# Patient Record
Sex: FEMALE | Race: BLACK OR AFRICAN AMERICAN | NOT HISPANIC OR LATINO | URBAN - METROPOLITAN AREA
[De-identification: names, ages, dates, MRNs, and addresses within clinical notes are randomized per-mention and may not be internally consistent; named-entity substitution may affect disease eponyms.]

---

## 2024-02-01 VITALS
TEMPERATURE: 99 F | HEART RATE: 83 BPM | WEIGHT: 279.99 LBS | DIASTOLIC BLOOD PRESSURE: 87 MMHG | SYSTOLIC BLOOD PRESSURE: 130 MMHG | OXYGEN SATURATION: 100 % | HEIGHT: 67 IN | RESPIRATION RATE: 15 BRPM

## 2024-02-01 LAB
ANION GAP SERPL CALC-SCNC: 13 MMOL/L — SIGNIFICANT CHANGE UP (ref 5–17)
BUN SERPL-MCNC: 12 MG/DL — SIGNIFICANT CHANGE UP (ref 7–23)
CALCIUM SERPL-MCNC: 9.5 MG/DL — SIGNIFICANT CHANGE UP (ref 8.4–10.5)
CHLORIDE SERPL-SCNC: 98 MMOL/L — SIGNIFICANT CHANGE UP (ref 96–108)
CO2 SERPL-SCNC: 22 MMOL/L — SIGNIFICANT CHANGE UP (ref 22–31)
CREAT SERPL-MCNC: 0.73 MG/DL — SIGNIFICANT CHANGE UP (ref 0.5–1.3)
EGFR: 105 ML/MIN/1.73M2 — SIGNIFICANT CHANGE UP
GLUCOSE SERPL-MCNC: 203 MG/DL — HIGH (ref 70–99)
HCG SERPL-ACNC: <0 MIU/ML — SIGNIFICANT CHANGE UP
HCT VFR BLD CALC: 32.8 % — LOW (ref 34.5–45)
HGB BLD-MCNC: 10.2 G/DL — LOW (ref 11.5–15.5)
LIDOCAIN IGE QN: 11 U/L — SIGNIFICANT CHANGE UP (ref 7–60)
MCHC RBC-ENTMCNC: 22 PG — LOW (ref 27–34)
MCHC RBC-ENTMCNC: 31.1 GM/DL — LOW (ref 32–36)
MCV RBC AUTO: 70.8 FL — LOW (ref 80–100)
PLATELET # BLD AUTO: 294 K/UL — SIGNIFICANT CHANGE UP (ref 150–400)
POTASSIUM SERPL-MCNC: 3.1 MMOL/L — LOW (ref 3.5–5.3)
POTASSIUM SERPL-SCNC: 3.1 MMOL/L — LOW (ref 3.5–5.3)
RBC # BLD: 4.63 M/UL — SIGNIFICANT CHANGE UP (ref 3.8–5.2)
RBC # FLD: 14.1 % — SIGNIFICANT CHANGE UP (ref 10.3–14.5)
SODIUM SERPL-SCNC: 133 MMOL/L — LOW (ref 135–145)
TROPONIN T, HIGH SENSITIVITY RESULT: 7 NG/L — SIGNIFICANT CHANGE UP (ref 0–51)
WBC # BLD: 5.31 K/UL — SIGNIFICANT CHANGE UP (ref 3.8–10.5)
WBC # FLD AUTO: 5.31 K/UL — SIGNIFICANT CHANGE UP (ref 3.8–10.5)

## 2024-02-01 PROCEDURE — 99285 EMERGENCY DEPT VISIT HI MDM: CPT

## 2024-02-01 PROCEDURE — 71045 X-RAY EXAM CHEST 1 VIEW: CPT | Mod: 26

## 2024-02-01 RX ORDER — ONDANSETRON 8 MG/1
4 TABLET, FILM COATED ORAL ONCE
Refills: 0 | Status: COMPLETED | OUTPATIENT
Start: 2024-02-01 | End: 2024-02-01

## 2024-02-01 RX ORDER — ACETAMINOPHEN 500 MG
1000 TABLET ORAL ONCE
Refills: 0 | Status: COMPLETED | OUTPATIENT
Start: 2024-02-01 | End: 2024-02-01

## 2024-02-01 RX ORDER — FAMOTIDINE 10 MG/ML
20 INJECTION INTRAVENOUS ONCE
Refills: 0 | Status: COMPLETED | OUTPATIENT
Start: 2024-02-01 | End: 2024-02-01

## 2024-02-01 RX ADMIN — Medication 400 MILLIGRAM(S): at 23:53

## 2024-02-01 RX ADMIN — FAMOTIDINE 20 MILLIGRAM(S): 10 INJECTION INTRAVENOUS at 23:53

## 2024-02-01 RX ADMIN — ONDANSETRON 4 MILLIGRAM(S): 8 TABLET, FILM COATED ORAL at 23:02

## 2024-02-01 NOTE — ED ADULT TRIAGE NOTE - LOCATION:
Report given to nurse on 2N. Patient's wife (Pat) updated on plan and room number. All belongings including cane, Optune, cell phone with , glasses, and clothing items. Patient was discharged to  at 1430. PIV left in per nurses request.    Left arm;

## 2024-02-01 NOTE — ED ADULT NURSE NOTE - OBJECTIVE STATEMENT
pt reports 'I had chest pain a few days ago and it went away. Tonight, I went to lay down and the pain returned but it's much worse than last time." pt reports feeling nauseous, one episode of non bloody vomit noted in ED, STAT EKG performed, medicated patient with zofran as per order, labs obtained and sent, pt placed on cardiac monitoring. Pt reports the pain lessened after vomiting. Denies radiating pain, no sweating noted

## 2024-02-01 NOTE — ED ADULT TRIAGE NOTE - CHIEF COMPLAINT QUOTE
chest pain that started today after walking. hx of htn and lupus. given one tablet of nitroglycerin and 324 aspirin by ems

## 2024-02-02 ENCOUNTER — INPATIENT (INPATIENT)
Facility: HOSPITAL | Age: 44
LOS: 2 days | Discharge: HOME | DRG: 321 | End: 2024-02-05
Attending: INTERNAL MEDICINE | Admitting: STUDENT IN AN ORGANIZED HEALTH CARE EDUCATION/TRAINING PROGRAM
Payer: COMMERCIAL

## 2024-02-02 DIAGNOSIS — Z29.9 ENCOUNTER FOR PROPHYLACTIC MEASURES, UNSPECIFIED: ICD-10-CM

## 2024-02-02 DIAGNOSIS — I21.3 ST ELEVATION (STEMI) MYOCARDIAL INFARCTION OF UNSPECIFIED SITE: ICD-10-CM

## 2024-02-02 DIAGNOSIS — D50.9 IRON DEFICIENCY ANEMIA, UNSPECIFIED: ICD-10-CM

## 2024-02-02 DIAGNOSIS — R00.2 PALPITATIONS: ICD-10-CM

## 2024-02-02 DIAGNOSIS — E78.5 HYPERLIPIDEMIA, UNSPECIFIED: ICD-10-CM

## 2024-02-02 DIAGNOSIS — M32.9 SYSTEMIC LUPUS ERYTHEMATOSUS, UNSPECIFIED: ICD-10-CM

## 2024-02-02 DIAGNOSIS — N92.0 EXCESSIVE AND FREQUENT MENSTRUATION WITH REGULAR CYCLE: ICD-10-CM

## 2024-02-02 DIAGNOSIS — I10 ESSENTIAL (PRIMARY) HYPERTENSION: ICD-10-CM

## 2024-02-02 LAB
A1C WITH ESTIMATED AVERAGE GLUCOSE RESULT: 6.3 % — HIGH (ref 4–5.6)
ALBUMIN SERPL ELPH-MCNC: 4 G/DL — SIGNIFICANT CHANGE UP (ref 3.3–5)
ALBUMIN SERPL ELPH-MCNC: 4.3 G/DL — SIGNIFICANT CHANGE UP (ref 3.3–5)
ALP SERPL-CCNC: 74 U/L — SIGNIFICANT CHANGE UP (ref 40–120)
ALP SERPL-CCNC: 79 U/L — SIGNIFICANT CHANGE UP (ref 40–120)
ALT FLD-CCNC: 28 U/L — SIGNIFICANT CHANGE UP (ref 10–45)
ALT FLD-CCNC: 49 U/L — HIGH (ref 10–45)
ANION GAP SERPL CALC-SCNC: 10 MMOL/L — SIGNIFICANT CHANGE UP (ref 5–17)
ANION GAP SERPL CALC-SCNC: 10 MMOL/L — SIGNIFICANT CHANGE UP (ref 5–17)
ANISOCYTOSIS BLD QL: SLIGHT — SIGNIFICANT CHANGE UP
APTT BLD: 97.6 SEC — HIGH (ref 24.5–35.6)
AST SERPL-CCNC: 107 U/L — HIGH (ref 10–40)
AST SERPL-CCNC: 297 U/L — HIGH (ref 10–40)
BASOPHILS # BLD AUTO: 0 K/UL — SIGNIFICANT CHANGE UP (ref 0–0.2)
BASOPHILS # BLD AUTO: 0.01 K/UL — SIGNIFICANT CHANGE UP (ref 0–0.2)
BASOPHILS NFR BLD AUTO: 0 % — SIGNIFICANT CHANGE UP (ref 0–2)
BASOPHILS NFR BLD AUTO: 0.2 % — SIGNIFICANT CHANGE UP (ref 0–2)
BILIRUB SERPL-MCNC: 0.3 MG/DL — SIGNIFICANT CHANGE UP (ref 0.2–1.2)
BILIRUB SERPL-MCNC: 0.3 MG/DL — SIGNIFICANT CHANGE UP (ref 0.2–1.2)
BLD GP AB SCN SERPL QL: NEGATIVE — SIGNIFICANT CHANGE UP
BUN SERPL-MCNC: 11 MG/DL — SIGNIFICANT CHANGE UP (ref 7–23)
BUN SERPL-MCNC: 8 MG/DL — SIGNIFICANT CHANGE UP (ref 7–23)
CALCIUM SERPL-MCNC: 9.5 MG/DL — SIGNIFICANT CHANGE UP (ref 8.4–10.5)
CALCIUM SERPL-MCNC: 9.6 MG/DL — SIGNIFICANT CHANGE UP (ref 8.4–10.5)
CHLORIDE SERPL-SCNC: 100 MMOL/L — SIGNIFICANT CHANGE UP (ref 96–108)
CHLORIDE SERPL-SCNC: 99 MMOL/L — SIGNIFICANT CHANGE UP (ref 96–108)
CHOLEST SERPL-MCNC: 198 MG/DL — SIGNIFICANT CHANGE UP
CK MB CFR SERPL CALC: 176.4 NG/ML — HIGH (ref 0–6.7)
CK SERPL-CCNC: 1483 U/L — HIGH (ref 25–170)
CO2 SERPL-SCNC: 22 MMOL/L — SIGNIFICANT CHANGE UP (ref 22–31)
CO2 SERPL-SCNC: 26 MMOL/L — SIGNIFICANT CHANGE UP (ref 22–31)
CREAT SERPL-MCNC: 0.69 MG/DL — SIGNIFICANT CHANGE UP (ref 0.5–1.3)
CREAT SERPL-MCNC: 0.91 MG/DL — SIGNIFICANT CHANGE UP (ref 0.5–1.3)
DACRYOCYTES BLD QL SMEAR: SLIGHT — SIGNIFICANT CHANGE UP
EGFR: 111 ML/MIN/1.73M2 — SIGNIFICANT CHANGE UP
EGFR: 81 ML/MIN/1.73M2 — SIGNIFICANT CHANGE UP
EOSINOPHIL # BLD AUTO: 0 K/UL — SIGNIFICANT CHANGE UP (ref 0–0.5)
EOSINOPHIL # BLD AUTO: 0.05 K/UL — SIGNIFICANT CHANGE UP (ref 0–0.5)
EOSINOPHIL NFR BLD AUTO: 0 % — SIGNIFICANT CHANGE UP (ref 0–6)
EOSINOPHIL NFR BLD AUTO: 0.9 % — SIGNIFICANT CHANGE UP (ref 0–6)
ESTIMATED AVERAGE GLUCOSE: 134 MG/DL — HIGH (ref 68–114)
FLUAV AG NPH QL: SIGNIFICANT CHANGE UP
FLUBV AG NPH QL: SIGNIFICANT CHANGE UP
GLUCOSE SERPL-MCNC: 124 MG/DL — HIGH (ref 70–99)
GLUCOSE SERPL-MCNC: 143 MG/DL — HIGH (ref 70–99)
HCT VFR BLD CALC: 32.3 % — LOW (ref 34.5–45)
HDLC SERPL-MCNC: 56 MG/DL — SIGNIFICANT CHANGE UP
HGB BLD-MCNC: 10 G/DL — LOW (ref 11.5–15.5)
HYPOCHROMIA BLD QL: SLIGHT — SIGNIFICANT CHANGE UP
IMM GRANULOCYTES NFR BLD AUTO: 0.2 % — SIGNIFICANT CHANGE UP (ref 0–0.9)
ISTAT ACTK (ACTIVATED CLOTTING TIME KAOLIN): 174 SEC — HIGH (ref 74–137)
ISTAT ACTK (ACTIVATED CLOTTING TIME KAOLIN): 369 SEC — HIGH (ref 74–137)
LACTATE SERPL-SCNC: 1.1 MMOL/L — SIGNIFICANT CHANGE UP (ref 0.5–2)
LIPID PNL WITH DIRECT LDL SERPL: 128 MG/DL — HIGH
LYMPHOCYTES # BLD AUTO: 1.25 K/UL — SIGNIFICANT CHANGE UP (ref 1–3.3)
LYMPHOCYTES # BLD AUTO: 2.26 K/UL — SIGNIFICANT CHANGE UP (ref 1–3.3)
LYMPHOCYTES # BLD AUTO: 29.2 % — SIGNIFICANT CHANGE UP (ref 13–44)
LYMPHOCYTES # BLD AUTO: 42.5 % — SIGNIFICANT CHANGE UP (ref 13–44)
MACROCYTES BLD QL: SLIGHT — SIGNIFICANT CHANGE UP
MAGNESIUM SERPL-MCNC: 1.6 MG/DL — SIGNIFICANT CHANGE UP (ref 1.6–2.6)
MANUAL SMEAR VERIFICATION: SIGNIFICANT CHANGE UP
MCHC RBC-ENTMCNC: 21.6 PG — LOW (ref 27–34)
MCHC RBC-ENTMCNC: 31 GM/DL — LOW (ref 32–36)
MCV RBC AUTO: 69.6 FL — LOW (ref 80–100)
MICROCYTES BLD QL: SLIGHT — SIGNIFICANT CHANGE UP
MONOCYTES # BLD AUTO: 0.56 K/UL — SIGNIFICANT CHANGE UP (ref 0–0.9)
MONOCYTES # BLD AUTO: 0.6 K/UL — SIGNIFICANT CHANGE UP (ref 0–0.9)
MONOCYTES NFR BLD AUTO: 10.6 % — SIGNIFICANT CHANGE UP (ref 2–14)
MONOCYTES NFR BLD AUTO: 14 % — SIGNIFICANT CHANGE UP (ref 2–14)
NEUTROPHILS # BLD AUTO: 2.41 K/UL — SIGNIFICANT CHANGE UP (ref 1.8–7.4)
NEUTROPHILS # BLD AUTO: 2.44 K/UL — SIGNIFICANT CHANGE UP (ref 1.8–7.4)
NEUTROPHILS NFR BLD AUTO: 45.1 % — SIGNIFICANT CHANGE UP (ref 43–77)
NEUTROPHILS NFR BLD AUTO: 56.4 % — SIGNIFICANT CHANGE UP (ref 43–77)
NEUTS BAND # BLD: 0.9 % — SIGNIFICANT CHANGE UP (ref 0–8)
NON HDL CHOLESTEROL: 142 MG/DL — HIGH
NRBC # BLD: 0 /100 WBCS — SIGNIFICANT CHANGE UP (ref 0–0)
OVALOCYTES BLD QL SMEAR: SIGNIFICANT CHANGE UP
PHOSPHATE SERPL-MCNC: 3.4 MG/DL — SIGNIFICANT CHANGE UP (ref 2.5–4.5)
PLAT MORPH BLD: ABNORMAL
PLATELET # BLD AUTO: 285 K/UL — SIGNIFICANT CHANGE UP (ref 150–400)
POIKILOCYTOSIS BLD QL AUTO: SIGNIFICANT CHANGE UP
POLYCHROMASIA BLD QL SMEAR: SLIGHT — SIGNIFICANT CHANGE UP
POTASSIUM SERPL-MCNC: 3.9 MMOL/L — SIGNIFICANT CHANGE UP (ref 3.5–5.3)
POTASSIUM SERPL-MCNC: 3.9 MMOL/L — SIGNIFICANT CHANGE UP (ref 3.5–5.3)
POTASSIUM SERPL-SCNC: 3.9 MMOL/L — SIGNIFICANT CHANGE UP (ref 3.5–5.3)
POTASSIUM SERPL-SCNC: 3.9 MMOL/L — SIGNIFICANT CHANGE UP (ref 3.5–5.3)
PROT SERPL-MCNC: 8.2 G/DL — SIGNIFICANT CHANGE UP (ref 6–8.3)
PROT SERPL-MCNC: 8.3 G/DL — SIGNIFICANT CHANGE UP (ref 6–8.3)
RBC # BLD: 4.64 M/UL — SIGNIFICANT CHANGE UP (ref 3.8–5.2)
RBC # FLD: 14.4 % — SIGNIFICANT CHANGE UP (ref 10.3–14.5)
RBC BLD AUTO: ABNORMAL
RH IG SCN BLD-IMP: POSITIVE — SIGNIFICANT CHANGE UP
RSV RNA NPH QL NAA+NON-PROBE: SIGNIFICANT CHANGE UP
SARS-COV-2 RNA SPEC QL NAA+PROBE: SIGNIFICANT CHANGE UP
SCHISTOCYTES BLD QL AUTO: SLIGHT — SIGNIFICANT CHANGE UP
SMUDGE CELLS # BLD: PRESENT — SIGNIFICANT CHANGE UP
SODIUM SERPL-SCNC: 132 MMOL/L — LOW (ref 135–145)
SODIUM SERPL-SCNC: 135 MMOL/L — SIGNIFICANT CHANGE UP (ref 135–145)
TRIGL SERPL-MCNC: 68 MG/DL — SIGNIFICANT CHANGE UP
TROPONIN T, HIGH SENSITIVITY RESULT: 2247 NG/L — CRITICAL HIGH (ref 0–51)
TROPONIN T, HIGH SENSITIVITY RESULT: 79 NG/L — CRITICAL HIGH (ref 0–51)
WBC # BLD: 4.28 K/UL — SIGNIFICANT CHANGE UP (ref 3.8–10.5)
WBC # FLD AUTO: 4.28 K/UL — SIGNIFICANT CHANGE UP (ref 3.8–10.5)

## 2024-02-02 PROCEDURE — 93308 TTE F-UP OR LMTD: CPT | Mod: 26,59

## 2024-02-02 PROCEDURE — 93458 L HRT ARTERY/VENTRICLE ANGIO: CPT | Mod: 26,59

## 2024-02-02 PROCEDURE — 74177 CT ABD & PELVIS W/CONTRAST: CPT | Mod: 26,MG

## 2024-02-02 PROCEDURE — 71275 CT ANGIOGRAPHY CHEST: CPT | Mod: 26,MG

## 2024-02-02 PROCEDURE — G1004: CPT

## 2024-02-02 PROCEDURE — 93010 ELECTROCARDIOGRAM REPORT: CPT

## 2024-02-02 PROCEDURE — 92978 ENDOLUMINL IVUS OCT C 1ST: CPT | Mod: 26,LD

## 2024-02-02 PROCEDURE — 92928 PRQ TCAT PLMT NTRAC ST 1 LES: CPT | Mod: LD

## 2024-02-02 PROCEDURE — 93306 TTE W/DOPPLER COMPLETE: CPT | Mod: 26

## 2024-02-02 PROCEDURE — 99291 CRITICAL CARE FIRST HOUR: CPT | Mod: GC

## 2024-02-02 PROCEDURE — 71045 X-RAY EXAM CHEST 1 VIEW: CPT | Mod: 26

## 2024-02-02 PROCEDURE — 99152 MOD SED SAME PHYS/QHP 5/>YRS: CPT

## 2024-02-02 RX ORDER — METOPROLOL TARTRATE 50 MG
25 TABLET ORAL EVERY 12 HOURS
Refills: 0 | Status: DISCONTINUED | OUTPATIENT
Start: 2024-02-02 | End: 2024-02-02

## 2024-02-02 RX ORDER — CHLORHEXIDINE GLUCONATE 213 G/1000ML
1 SOLUTION TOPICAL
Refills: 0 | Status: DISCONTINUED | OUTPATIENT
Start: 2024-02-02 | End: 2024-02-05

## 2024-02-02 RX ORDER — ATORVASTATIN CALCIUM 80 MG/1
80 TABLET, FILM COATED ORAL AT BEDTIME
Refills: 0 | Status: DISCONTINUED | OUTPATIENT
Start: 2024-02-02 | End: 2024-02-05

## 2024-02-02 RX ORDER — HYDROXYCHLOROQUINE SULFATE 200 MG
1 TABLET ORAL
Refills: 0 | DISCHARGE

## 2024-02-02 RX ORDER — ASPIRIN/CALCIUM CARB/MAGNESIUM 324 MG
81 TABLET ORAL EVERY 24 HOURS
Refills: 0 | Status: DISCONTINUED | OUTPATIENT
Start: 2024-02-02 | End: 2024-02-05

## 2024-02-02 RX ORDER — SACUBITRIL AND VALSARTAN 24; 26 MG/1; MG/1
1 TABLET, FILM COATED ORAL
Refills: 0 | Status: DISCONTINUED | OUTPATIENT
Start: 2024-02-02 | End: 2024-02-05

## 2024-02-02 RX ORDER — IOHEXOL 300 MG/ML
30 INJECTION, SOLUTION INTRAVENOUS ONCE
Refills: 0 | Status: COMPLETED | OUTPATIENT
Start: 2024-02-02 | End: 2024-02-02

## 2024-02-02 RX ORDER — POTASSIUM CHLORIDE 20 MEQ
40 PACKET (EA) ORAL ONCE
Refills: 0 | Status: COMPLETED | OUTPATIENT
Start: 2024-02-02 | End: 2024-02-02

## 2024-02-02 RX ORDER — HYDROXYCHLOROQUINE SULFATE 200 MG
200 TABLET ORAL EVERY 12 HOURS
Refills: 0 | Status: DISCONTINUED | OUTPATIENT
Start: 2024-02-02 | End: 2024-02-05

## 2024-02-02 RX ORDER — HEPARIN SODIUM 5000 [USP'U]/ML
5000 INJECTION INTRAVENOUS; SUBCUTANEOUS ONCE
Refills: 0 | Status: COMPLETED | OUTPATIENT
Start: 2024-02-02 | End: 2024-02-02

## 2024-02-02 RX ORDER — MORPHINE SULFATE 50 MG/1
4 CAPSULE, EXTENDED RELEASE ORAL ONCE
Refills: 0 | Status: DISCONTINUED | OUTPATIENT
Start: 2024-02-02 | End: 2024-02-02

## 2024-02-02 RX ORDER — TICAGRELOR 90 MG/1
180 TABLET ORAL ONCE
Refills: 0 | Status: COMPLETED | OUTPATIENT
Start: 2024-02-02 | End: 2024-02-02

## 2024-02-02 RX ORDER — MORPHINE SULFATE 50 MG/1
1 CAPSULE, EXTENDED RELEASE ORAL ONCE
Refills: 0 | Status: DISCONTINUED | OUTPATIENT
Start: 2024-02-02 | End: 2024-02-02

## 2024-02-02 RX ORDER — TICAGRELOR 90 MG/1
90 TABLET ORAL EVERY 12 HOURS
Refills: 0 | Status: DISCONTINUED | OUTPATIENT
Start: 2024-02-02 | End: 2024-02-05

## 2024-02-02 RX ORDER — POTASSIUM CHLORIDE 20 MEQ
20 PACKET (EA) ORAL ONCE
Refills: 0 | Status: COMPLETED | OUTPATIENT
Start: 2024-02-02 | End: 2024-02-02

## 2024-02-02 RX ORDER — HEPARIN SODIUM 5000 [USP'U]/ML
6000 INJECTION INTRAVENOUS; SUBCUTANEOUS EVERY 6 HOURS
Refills: 0 | Status: DISCONTINUED | OUTPATIENT
Start: 2024-02-02 | End: 2024-02-02

## 2024-02-02 RX ORDER — ASPIRIN/CALCIUM CARB/MAGNESIUM 324 MG
325 TABLET ORAL ONCE
Refills: 0 | Status: COMPLETED | OUTPATIENT
Start: 2024-02-02 | End: 2024-02-02

## 2024-02-02 RX ORDER — METOPROLOL TARTRATE 50 MG
12.5 TABLET ORAL EVERY 12 HOURS
Refills: 0 | Status: DISCONTINUED | OUTPATIENT
Start: 2024-02-02 | End: 2024-02-04

## 2024-02-02 RX ORDER — MAGNESIUM SULFATE 500 MG/ML
2 VIAL (ML) INJECTION ONCE
Refills: 0 | Status: COMPLETED | OUTPATIENT
Start: 2024-02-02 | End: 2024-02-02

## 2024-02-02 RX ORDER — HEPARIN SODIUM 5000 [USP'U]/ML
INJECTION INTRAVENOUS; SUBCUTANEOUS
Qty: 25000 | Refills: 0 | Status: DISCONTINUED | OUTPATIENT
Start: 2024-02-02 | End: 2024-02-02

## 2024-02-02 RX ORDER — TICAGRELOR 90 MG/1
1 TABLET ORAL
Qty: 60 | Refills: 0
Start: 2024-02-02 | End: 2024-03-02

## 2024-02-02 RX ADMIN — Medication 200 MILLIGRAM(S): at 21:46

## 2024-02-02 RX ADMIN — TICAGRELOR 180 MILLIGRAM(S): 90 TABLET ORAL at 03:37

## 2024-02-02 RX ADMIN — MORPHINE SULFATE 1 MILLIGRAM(S): 50 CAPSULE, EXTENDED RELEASE ORAL at 07:30

## 2024-02-02 RX ADMIN — HEPARIN SODIUM 5000 UNIT(S): 5000 INJECTION INTRAVENOUS; SUBCUTANEOUS at 03:36

## 2024-02-02 RX ADMIN — ATORVASTATIN CALCIUM 80 MILLIGRAM(S): 80 TABLET, FILM COATED ORAL at 21:46

## 2024-02-02 RX ADMIN — Medication 12.5 MILLIGRAM(S): at 18:34

## 2024-02-02 RX ADMIN — HEPARIN SODIUM 1000 UNIT(S)/HR: 5000 INJECTION INTRAVENOUS; SUBCUTANEOUS at 03:34

## 2024-02-02 RX ADMIN — Medication 81 MILLIGRAM(S): at 21:45

## 2024-02-02 RX ADMIN — Medication 40 MILLIEQUIVALENT(S): at 00:14

## 2024-02-02 RX ADMIN — SACUBITRIL AND VALSARTAN 1 TABLET(S): 24; 26 TABLET, FILM COATED ORAL at 19:20

## 2024-02-02 RX ADMIN — IOHEXOL 30 MILLILITER(S): 300 INJECTION, SOLUTION INTRAVENOUS at 00:31

## 2024-02-02 RX ADMIN — MORPHINE SULFATE 4 MILLIGRAM(S): 50 CAPSULE, EXTENDED RELEASE ORAL at 02:13

## 2024-02-02 RX ADMIN — Medication 25 GRAM(S): at 09:33

## 2024-02-02 RX ADMIN — MORPHINE SULFATE 4 MILLIGRAM(S): 50 CAPSULE, EXTENDED RELEASE ORAL at 02:40

## 2024-02-02 RX ADMIN — HEPARIN SODIUM UNIT(S)/HR: 5000 INJECTION INTRAVENOUS; SUBCUTANEOUS at 04:20

## 2024-02-02 RX ADMIN — Medication 20 MILLIEQUIVALENT(S): at 09:34

## 2024-02-02 RX ADMIN — Medication 200 MILLIGRAM(S): at 10:17

## 2024-02-02 RX ADMIN — TICAGRELOR 90 MILLIGRAM(S): 90 TABLET ORAL at 15:29

## 2024-02-02 RX ADMIN — MORPHINE SULFATE 4 MILLIGRAM(S): 50 CAPSULE, EXTENDED RELEASE ORAL at 00:25

## 2024-02-02 RX ADMIN — MORPHINE SULFATE 4 MILLIGRAM(S): 50 CAPSULE, EXTENDED RELEASE ORAL at 00:50

## 2024-02-02 RX ADMIN — MORPHINE SULFATE 1 MILLIGRAM(S): 50 CAPSULE, EXTENDED RELEASE ORAL at 06:37

## 2024-02-02 NOTE — ED ADULT NURSE REASSESSMENT NOTE - NS ED NURSE REASSESS COMMENT FT1
pt noted with elevated trop level of 79 from 7, also with  EKG changes, cardiac fellow at bedside perform bedside ultrasound, pt is alert and oriented x4, breathing at ease on room air, complaining of chest pain 5/10, heparin and brilinta administered as per order, ongoing close monitoring.

## 2024-02-02 NOTE — PATIENT PROFILE ADULT - FALL HARM RISK - HARM RISK INTERVENTIONS
Assistance with ambulation/Assistance OOB with selected safe patient handling equipment/Communicate Risk of Fall with Harm to all staff/Discuss with provider need for PT consult/Monitor gait and stability/Provide patient with walking aids - walker, cane, crutches/Reinforce activity limits and safety measures with patient and family/Review medications for side effects contributing to fall risk/Sit up slowly, dangle for a short time, stand at bedside before walking/Tailored Fall Risk Interventions/Toileting schedule using arm’s reach rule for commode and bathroom/Use of alarms - bed, chair and/or voice tab/Visual Cue: Yellow wristband and red socks/Bed in lowest position, wheels locked, appropriate side rails in place/Call bell, personal items and telephone in reach/Instruct patient to call for assistance before getting out of bed or chair/Non-slip footwear when patient is out of bed/[place] to call system/Physically safe environment - no spills, clutter or unnecessary equipment/Purposeful Proactive Rounding/Room/bathroom lighting operational, light cord in reach

## 2024-02-02 NOTE — DIETITIAN INITIAL EVALUATION ADULT - OTHER INFO
44 y/o F w/PMH SLE (on plaquinel), HTN, anemia, heavy menstrual cycles (saturated 12-14 tampons per 24 hours), uterine fibroid, L ovary cyst, spinal stenosis (daily NSAID use) who presented with c/o mid epigastric pain and chest pressure, associated with nausea  and R arm tingling. In ED patient initial trop 79 and EKG with ST Changes or TWI. Patient had CT chest ruled out PE, and CT Abdomen Pelvis w/ Multi fibroid uterus including a 2.6 cm hypodense partially calcified intramural fibroid. Patient with recurrent CP in the ER STEMI code called as trop elevated from 7 to 79 and with dynamic EKG changes pt brought urgently to cath lab and now s/p cath w/ Dr. Priest 2/2/2024 with LI to culprit lesion pLAD (100%). Patient had ECHO with reduced LVEF 30-35%, Grade I left ventricular diastolic dysfunction.  Patient stepped down to cardiac tele     Chart reviewed. Pt seen with daughter at bedside on 5 LA, on room air. Currently ordered for PO DASH/TLC diet and tolerating. Pt endorses recent changes to diet including increasing lean protein consumption, increasing fruit and vegetable & whole grain consumption. Pt endorses intentional weight loss of ~25lbs x ~1 year [not significant]. No overt muscle wasting/subcutaneous losses noted. Pt endorses good appetite & PO intake at baseline. No chew/swallow difficulty noted. Labs reviewed- Na 132 <L> [monitor fluid/hydration status], Hgb A1c 6.3% [2/2],  <H>, other lipids WNL. Meds reviewed. Diet education provided to pt on heart healthy diet & weight management, handout provided. All questions answered. Pt may benefit from outpatient RDN counseling for further management, information provided. RDN will continue to monitor, reassess, and intervene as appropriate.      Pain: no pain/discomfort noted  Skin: no pressure injuries noted, Stevan score 20  GI: denies n/v/c/d/abd pain

## 2024-02-02 NOTE — ED CLERICAL - CLERICAL COMMENTS
stemi code called @ 03:06 by JOSEPH Aguilar stemi code called @ 03:06 by JOSEPH Aguilar | cath lab code called @ 04:01 by JOSEPH Aguilar

## 2024-02-02 NOTE — DIETITIAN INITIAL EVALUATION ADULT - PERTINENT MEDS FT
Biopsy Method: Dermablade MEDICATIONS  (STANDING):  aspirin enteric coated 81 milliGRAM(s) Oral every 24 hours  atorvastatin 80 milliGRAM(s) Oral at bedtime  chlorhexidine 2% Cloths 1 Application(s) Topical <User Schedule>  hydroxychloroquine 200 milliGRAM(s) Oral every 12 hours  metoprolol tartrate 12.5 milliGRAM(s) Oral every 12 hours  ticagrelor 90 milliGRAM(s) Oral every 12 hours    MEDICATIONS  (PRN):

## 2024-02-02 NOTE — ED PROVIDER NOTE - PROGRESS NOTE DETAILS
Case discussed with cardiology on call  , pt with ecg changes and second trop 79. asa, brillint and heparin recommended , + echo. awaiting for CTA results.

## 2024-02-02 NOTE — H&P ADULT - HISTORY OF PRESENT ILLNESS
Ms Emma Whipple is a 44 y/o F w/ PMH  SLE, HTN, anemia who presented with c/o mid epigastric pain/mid chest pain, generalized fatigue, some SOB. Pt states her symptoms x4-5 days. Patient tried miralax because she felt bloated which she thought helped initially, but indigestion-like pain persisted. She presented tonight because  Pt reports tonight pain started again, at this time radiating to her mid back, with nausea. Pt vomit on her way to ER. Denies fever, chills, cough, diarrhea, bloody stool.         ED Course  Vitals    T - 98.9    HR - 77-90    BP - 127/69 - 146/81    SPO2 - 99 RA  EKG:   Labs:Hg 10.2; MCV 70.8; trop T 79, K 3.1  Interventions: Cath lab  Meds: famotidine 20mg; morphine 4mg IVx2; zofran 4mg IV, KCl 40meq PO; brilinta 180mg, tylenol 1g IV  Imaging:    CT PE:     CT A/P: Ms Emma Whipple is a 44 y/o F w/ PMH  SLE (on plaquinil), HTN, anemia who presented with c/o mid epigastric pain/mid chest pain, generalized fatigue, some SOB. Pt states her symptoms x4-5 days. Patient tried miralax because she felt bloated which she thought helped initially, but indigestion-like pain persisted. She presented tonight because her pain began radiating to her mid-back and she had new symptom of nausea. Pt had one episode of emesis on her way to the ED. Denies fever, chills, cough, diarrhea, bloody stool.         ED Course  Vitals    T - 98.9    HR - 77-90    BP - 127/69 - 146/81    SPO2 - 99 RA  EKG:   Labs:Hg 10.2; MCV 70.8; trop T 79, K 3.1  Meds: famotidine 20mg; morphine 4mg IVx2; zofran 4mg IV, KCl 40meq PO; brilinta 180mg, tylenol 1g IV  Imaging:    CT PE & CT A/P: 1. No pulmonary embolism 2. Diffuse bladder wall thickening which can be a normal finding when the   bladder is decompressed but is also seen in cystitis. Clinical   correlation is recommended.  Interventions: Cath lab   Ms Emma Whipple is a 44 y/o F w/ PMH  SLE (on plaquinil), HTN, anemia who presented with c/o mid epigastric pain/mid chest pain, generalized fatigue, some SOB. Pt states her symptoms x3-4days. Patient initially attributed her symptoms to gas, took miralax, and herbal teas, which she thought helped initially; during this period she was able to ride her exercise  for  but indigestion-like pain persisted. She presented tonight because her pain began radiating to her mid-back and she had new symptom of nausea. Pt had one episode of emesis on her way to the ED. Denies fever, chills, cough, diarrhea, bloody stool.         ED Course  Vitals    T - 98.9    HR - 77-90    BP - 127/69 - 146/81    SPO2 - 99 RA  EKG:   Labs:Hg 10.2; MCV 70.8; trop T 79, K 3.1  Meds: famotidine 20mg; morphine 4mg IVx2; zofran 4mg IV, KCl 40meq PO; brilinta 180mg, tylenol 1g IV  Imaging:    CT PE & CT A/P: 1. No pulmonary embolism 2. Diffuse bladder wall thickening which can be a normal finding when the   bladder is decompressed but is also seen in cystitis. Clinical   correlation is recommended.  Interventions: Cath lab   Ms Emma Whipple is a 42 y/o F w/ PMH  SLE (on plaquinel), HTN, anemia who presented with c/o mid epigastric pain/mid chest pain, generalized fatigue, some SOB. Pt states her symptoms x3-4days. Patient initially attributed her symptoms to gas, took miralax, and herbal teas, which she thought helped initially; during this period she was able to ride her exercise for but indigestion-like pain persisted. She presented tonight because her chest pressure came back and started radiating to her mid-back, and became nauseous  as she was walking up the stairs. Pt had one episode of emesis on her way to the ED. Denies fever, chills, cough, diarrhea, bloody stool.       ED Course  Vitals    T - 98.9    HR - 77-90    BP - 127/69 - 146/81    SPO2 - 99 RA  EKG:   Labs:Hg 10.2; MCV 70.8; trop T 79, K 3.1  Meds: famotidine 20mg; morphine 4mg IVx2; zofran 4mg IV, KCl 40meq PO; brilinta 180mg, tylenol 1g IV  Imaging:    CT PE & CT A/P: 1. No pulmonary embolism 2. Diffuse bladder wall thickening which can be a normal finding when the   bladder is decompressed but is also seen in cystitis. Clinical   correlation is recommended.  Interventions: Cath lab   Ms Emma Whipple is a 44 y/o F w/ PMH  SLE (on plaquinel), HTN, anemia who presented with c/o mid epigastric pain and chest pressure, associated with nausea  and R arm tingling. Pt states her symptoms x3-4days. Patient initially attributed her symptoms to gas, took miralax, and herbal teas, which she thought helped initially; during this period she was able to ride her exercise bike and do yoga, but yesterday her chest tightness came back while she was walking up the stairs to the park with her daughter; this pain persisted while she was trying to go to bed and was worse than previous days, which prompted her to present. tonight. Pt had one episode of emesis on her way to the ED. Denies fever, chills, cough, diarrhea, bloody stool. Patient started her menstrual cycle 02/01.     Of note, pt endorses several-year hx of palpitations, which come on randomly, and are not associated with SOB, lightheadedness, chest pain. She was referred to a cardiologist several months ago, who put on a holter monitor for 72 hours; pt notes that the cardiologist said there were some "hiccups" on the report, but denies atrial fibrillation. No interventions were made.     ED Course  Vitals    T - 98.9    HR - 77-90    BP - 127/69 - 146/81    SPO2 - 99 RA  EKG:   Labs:Hg 10.2; MCV 70.8; trop T 79, K 3.1  Meds: famotidine 20mg; morphine 4mg IVx2; zofran 4mg IV, KCl 40meq PO; brilinta 180mg, tylenol 1g IV  Imaging:    CT PE & CT A/P: 1. No pulmonary embolism 2. Diffuse bladder wall thickening which can be a normal finding when the   bladder is decompressed but is also seen in cystitis. Clinical   correlation is recommended.  Interventions: Cath lab   Ms Emma Whipple is a 42 y/o F w/PMH  SLE (on plaquinel), HTN, anemia, heavy menstrual cycles (saturated 12-14 tampons per 24 hours), uterine fibroid, L ovary cyst, spinal stenosis (daily NSAID use) who presented with c/o mid epigastric pain and chest pressure, associated with nausea  and R arm tingling. Pt states her symptoms x3-4days. Patient initially attributed her symptoms to gas, took miralax, and herbal teas, which she thought helped initially; during this period she was able to ride her exercise bike and do yoga, but yesterday her chest tightness came back while she was walking up the stairs to the park with her daughter; this pain persisted while she was trying to go to bed and was worse than previous days, which prompted her to present. tonight. Pt had one episode of emesis on her way to the ED. Denies fever, chills, cough, diarrhea, bloody stool. Patient started her menstrual cycle 02/01.     Of note, pt endorses several-year hx of palpitations, which come on randomly, and are not associated with SOB, lightheadedness, chest pain. She was referred to a cardiologist several months ago, who put on a holter monitor for 72 hours; pt notes that the cardiologist said there were some "hiccups" on the report, but denies atrial fibrillation. No interventions were made.     ED Course  Vitals    T - 98.9    HR - 77-90    BP - 127/69 - 146/81    SPO2 - 99 RA  EKG:   Labs:Hg 10.2; MCV 70.8; trop T 79, K 3.1  Meds: famotidine 20mg; morphine 4mg IVx2; zofran 4mg IV, KCl 40meq PO; brilinta 180mg, tylenol 1g IV  Imaging:    CT PE & CT A/P: 1. No pulmonary embolism 2. Diffuse bladder wall thickening which can be a normal finding when the   bladder is decompressed but is also seen in cystitis. Clinical   correlation is recommended.  Interventions: Cath lab   Ms Emma Whipple is a 44 y/o F w/PMH  SLE (on plaquinel), HTN, anemia, heavy menstrual cycles (saturated 12-14 tampons per 24 hours), uterine fibroid, L ovary cyst, spinal stenosis (daily NSAID use) who presented with c/o mid epigastric pain and chest pressure, associated with nausea  and R arm tingling. Pt states her symptoms x3-4days. Patient initially attributed her symptoms to gas, took miralax, and herbal teas, which she thought helped initially; during this period she was able to ride her exercise bike and do yoga, but yesterday her chest tightness came back while she was walking up the stairs to the park with her daughter; this pain persisted while she was trying to go to bed and was worse than previous days, which prompted her to present. tonight. Pt had one episode of emesis on her way to the ED. Denies fever, chills, cough, diarrhea, bloody stool. Patient started her menstrual cycle 02/01.     Of note, pt endorses several-year hx of palpitations, which come on randomly, and are not associated with SOB, lightheadedness, chest pain. She was referred to a cardiologist several months ago, who put on a holter monitor for 72 hours; pt notes that the cardiologist said there were some "hiccups" on the report, but denies atrial fibrillation. No interventions were made.     ED Course  Vitals    T - 98.9    HR - 77-90    BP - 127/69 - 146/81    SPO2 - 99 RA  EKG: ST-elevation in AVR  Labs:Hg 10.2; MCV 70.8; trop T 79, K 3.1  Meds: famotidine 20mg; morphine 4mg IVx2; zofran 4mg IV, KCl 40meq PO; brilinta 180mg, tylenol 1g IV  Imaging:    CT PE & CT A/P: 1. No pulmonary embolism 2. Diffuse bladder wall thickening which can be a normal finding when the   bladder is decompressed but is also seen in cystitis. Clinical   correlation is recommended.  Interventions: Cath lab

## 2024-02-02 NOTE — DIETITIAN INITIAL EVALUATION ADULT - OTHER CALCULATIONS
Ideal body weight (61.2kg) used for calculations as pt >120% of IBW. Needs estimated for age and adjusted for current clinical status.

## 2024-02-02 NOTE — PROGRESS NOTE ADULT - PROBLEM SELECTOR PLAN 4
Patient w/ SLE, on plaquenil 200mg BID   -continue plaquenil 200mg BID Total cholesterol 198; HDL 56, . Goal LDL given CAD is LDL of 70.  - c/w atorvastatin 80 mg QHS

## 2024-02-02 NOTE — DIETITIAN INITIAL EVALUATION ADULT - PERTINENT LABORATORY DATA
02-02    132<L>  |  100  |  8   ----------------------------<  143<H>  3.9   |  22  |  0.69    Ca    9.6      02 Feb 2024 06:31  Phos  3.4     02-02  Mg     1.6     02-02    TPro  8.2  /  Alb  4.0  /  TBili  0.3  /  DBili  x   /  AST  107<H>  /  ALT  28  /  AlkPhos  74  02-02  A1C with Estimated Average Glucose Result: 6.3 % (02-02-24 @ 06:31)

## 2024-02-02 NOTE — H&P ADULT - ASSESSMENT
Ms Emma Whipple is a 42 y/o F w/ PMH  SLE (on plaquinel), HTN, anemia who presented with c/o mid epigastric pain/mid chest pain, generalized fatigue, and SOB for 3 days, was found to have STEMI and 100% occlusion of the proximal LAD, s/p cath lab and stent to prox LAD. Admitted to CCU for monitoring.    NEURO  no active issues    CARDS  #STEMI  s/p PCI w/ stent to proximal LAD via L radial access. s/p aspirin and brillinta load.  -brilinta maintenance dose  -aspirin maintenance dose  -atorvastatin 80mg q24  -monitor and deflate trans-radial band as appropriate  -f/u limited TTE  -f/u complete TTE  -f/u daily EKGs  -arrange for close cardiology f/u  -f/u lipid panel  -f/u A1C    #HTN  On home amlodipine ____. Normotensive on admission and post-cath.  -hold amlodipine for now and monitor pressures  -restart amlodipine vs. start ACE/ARB as appropriate       RESP  no active issues    GI  no active issues    ENDO  f/u A1C    RENAL  no active issues    RHEUM  Patient w/ SLE, on plaquenil ____.   -continue plaquenil     Prophylactic Measures:  D: DASH  E: K > 4; Mg > 2  F: none  DVT Prophylaxis: SCDs for now  Ms Emma Whipple is a 44 y/o F w/ PMH  SLE (on plaquinel), HTN, anemia, heavy menstrual cycles (saturated 12-14 tampons per 24 hours), uterine fibroid, L ovary cyst who presented with c/o mid epigastric pain/mid chest pain, generalized fatigue, and SOB for 3 days, was found to have STEMI and 100% occlusion of the proximal LAD, s/p cath lab and stent to prox LAD. Admitted to CCU for monitoring.    NEURO  no active issues    CARDS  #STEMI  s/p PCI w/ stent to proximal LAD via L radial access. s/p aspirin and brillinta load.  -brilinta maintenance dose  -aspirin maintenance dose  -atorvastatin 80mg q24  -monitor and deflate trans-radial band as appropriate  -f/u limited TTE  -f/u complete TTE  -f/u daily EKGs  -arrange for close cardiology f/u  -f/u lipid panel  -f/u A1C    #HTN  On home amlodipine 10mg q24. Normotensive on admission and post-cath.  -hold amlodipine for now and monitor pressures  -restart amlodipine vs. start ACE/ARB as appropriate     #Palpitations  Pt endorses several-year hx of palpitations which occur randomly. Reports that she saw a cardiologist for evaluation, holter monitor read some "hiccups" but was never told she had an arrythmia. Was told it could be attributed to her anemia or SLE.   -daily EKGs  -ensure proper cardiology f/u     RESP  no active issues    GI  no active issues      #Menorrhagia   Pt reports heavy menstrual cycles, saturating 12-14 tampons in 24 hours. Says she has been evaluated by hematology in the past. She has a uterine fibroid and L ovary cyst.  -maintain active type and screen  -transfuse for Hg < 8     ENDO  f/u A1C    RENAL  no active issues    RHEUM  Patient w/ SLE, on plaquenil 200mg BID   -continue plaquenil 200mg BID    Prophylactic Measures:  D: DASH  E: K > 4; Mg > 2  F: none  DVT Prophylaxis: SCDs for now  Ms Emma Whipple is a 42 y/o F w/ PMH  SLE (on plaquinel), HTN, anemia, heavy menstrual cycles (saturated 12-14 tampons per 24 hours), uterine fibroid, L ovary cyst, spinal stenosis (daily NSAID use) who presented with c/o mid epigastric pain/mid chest pain, generalized fatigue, and SOB for 3 days, was found to have STEMI and 100% occlusion of the proximal LAD, s/p cath lab and stent to prox LAD. Admitted to CCU for monitoring.    NEURO  no active issues    CARDS  #STEMI  s/p PCI w/ stent to proximal LAD via L radial access. s/p aspirin and brillinta load.  -brilinta maintenance dose  -aspirin maintenance dose  -atorvastatin 80mg q24  -monitor and deflate trans-radial band as appropriate  -f/u limited TTE  -f/u complete TTE  -f/u daily EKGs  -arrange for close cardiology f/u  -f/u lipid panel  -f/u A1C    #HTN  On home amlodipine 10mg q24. Normotensive on admission and post-cath.  -hold amlodipine for now and monitor pressures  -restart amlodipine vs. start ACE/ARB as appropriate     #Palpitations  Pt endorses several-year hx of palpitations which occur randomly. Reports that she saw a cardiologist for evaluation, holter monitor read some "hiccups" but was never told she had an arrythmia. Was told it could be attributed to her anemia or SLE.   -daily EKGs  -ensure proper cardiology f/u     RESP  no active issues    GI  no active issues      #Menorrhagia   Pt reports heavy menstrual cycles, saturating 12-14 tampons in 24 hours. Says she has been evaluated by hematology in the past. She has a uterine fibroid and L ovary cyst.  -maintain active type and screen  -transfuse for Hg < 8     ENDO  f/u A1C    RENAL  no active issues    RHEUM  Patient w/ SLE, on plaquenil 200mg BID   -continue plaquenil 200mg BID    HEME  #Iron-deficiency anemia  Likely 2/2 blood loss iso heavy menstrual cycles and uterine fibroid  -maintain active type and screen  -transfused for Hg < 8    ID  no active issues    Prophylactic Measures:  D: DASH  E: K > 4; Mg > 2  F: none  DVT Prophylaxis: SCDs for now

## 2024-02-02 NOTE — PROGRESS NOTE ADULT - SUBJECTIVE AND OBJECTIVE BOX
OVERNIGHT EVENTS:  No acute events overnight.    SUBJECTIVE / INTERVAL HPI: Patient seen and examined at bedside.    Denies CP, SOB, dizziness/lightheadedness, palpitations    12 point ROS otherwise negative    VITAL SIGNS:  Vital Signs Last 24 Hrs  T(C): 36.6 (02 Feb 2024 08:34), Max: 37.2 (01 Feb 2024 22:41)  T(F): 97.8 (02 Feb 2024 08:34), Max: 98.9 (01 Feb 2024 22:41)  HR: 75 (02 Feb 2024 10:05) (74 - 90)  BP: 124/59 (02 Feb 2024 10:05) (116/63 - 146/81)  BP(mean): 85 (02 Feb 2024 10:05) (82 - 92)  RR: 31 (02 Feb 2024 10:05) (15 - 31)  SpO2: 100% (02 Feb 2024 10:05) (99% - 100%)    Parameters below as of 02 Feb 2024 10:05  Patient On (Oxygen Delivery Method): room air          I&O's Summary    01 Feb 2024 07:01  -  02 Feb 2024 07:00  --------------------------------------------------------  IN: 0 mL / OUT: 600 mL / NET: -600 mL      Height (cm): 170.2 (02-01 @ 22:41)  Weight (kg): 127 (02-01 @ 22:41)  BMI (kg/m2): 43.8 (02-01 @ 22:41)  BSA (m2): 2.33 (02-01 @ 22:41)    PHYSICAL EXAM:    General: sitting up in bed, NAD  HEENT: conjunctiva clear; MMM  Neck: supple, no JVD  Cardiovascular: RRR, no murmurs  Respiratory: CTA B/L  Gastrointestinal: soft, NT/ND, +BS  Extremities: WWP, no edema or cyanosis  Vascular: Peripheral pulses palpable 2+ bilaterally/ carotid 2+ b/l, no bruits; radial 2+ b/l; femoral 2+ b/l no bruits; DP/PT 2+ b/l  Neurological: AAOx3, no focal deficits    MEDICATIONS:  MEDICATIONS  (STANDING):  aspirin enteric coated 81 milliGRAM(s) Oral every 24 hours  atorvastatin 80 milliGRAM(s) Oral at bedtime  chlorhexidine 2% Cloths 1 Application(s) Topical <User Schedule>  hydroxychloroquine 200 milliGRAM(s) Oral every 12 hours  ticagrelor 90 milliGRAM(s) Oral every 12 hours    MEDICATIONS  (PRN):      LABS:                        10.0   4.28  )-----------( 285      ( 02 Feb 2024 06:31 )             32.3       02-02    132<L>  |  100  |  8   ----------------------------<  143<H>  3.9   |  22  |  0.69    Ca    9.6      02 Feb 2024 06:31  Phos  3.4     02-02  Mg     1.6     02-02    TPro  8.2  /  Alb  4.0  /  TBili  0.3  /  DBili  x   /  AST  107<H>  /  ALT  28  /  AlkPhos  74  02-02      PT/INR - ( 01 Feb 2024 23:03 )   PT: 11.6 sec;   INR: 1.02          PTT - ( 02 Feb 2024 06:31 )  PTT:97.6 sec    CARDIAC MARKERS ( 02 Feb 2024 06:31 )  x     / x     / 1483 U/L / x     / 176.4 ng/mL        TELEMETRY:    RADIOLOGY & ADDITIONAL TESTS: Reviewed.

## 2024-02-02 NOTE — PROGRESS NOTE ADULT - PROBLEM SELECTOR PLAN 1
s/p PCI w/ stent to proximal LAD via L radial access. s/p aspirin and brillinta load.  - metoprolol tartrate 25mg BID  -brilinta maintenance dose  -aspirin maintenance dose  -atorvastatin 80mg q24  -f/u complete TTE  -f/u daily EKGs  -arrange for close cardiology f/u  -f/u lipid panel  -f/u A1C s/p PCI w/ stent to proximal LAD via L radial access. s/p aspirin and brillinta load.   - metoprolol tartrate 12.5 mg BID  -brilinta 90 mg BID  -aspirin 81 mg QD  -atorvastatin 80mg q24  -f/u complete TTE  -f/u daily EKGs  -arrange for close cardiology f/u

## 2024-02-02 NOTE — PROGRESS NOTE ADULT - PROBLEM SELECTOR PLAN 1
-Patient originally c/o mid epigastric pain and chest pressure, associated with nausea  and R arm tingling 3x4 days   - trops original 7 in ER, still with CP throughout the night repeat EKGS with dynamic changes notably in depression avR trop repeat sent from 7 to 79, STEMI code called   - patient now s/p cath with Dr. Priest 2/2/2024 with DESx1 to pLAD (100%), Right Radial Access, M  LM The segment is visually normal in size and structure. Diag: Mild diffuse disease.  LCX Mild. OM1 Mild RCA Mild RPDA: Mild  2/2/24 LVEF reduced 30-35%, global hypokinesis, Grade 1 DD   GMDT: Lopressor 12.5mg BID

## 2024-02-02 NOTE — ED PROVIDER NOTE - CLINICAL SUMMARY MEDICAL DECISION MAKING FREE TEXT BOX
44 yo female with h/o SLE, HTN, anemia in the ER c/o mid epigastric pain/mid chest pain, generalized fatigue, some SOB. Pt states her symptoms started 4-5 days ago. Pt felt bloated and took miralax that  was helping initially, but indigestion-like pain persisted. Pt reports tonight pain started again, at this time radiating to her mid back, with nausea. Pt vomit on her way to ER. Denies fever, chills, cough, diarrhea, bloody stool. Mentioned started her menses today. Last BM- yesterday. pt denies prior cardiac h/o or h/o dvt/pe.   Pt afebrile, non-toxic appearing, VSS, not hypoxic and not tachypneic. On exam- tender to mid epigastric area and c/o pain to mid chest area. ecg done- no  acute ischemic changes. will check labs, chest CTA r/o pe and CT a/p.  etiology of pt's symptoms unclear.

## 2024-02-02 NOTE — PROGRESS NOTE ADULT - PROBLEM SELECTOR PLAN 6
Likely 2/2 blood loss iso heavy menstrual cycles and uterine fibroid  -maintain active type and screen  -transfused for Hg < 8 Pt reports heavy menstrual cycles, saturating 12-14 tampons in 24 hours. Says she has been evaluated by hematology in the past. She has a uterine fibroid and L ovary cyst.  -maintain active type and screen  -transfuse for Hg < 8

## 2024-02-02 NOTE — PROGRESS NOTE ADULT - SUBJECTIVE AND OBJECTIVE BOX
EMMA JACKSON  43y  Female      Patient is a 43y old  Female who presents with a chief complaint of     ***************TRANSFER NOTE FROM CCU TO CARDIAC TELE************  HOSPITAL COURSE:  Ms Emma Jackson is a 44 y/o F w/ PMH  SLE (on plaquinel), HTN, anemia, heavy menstrual cycles (saturated 12-14 tampons per 24 hours), uterine fibroid, L ovary cyst, spinal stenosis (daily NSAID use) who presented with c/o mid epigastric pain/mid chest pain, generalized fatigue, and SOB for 3 days. Found to have STEMI s/p PCI with stent placed in pLAD due to 100% occlusion. Access site of R radial artery, s/p removal of TR band. Started on ASA, Brilinta, statin. No immediate complications noted post procedure. VS stable overnight. Started metoprolol tartrate 25mg BID. Stable for stepdown to cardiac tele with plan for starting appropriate GDMT pending formal TTE.    INTERVAL HPI/OVERNIGHT EVENTS:    REVIEW OF SYSTEMS:  CONSTITUTIONAL: No fever, weight loss, or fatigue  EYES: No eye pain, visual disturbances, or discharge  ENMT:  No difficulty hearing, tinnitus, vertigo; No sinus or throat pain  NECK: No pain or stiffness  RESPIRATORY: No cough, wheezing, chills or hemoptysis; No shortness of breath  CARDIOVASCULAR: No chest pain, palpitations, dizziness, or leg swelling  GASTROINTESTINAL: No abdominal or epigastric pain. No diarrhea or constipation. No nausea, vomiting, or hematemesis. No melena or hematochezia.  GENITOURINARY: No dysuria, frequency, hematuria, or incontinence  NEUROLOGICAL: No headaches, memory loss, loss of strength, numbness, or tremors  MUSCULOSKELETAL: No joint pain or swelling; No muscle, back, or extremity pain  SKIN: No itching, burning, rashes, or lesions   LYMPH NODES: No enlarged glands  ENDOCRINE: No heat or cold intolerance; No hair loss  PSYCHIATRIC: No depression, anxiety, mood swings, or difficulty sleeping  HEME/LYMPH: No easy bruising, or bleeding gums  ALLERY AND IMMUNOLOGIC: No hives or eczema    Vital Signs Last 24 Hrs  T(C): 36.6 (02 Feb 2024 08:34), Max: 37.2 (01 Feb 2024 22:41)  T(F): 97.8 (02 Feb 2024 08:34), Max: 98.9 (01 Feb 2024 22:41)  HR: 86 (02 Feb 2024 12:55) (70 - 90)  BP: 131/76 (02 Feb 2024 12:55) (113/72 - 146/81)  BP(mean): 96 (02 Feb 2024 12:55) (82 - 96)  RR: 16 (02 Feb 2024 12:55) (15 - 31)  SpO2: 98% (02 Feb 2024 12:55) (98% - 100%)    Parameters below as of 02 Feb 2024 12:00  Patient On (Oxygen Delivery Method): room air        PHYSICAL EXAM:  Constitutional: NAD, pleasant and conversant   HEENT: NC/AT, EOMI, MMM  Neck: Supple, no JVD appreciated   Respiratory: CTA B/L;   Cardiovascular: RRR, normal S1 and S2, no m/r/g.   Gastrointestinal: soft, nontender/nondistended, no guarding or rebound tenderness, no palpable masses   Extremities: wwp; no cyanosis, clubbing or edema.   Vascular: palpable peripheral pulses   Neurological: AAOx3, no CN deficits, strength and sensation intact throughout.   Skin: No gross skin abnormalities or rashes      Consultant(s) Notes Reviewed:  [x ] YES  [ ] NO  Care Discussed with Consultants/Other Providers [ x] YES  [ ] NO    LABS:                        10.0   4.28  )-----------( 285      ( 02 Feb 2024 06:31 )             32.3     02-02    132<L>  |  100  |  8   ----------------------------<  143<H>  3.9   |  22  |  0.69    Ca    9.6      02 Feb 2024 06:31  Phos  3.4     02-02  Mg     1.6     02-02    TPro  8.2  /  Alb  4.0  /  TBili  0.3  /  DBili  x   /  AST  107<H>  /  ALT  28  /  AlkPhos  74  02-02    PT/INR - ( 01 Feb 2024 23:03 )   PT: 11.6 sec;   INR: 1.02          PTT - ( 02 Feb 2024 06:31 )  PTT:97.6 sec  Urinalysis Basic - ( 02 Feb 2024 06:31 )    Color: x / Appearance: x / SG: x / pH: x  Gluc: 143 mg/dL / Ketone: x  / Bili: x / Urobili: x   Blood: x / Protein: x / Nitrite: x   Leuk Esterase: x / RBC: x / WBC x   Sq Epi: x / Non Sq Epi: x / Bacteria: x        CAPILLARY BLOOD GLUCOSE          RADIOLOGY & ADDITIONAL TESTS:    Imaging Personally Reviewed:  [ ] YES  [ ] NO EMMA JACKSON  43y  Female      Patient is a 43y old  Female who presents with a chief complaint of     ***************TRANSFER NOTE FROM CCU TO CARDIAC TELE************  HOSPITAL COURSE:  Ms Emma Jackson is a 44 y/o F w/ PMH  SLE (on plaquinel), HTN, anemia, heavy menstrual cycles (saturated 12-14 tampons per 24 hours), uterine fibroid, L ovary cyst, spinal stenosis (daily NSAID use) who presented with c/o mid epigastric pain/mid chest pain, generalized fatigue, and SOB for 3 days. Found to have STEMI s/p PCI with stent placed in pLAD due to 100% occlusion. Access site of R radial artery, s/p removal of TR band. Started on ASA, Brilinta, statin. No immediate complications noted post procedure. VS stable overnight. Started metoprolol tartrate 25mg BID. Stable for stepdown to cardiac tele with plan for starting appropriate GDMT pending formal TTE.    INTERVAL HPI/OVERNIGHT EVENTS:  Patient seen and examined at bedside. Reports some SOB with activity, reports she can walk up and down the [place] but not half or a full city block recently. Denies N/V/D/C or abdominal pain. Reports feeling sweaty as her room temperature is warm. She is menstruating and reporting a heavy flow.       Vital Signs Last 24 Hrs  T(C): 36.6 (02 Feb 2024 08:34), Max: 37.2 (01 Feb 2024 22:41)  T(F): 97.8 (02 Feb 2024 08:34), Max: 98.9 (01 Feb 2024 22:41)  HR: 86 (02 Feb 2024 12:55) (70 - 90)  BP: 131/76 (02 Feb 2024 12:55) (113/72 - 146/81)  BP(mean): 96 (02 Feb 2024 12:55) (82 - 96)  RR: 16 (02 Feb 2024 12:55) (15 - 31)  SpO2: 98% (02 Feb 2024 12:55) (98% - 100%)    Parameters below as of 02 Feb 2024 12:00  Patient On (Oxygen Delivery Method): room air    PHYSICAL EXAM:  Constitutional: NAD, pleasant and conversant   HEENT: NC/AT, EOMI, MMM  Neck: Supple, no JVD appreciated   Respiratory: CTA B/L;   Cardiovascular: RRR, normal S1 and S2, no m/r/g.   Gastrointestinal: soft, nontender/nondistended, no guarding or rebound tenderness  Extremities: wwp; no cyanosis, clubbing or edema.   Vascular: palpable peripheral pulses   Neurological: AAOx3      Consultant(s) Notes Reviewed:  [x ] YES  [ ] NO  Care Discussed with Consultants/Other Providers [ x] YES  [ ] NO    LABS:                        10.0   4.28  )-----------( 285      ( 02 Feb 2024 06:31 )             32.3     02-02    132<L>  |  100  |  8   ----------------------------<  143<H>  3.9   |  22  |  0.69    Ca    9.6      02 Feb 2024 06:31  Phos  3.4     02-02  Mg     1.6     02-02    TPro  8.2  /  Alb  4.0  /  TBili  0.3  /  DBili  x   /  AST  107<H>  /  ALT  28  /  AlkPhos  74  02-02    PT/INR - ( 01 Feb 2024 23:03 )   PT: 11.6 sec;   INR: 1.02          PTT - ( 02 Feb 2024 06:31 )  PTT:97.6 sec  Urinalysis Basic - ( 02 Feb 2024 06:31 )    Color: x / Appearance: x / SG: x / pH: x  Gluc: 143 mg/dL / Ketone: x  / Bili: x / Urobili: x   Blood: x / Protein: x / Nitrite: x   Leuk Esterase: x / RBC: x / WBC x   Sq Epi: x / Non Sq Epi: x / Bacteria: x        CAPILLARY BLOOD GLUCOSE          RADIOLOGY & ADDITIONAL TESTS:    Imaging Personally Reviewed:  [ ] YES  [ ] NO

## 2024-02-02 NOTE — ED PROVIDER NOTE - ATTENDING APP SHARED VISIT CONTRIBUTION OF CARE
42 yo female with h/o SLE, HTN, anemia in the ER c/o mid epigastric pain/mid chest pain, generalized fatigue, some SOB. Pt states her symptoms started 4-5 days ago. Pt felt bloated and took miralax that  was helping initially, but indigestion-like pain persisted. Pt reports tonight pain started again, at this time radiating to her mid back, with nausea. Pt vomit on her way to ER. Denies fever, chills, cough, diarrhea, bloody stool. Mentioned started her menses today. Last BM- yesterday. pt denies prior cardiac h/o or h/o dvt/pe.   Pt afebrile, non-toxic appearing, VSS, not hypoxic and not tachypneic. On exam- tender to mid epigastric area and c/o pain to mid chest area. ecg done- no  acute ischemic changes. will check labs, chest CTA r/o pe and CT a/p.  etiology of pt's symptoms unclear.

## 2024-02-02 NOTE — PROGRESS NOTE ADULT - PROBLEM SELECTOR PLAN 7
F: none  E: K > 4; Mg > 2  D: DASH  DVT Prophylaxis: SCDs for now   GI ppx: none   Dispo: 5 lach Likely 2/2 blood loss iso heavy menstrual cycles and uterine fibroid  -maintain active type and screen  -transfused for Hg < 8

## 2024-02-02 NOTE — PROGRESS NOTE ADULT - ASSESSMENT
44 y/o F w/PMH SLE (on plaquinel), HTN, anemia, heavy menstrual cycles (saturated 12-14 tampons per 24 hours), uterine fibroid, L ovary cyst, spinal stenosis (daily NSAID use) who presented with c/o mid epigastric pain and chest pressure, associated with nausea  and R arm tingling. In ED patient initial trop 79 and EKG with ST Changes or TWI. Patient had CT chest ruled out PE, and CT Abdomen Pelvis w/ Multi fibroid uterus including a 2.6 cm hypodense partially calcified intramural fibroid. Patient with recurrent CP in the ER STEMI code called as trop elevated from 7 to 79 and with dynamic EKG changes pt brought urgently to cath lab and now s/p cath w/ Dr. Priest 2/2/2024 with LI to culprit lesion pLAD (100%). Patient had ECHO with reduced LVEF 30-35%, Grade I left ventricular diastolic dysfunction.  Patient stepped down to cardiac tele

## 2024-02-02 NOTE — ED PROVIDER NOTE - TEMPLATE, MLM
April 19, 2021       Omer Castellanos MD  [id number] [facility] [id number]  Via In Basket      Patient: Luis Alfredo Perry   YOB: 1943   Date of Visit: 4/19/2021       Dear Dr. Castellanos:    Thank you for referring Luis Alfredo Perry to me for evaluation. Below are my notes for this visit with him.    If you have questions, please do not hesitate to call me. I look forward to following your patient along with you.      Sincerely,        Crystal Landeros MD        CC: No Recipients  Crystal Landeros MD  4/19/2021  4:23 PM  Signed  COLORECTAL SURGERY  CHIEF COMPLAINT:  Consultation (fissure in the sphincter)      VITALS:   BP (!) 150/85 (BP Location: LUE - Left upper extremity, Patient Position: Standing, Cuff Size: Large Adult)   Pulse 90   Ht 6' 5\" (1.956 m)   Wt 123.2 kg   BMI 32.21 kg/m²   BSA 2.55 m²      HISTORY OF PRESENT ILLNESS:   Luis Alfredo Perry is a 77 year old  male whom I was asked to see for evaluation of anal fissure.  Consult requested by Omer Castellanos MD.  Luis Alfredo reports anal pain and bleeding for the last 8 months.  He was seen by Dr. Montes in December of 2020 and started on nitroglycerin ointment and had significant improvement in his pain.  He was also using MiraLax daily.   He discontinued the ointment in January because he felt so much better.  Then he travelled in February and returned in March and his pain returned.  His pain is now so severe that it often lasts most of the day and sitting is very uncomfortable.  He also noted drainage from the anal area and burning of the perianal skin.       History of anal surgery:   none     Last Colonoscopy: 06/26/2018   Provider:  Dr. Carmona  Findings:  2 colon polyps and mild diverticulosis      PAST MEDICAL HISTORY:  Past Medical History:   Diagnosis Date   • Diabetes mellitus type II     doesnt check sugar   • Hyperlipidemia    • Hypertension    • Hypothyroidism    • Personal history of traumatic fracture     fingers   • Pneumothorax           PAST SURGICAL HISTORY:  Past Surgical History:   Procedure Laterality Date   • Appendectomy     • Cataract extraction w/ intraocular lens implant Right 12/18/2017   • Chest tube insertion      spontaneous pneumothorax   • Colonoscopy diagnostic  11/01/2006    Colonoscopy, Dx   • Excision of lingual tonsil     • Eye surgery     • Service to gastroenterology      colonoscoy     FAMILY HISTORY:     Family History   Problem Relation Age of Onset   • Heart disease Father    • Cataracts Maternal Aunt        SOCIAL HISTORY:  Social History     Socioeconomic History   • Marital status: /Civil Union     Spouse name: Not on file   • Number of children: 4   • Years of education: Not on file   • Highest education level: Not on file   Occupational History   • Not on file   Tobacco Use   • Smoking status: Former Smoker     Packs/day: 0.00   • Smokeless tobacco: Never Used   Substance and Sexual Activity   • Alcohol use: Yes     Alcohol/week: 0.0 standard drinks     Comment: socially   • Drug use: No   • Sexual activity: Not on file   Other Topics Concern   • Not on file   Social History Narrative   • Not on file     Social Determinants of Health     Financial Resource Strain: Not on file   Food Insecurity: Not on file   Transportation Needs:    • Lack of Transportation (Medical):    • Lack of Transportation (Non-Medical):    Physical Activity:    • Days of Exercise per Week:    • Minutes of Exercise per Session:    Stress: Not on file   Social Connections:    • Social Determinants: Social Connections:    Intimate Partner Violence: Not At Risk   • Social Determinants: Intimate Partner Violence Past Fear: 1   • Social Determinants: Intimate Partner Violence Current Fear: 1     CURRENT MEDICATIONS:  Outpatient Medications Marked as Taking for the 4/19/21 encounter (Office Visit) with Crystal Landeros MD   Medication Sig Dispense Refill   • Januvia 100 MG tablet TAKE 1 TABLET BY MOUTH EVERY DAY 90 tablet 0   •  gemfibrozil (LOPID) 600 MG tablet TAKE 1 TABLET BY MOUTH TWICE A  tablet 0   • glipiZIDE (GLUCOTROL) 2.5 MG 24 hr tablet TAKE 1 TABLET BY MOUTH EVERY DAY 90 tablet 1   • levothyroxine 150 MCG tablet TAKE 1 TABLET BY MOUTH DAILY 90 tablet 2   • finasteride (PROSCAR) 5 MG tablet TAKE 1 TABLET BY MOUTH EVERY DAY 90 tablet 1   • amLODIPine (NORVASC) 5 MG tablet Take 1 tablet by mouth daily. 30 tablet 0   • lisinopril (ZESTRIL) 20 MG tablet Take 1 tablet by mouth daily. 90 tablet 3   • metFORMIN (GLUCOPHAGE) 500 MG tablet TAKE 1 TABLET BY MOUTH TWICE A DAY WITH MEALS 180 tablet 1   • tamsulosin (FLOMAX) 0.4 MG Cap Take 1 capsule by mouth daily. One half hour after evening meal 90 capsule 0   • simvastatin (ZOCOR) 40 MG tablet Take 1 tablet by mouth nightly. 90 tablet 2   • aspirin 81 MG tablet Take 1 tablet by mouth daily.     • amoxicillin-clavulanate (AUGMENTIN) 875-125 MG per tablet Take 1 tablet by mouth 2 times daily. 20 tablet 0   • tadalafil (CIALIS) 20 MG tablet Take 1 tablet by mouth as needed for Erectile Dysfunction. 30 tablet 1   • Coenzyme Q10 (CO Q 10 PO)        ALLERGIES:  ALLERGIES:  No Known Allergies    REVIEW OF SYSTEMS:  Constitutional: Negative for chills, fever and unexpected weight change.    Respiratory: Negative for cough, shortness of breath and wheezing. Cardiovascular: Negative for chest pain, palpitations and leg swelling.   Gastrointestinal: Negative for abdominal distention, abdominal pain, diarrhea, nausea and vomiting.       PHYSICAL EXAM:    Alert, NAD, afebrile, stable vitals as above.  HEENT: no scleral icterus, EOMs intact  Neuro: cranial nerves grossly intact  Extremities: no edema  Skin: without rashes, cyanosis, or jaundice.  Anorectal exam:  Normal tone, no perianal rash, no enlarged external hemorrhoids. Anal fissure without skin tag at the PML and AML.  In the PML there is also a nodule with granulation tissue consistent with subcutaneous fistula tract from the PML  fissure.  There is small amount of purulent drainage from this.  Digital exam deferred.     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      We discussed risks and benefits of the surgery and he would like to proceed.         Crystal Leiva M.D.                                  Abdominal Pain, N/V/D

## 2024-02-02 NOTE — ED PROVIDER NOTE - PHYSICAL EXAMINATION
Constitutional: awake and alert, in no acute distress  HEENT: head normocephalic and atraumatic. moist mucous membranes  Eyes: extraocular movements intact, normal conjunctiva  Neck: supple, normal ROM  Cardiovascular: regular rate   Pulmonary: no respiratory distress, no wheezes, no crackles,   Gastrointestinal: abdomen obese,  nondistended, mid epigastric tender,   Skin: warm, dry, normal for ethnicity  Musculoskeletal: no edema, no deformity, NROM, good distal pulses  Neurological: oriented x4, no focal neurologic deficit.   Psychiatric: calm and cooperative, no SI/HI

## 2024-02-02 NOTE — PROGRESS NOTE ADULT - ASSESSMENT
Ms Emma Whipple is a 42 y/o F w/ PMH  SLE (on plaquinel), HTN, anemia, heavy menstrual cycles (saturated 12-14 tampons per 24 hours), uterine fibroid, L ovary cyst, spinal stenosis (daily NSAID use) who presented with c/o mid epigastric pain/mid chest pain, generalized fatigue, and SOB for 3 days, was found to have STEMI and 100% occlusion of the proximal LAD, s/p cath lab and stent to prox LAD. Admitted to CCU for monitoring.    NEURO  no active issues    CARDS  #STEMI  s/p PCI w/ stent to proximal LAD via L radial access. s/p aspirin and brillinta load.  -brilinta maintenance dose  -aspirin maintenance dose  -atorvastatin 80mg q24  -monitor and deflate trans-radial band as appropriate  -f/u limited TTE  -f/u complete TTE  -f/u daily EKGs  -arrange for close cardiology f/u  -f/u lipid panel  -f/u A1C    #HTN  On home amlodipine 10mg q24. Normotensive on admission and post-cath.  -hold amlodipine for now and monitor pressures  -restart amlodipine vs. start ACE/ARB as appropriate     #Palpitations  Pt endorses several-year hx of palpitations which occur randomly. Reports that she saw a cardiologist for evaluation, holter monitor read some "hiccups" but was never told she had an arrythmia. Was told it could be attributed to her anemia or SLE.   -daily EKGs  -ensure proper cardiology f/u     RESP  no active issues    GI  no active issues      #Menorrhagia   Pt reports heavy menstrual cycles, saturating 12-14 tampons in 24 hours. Says she has been evaluated by hematology in the past. She has a uterine fibroid and L ovary cyst.  -maintain active type and screen  -transfuse for Hg < 8     ENDO  f/u A1C    RENAL  no active issues    RHEUM  Patient w/ SLE, on plaquenil 200mg BID   -continue plaquenil 200mg BID    HEME  #Iron-deficiency anemia  Likely 2/2 blood loss iso heavy menstrual cycles and uterine fibroid  -maintain active type and screen  -transfused for Hg < 8    ID  no active issues    Prophylactic Measures:  D: DASH  E: K > 4; Mg > 2  F: none  DVT Prophylaxis: SCDs for now  Ms Emma Whipple is a 42 y/o F w/ PMH  SLE (on plaquinel), HTN, anemia, heavy menstrual cycles (saturated 12-14 tampons per 24 hours), uterine fibroid, L ovary cyst, spinal stenosis (daily NSAID use) who presented with c/o mid epigastric pain/mid chest pain, generalized fatigue, and SOB for 3 days, was found to have STEMI and 100% occlusion of the proximal LAD, s/p cath lab and stent to prox LAD. Admitted to CCU for monitoring.    NEURO  no active issues    CARDS  #STEMI  s/p PCI w/ stent to proximal LAD via L radial access. s/p aspirin and brillinta load.  - metoprolol tartrate 25mg BID  -brilinta maintenance dose  -aspirin maintenance dose  -atorvastatin 80mg q24  -f/u complete TTE  -f/u daily EKGs  -arrange for close cardiology f/u  -f/u lipid panel  -f/u A1C    #HTN  On home amlodipine 10mg q24. Normotensive on admission and post-cath.  -hold amlodipine for now and monitor pressures  -restart amlodipine vs. start ACE/ARB as appropriate     #Palpitations  Pt endorses several-year hx of palpitations which occur randomly. Reports that she saw a cardiologist for evaluation, holter monitor read some "hiccups" but was never told she had an arrythmia. Was told it could be attributed to her anemia or SLE.   -daily EKGs  -ensure proper cardiology f/u     RESP  no active issues    GI  no active issues      #Menorrhagia   Pt reports heavy menstrual cycles, saturating 12-14 tampons in 24 hours. Says she has been evaluated by hematology in the past. She has a uterine fibroid and L ovary cyst.  -maintain active type and screen  -transfuse for Hg < 8     ENDO  f/u A1C    RENAL  no active issues    RHEUM  Patient w/ SLE, on plaquenil 200mg BID   -continue plaquenil 200mg BID    HEME  #Iron-deficiency anemia  Likely 2/2 blood loss iso heavy menstrual cycles and uterine fibroid  -maintain active type and screen  -transfused for Hg < 8    ID  no active issues    Prophylactic Measures:  D: DASH  E: K > 4; Mg > 2  F: none  DVT Prophylaxis: SCDs for now

## 2024-02-02 NOTE — PROGRESS NOTE ADULT - SUBJECTIVE AND OBJECTIVE BOX
**INCOMPLETE NOTE    OVERNIGHT EVENTS:    SUBJECTIVE:  Patient seen and examined at bedside.    Vital Signs Last 12 Hrs  T(F): 98.9 (02-02-24 @ 06:19), Max: 98.9 (02-01-24 @ 22:41)  HR: 76 (02-02-24 @ 06:00) (76 - 90)  BP: 121/66 (02-02-24 @ 06:00) (121/66 - 146/81)  BP(mean): 89 (02-02-24 @ 06:00) (89 - 89)  RR: 26 (02-02-24 @ 06:00) (15 - 26)  SpO2: 100% (02-02-24 @ 06:00) (99% - 100%)  I&O's Summary    01 Feb 2024 07:01  -  02 Feb 2024 07:00  --------------------------------------------------------  IN: 0 mL / OUT: 300 mL / NET: -300 mL        PHYSICAL EXAM:  Constitutional: NAD, comfortable in bed.  HEENT: NC/AT, PERRLA, EOMI, no conjunctival pallor or scleral icterus, MMM  Neck: Supple, no JVD  Respiratory: CTA B/L. No w/r/r.   Cardiovascular: RRR, normal S1 and S2, no m/r/g.   Gastrointestinal: +BS, soft NTND, no guarding or rebound tenderness, no palpable masses   Extremities: wwp; no cyanosis, clubbing or edema.   Vascular: Pulses equal and strong throughout.   Neurological: AAOx3, no CN deficits, strength and sensation intact throughout.   Skin: No gross skin abnormalities or rashes        LABS:                        10.0   4.28  )-----------( 285      ( 02 Feb 2024 06:31 )             32.3     02-01    133<L>  |  98  |  12  ----------------------------<  203<H>  3.1<L>   |  22  |  0.73    Ca    9.5      01 Feb 2024 23:03      PT/INR - ( 01 Feb 2024 23:03 )   PT: 11.6 sec;   INR: 1.02          PTT - ( 02 Feb 2024 06:31 )  PTT:97.6 sec  Urinalysis Basic - ( 01 Feb 2024 23:03 )    Color: x / Appearance: x / SG: x / pH: x  Gluc: 203 mg/dL / Ketone: x  / Bili: x / Urobili: x   Blood: x / Protein: x / Nitrite: x   Leuk Esterase: x / RBC: x / WBC x   Sq Epi: x / Non Sq Epi: x / Bacteria: x          RADIOLOGY & ADDITIONAL TESTS:    MEDICATIONS  (STANDING):  aspirin enteric coated 81 milliGRAM(s) Oral every 24 hours  atorvastatin 80 milliGRAM(s) Oral at bedtime  hydroxychloroquine 200 milliGRAM(s) Oral every 12 hours  ticagrelor 90 milliGRAM(s) Oral every 12 hours    MEDICATIONS  (PRN):   ***************TRANSFER NOTE FROM CCU TO CARDIAC TELE************  HOSPITAL COURSE:  Ms Emma Whipple is a 42 y/o F w/ PMH  SLE (on plaquinel), HTN, anemia, heavy menstrual cycles (saturated 12-14 tampons per 24 hours), uterine fibroid, L ovary cyst, spinal stenosis (daily NSAID use) who presented with c/o mid epigastric pain/mid chest pain, generalized fatigue, and SOB for 3 days. Found to have STEMI s/p PCI with stent placed in pLAD due to 100% occlusion. Access site of R radial artery, s/p removal of TR band. Started on ASA, Brilinta, statin. No immediate complications noted post procedure. VS stable overnight. Started metoprolol tartrate 25mg BID. Stable for stepdown to cardiac tele with plan for starting appropriate GDMT pending formal TTE.      OVERNIGHT EVENTS:    SUBJECTIVE:  Patient seen and examined at bedside.    Vital Signs Last 12 Hrs  T(F): 98.9 (02-02-24 @ 06:19), Max: 98.9 (02-01-24 @ 22:41)  HR: 76 (02-02-24 @ 06:00) (76 - 90)  BP: 121/66 (02-02-24 @ 06:00) (121/66 - 146/81)  BP(mean): 89 (02-02-24 @ 06:00) (89 - 89)  RR: 26 (02-02-24 @ 06:00) (15 - 26)  SpO2: 100% (02-02-24 @ 06:00) (99% - 100%)  I&O's Summary    01 Feb 2024 07:01  -  02 Feb 2024 07:00  --------------------------------------------------------  IN: 0 mL / OUT: 300 mL / NET: -300 mL        PHYSICAL EXAM:  Constitutional: NAD, comfortable in bed.  HEENT: NC/AT, PERRLA, EOMI, no conjunctival pallor or scleral icterus, MMM  Neck: Supple, no JVD  Respiratory: CTA B/L. No w/r/r.   Cardiovascular: RRR, normal S1 and S2, no m/r/g.   Gastrointestinal: +BS, soft NTND, no guarding or rebound tenderness, no palpable masses   Extremities: wwp; no cyanosis, clubbing or edema.   Vascular: Pulses equal and strong throughout.   Neurological: AAOx3, no CN deficits, strength and sensation intact throughout.   Skin: No gross skin abnormalities or rashes        LABS:                        10.0   4.28  )-----------( 285      ( 02 Feb 2024 06:31 )             32.3     02-01    133<L>  |  98  |  12  ----------------------------<  203<H>  3.1<L>   |  22  |  0.73    Ca    9.5      01 Feb 2024 23:03      PT/INR - ( 01 Feb 2024 23:03 )   PT: 11.6 sec;   INR: 1.02          PTT - ( 02 Feb 2024 06:31 )  PTT:97.6 sec  Urinalysis Basic - ( 01 Feb 2024 23:03 )    Color: x / Appearance: x / SG: x / pH: x  Gluc: 203 mg/dL / Ketone: x  / Bili: x / Urobili: x   Blood: x / Protein: x / Nitrite: x   Leuk Esterase: x / RBC: x / WBC x   Sq Epi: x / Non Sq Epi: x / Bacteria: x          RADIOLOGY & ADDITIONAL TESTS:    MEDICATIONS  (STANDING):  aspirin enteric coated 81 milliGRAM(s) Oral every 24 hours  atorvastatin 80 milliGRAM(s) Oral at bedtime  hydroxychloroquine 200 milliGRAM(s) Oral every 12 hours  ticagrelor 90 milliGRAM(s) Oral every 12 hours    MEDICATIONS  (PRN):

## 2024-02-02 NOTE — PROGRESS NOTE ADULT - ASSESSMENT
Ms Emma Whipple is a 42 y/o F w/ PMH  SLE (on plaquinel), HTN, anemia, heavy menstrual cycles (saturated 12-14 tampons per 24 hours), uterine fibroid, L ovary cyst, spinal stenosis (daily NSAID use) who presented with c/o mid epigastric pain/mid chest pain, generalized fatigue, and SOB for 3 days, was found to have STEMI and 100% occlusion of the proximal LAD, s/p cath lab and stent to prox LAD. Stepped down from CCU to telemetry for further monitoring.

## 2024-02-02 NOTE — H&P ADULT - NSHPPHYSICALEXAM_GEN_ALL_CORE
General: comfortable  HEENT: PERRLA, EOMI, no scleral icterus  Cards: RRR; no murmurs  Pulm: CTA b/l; no increased wob  GI: soft, nontender, nondistended  Extremities: warm, no pitting edema  Vascular: 2+ throughout  Skin: no scars, wounds, rashes  Neuro: A&Ox3 General: NAD  HEENT: PERRLA, EOMI, no scleral icterus  Cards: RRR; no murmurs  Pulm: CTA b/l; no increased wob  GI: soft, nontender, nondistended  Extremities: warm, no pitting edema  Vascular: 2+ throughout  Skin: no scars, wounds, rashes  Neuro: A&Ox3 General: NAD  HEENT: PERRLA, EOMI, no scleral icterus  Cards: RRR; no murmurs  Pulm: CTA b/l; no increased wob  GI: soft, nontender, nondistended  Extremities: warm, no pitting edema  Vascular: 1+ throughout  Skin: no scars, wounds, rashes  Neuro: A&Ox3

## 2024-02-02 NOTE — H&P ADULT - NSICDXPASTMEDICALHX_GEN_ALL_CORE_FT
PAST MEDICAL HISTORY:  Anemia     HTN (hypertension)     Menorrhagia     Palpitation     SLE (systemic lupus erythematosus)     Spinal stenosis

## 2024-02-02 NOTE — ED PROVIDER NOTE - ATTESTATION, MLM
History     Chief Complaint   Patient presents with     Shortness of Breath     Fever     HPI  Ethan Watt is a 38 year old female who is 16 weeks gestation, IUP who presents to the emergency department secondary to multiple symptoms including feeling like she has an ear infection bilaterally, shortness of breath with exertion gets better with rest, dry cough, low-grade fever up to 101.  Her symptoms started a couple of days ago.  She has not had change in taste or smell.  She has had intermittent mild headaches and some body aches and fatigue.  She does have a history of otitis and has perforation of left tympanic membrane.  She has had to see ENT in the past for her infections.  The last time she had her ear canals cleaned out and antibiotic with steroid was used which seemed to help quite a bit.  She is normally treated with drops instead of oral antibiotics.  Her son has a runny nose and was vomiting recently.  No known Covid contacts.  Her son is in .    Allergies:  No Known Allergies    Problem List:    Patient Active Problem List    Diagnosis Date Noted     Multigravida of advanced maternal age in second trimester 03/23/2021     Priority: Medium     Thrombocytopenia (H) 03/04/2019     Priority: Medium     Repeat Platelets weekly.  Clinically impression still is gestational thrombocytopenia. No further testing/special management as long as platelets remain above 70k. Per Hematology       Normal pregnancy in second trimester 12/31/2018     Priority: Medium     AFP=negative, RPR=negative, normal first trimester screening, normal pap smear 10/22 with negative HPV, negative GC and Chlamydia, A- antibody negative, RI, Pyyi=894, HIV=negative, HBsAg=negative 10/10    Impression  =========     1) Love intrauterine pregnancy at 24 weeks 4 days gestational age.  2) None of the anomalies commonly detected by ultrasound were evident in the detailed fetal anatomic survey as described above.  3) Growth  parameters and estimated fetal weight were consistent with established dates.  4) The amniotic fluid volume appeared normal.  5) Normal fetal activity for gestational age.  6) On transabdominal imaging the cervix appears long and closed.     Recommendation  ==============     Thank-you for referring your patient for a targeted ultrasound due to AMA. I reviewed the patient's screening results. She is unsure if she had aneuploidy screening done in  [place]. Her records have been requested but are not yet available.     I discussed the findings on today's ultrasound with the patient and her partner. I reviewed the limitations of ultrasound. We discussed the availability of amniocentesis for  the precise diagnosis of chromosomal abnormalities including the associated procedure-related risk of pregnancy loss of approximately 1/400. The patient is going to wait  on further aneuploidy screening as she believes that she might have had this done already in [place].     We also reviewed the limitations of US to detect isolated cleft palate (patient had cleft lip).     Return to primary provider for continued prenatal care. No further MFM US is indicated at this time.       Need for Tdap vaccination 12/31/2018     Priority: Medium     Calculus of gallbladder 12/31/2018     Priority: Medium     H/O LEEP 12/31/2018     Priority: Medium     LEEP- unknown date or results.   10/22/18 NIL pap, Neg HPV (Found in abstracted records)  3/23/21 NIL pap, Neg HPV. Plan cotest in 1 year due bef 3/23/22.  3/31/21 Result sent to pt via [facility].        Rh negative state in antepartum period 12/31/2018     Priority: Medium        Past Medical History:    History reviewed. No pertinent past medical history.    Past Surgical History:    Past Surgical History:   Procedure Laterality Date     LAPAROSCOPIC CHOLECYSTECTOMY N/A 01/16/2019    Procedure: LAPAROSCOPIC CHOLECYSTECTOMY;  Surgeon: Davey Rinaldi MD;  Location: [facility] OR     LEEP TX, CERVICAL          Family History:    Family History   Problem Relation Age of Onset     Myocardial Infarction Maternal Grandfather      Other - See Comments Maternal Grandfather         stroke     Other - See Comments Maternal Uncle         stroke       Social History:  Marital Status:   [2]  Social History     Tobacco Use     Smoking status: Former Smoker     Quit date: 2014     Years since quittin.4     Smokeless tobacco: Never Used   Substance Use Topics     Alcohol use: Not Currently     Frequency: Never     Drug use: No        Medications:    cefdinir (OMNICEF) 300 MG capsule  Prenatal Vit-Fe Fumarate-FA (PRENATAL MULTIVITAMIN W/IRON) 27-0.8 MG tablet  UNABLE TO FIND          Review of Systems   All other systems reviewed and are negative.      Physical Exam   BP: 125/66  Pulse: 102  Temp: 99.6  F (37.6  C)  Resp: 16  Weight: 73.9 kg (163 lb)  SpO2: 100 %      Physical Exam  Vitals signs and nursing note reviewed.   Constitutional:       General: She is not in acute distress.     Appearance: She is well-developed. She is not ill-appearing or diaphoretic.      Interventions: She is not intubated.  HENT:      Head: Normocephalic and atraumatic.      Right Ear: No laceration, drainage, swelling or tenderness. A middle ear effusion is present. There is no impacted cerumen. No foreign body. Tympanic membrane is not injected, perforated, erythematous or retracted.      Left Ear: No laceration, drainage, swelling or tenderness. A middle ear effusion is present. There is no impacted cerumen. No foreign body. Tympanic membrane is perforated. Tympanic membrane is not injected, erythematous or retracted.      Mouth/Throat:      Mouth: Mucous membranes are moist.      Pharynx: Oropharynx is clear. No pharyngeal swelling.   Eyes:      General: No scleral icterus.     Extraocular Movements: Extraocular movements intact.      Pupils: Pupils are equal, round, and reactive to light.   Neck:      Musculoskeletal: Normal  range of motion and neck supple.   Cardiovascular:      Rate and Rhythm: Normal rate and regular rhythm.   Pulmonary:      Effort: Pulmonary effort is normal. No tachypnea or accessory muscle usage. She is not intubated.      Breath sounds: No stridor. Examination of the right-lower field reveals rales. Rales present. No decreased breath sounds, wheezing or rhonchi.   Musculoskeletal: Normal range of motion.      Right lower leg: No edema.      Left lower leg: No edema.   Skin:     General: Skin is warm and dry.      Coloration: Skin is not pale.      Findings: No erythema or rash.   Neurological:      General: No focal deficit present.      Mental Status: She is alert and oriented to person, place, and time.   Psychiatric:         Mood and Affect: Mood normal.         ED Course        Procedures                   Results for orders placed or performed during the hospital encounter of 05/17/21 (from the past 24 hour(s))   CBC with platelets differential   Result Value Ref Range    WBC 16.7 (H) 4.0 - 11.0 10e9/L    RBC Count 3.93 3.8 - 5.2 10e12/L    Hemoglobin 12.5 11.7 - 15.7 g/dL    Hematocrit 36.2 35.0 - 47.0 %    MCV 92 78 - 100 fl    MCH 31.8 26.5 - 33.0 pg    MCHC 34.5 31.5 - 36.5 g/dL    RDW 12.5 10.0 - 15.0 %    Platelet Count 165 150 - 450 10e9/L    Diff Method Automated Method     % Neutrophils 86.7 %    % Lymphocytes 4.9 %    % Monocytes 7.4 %    % Eosinophils 0.1 %    % Basophils 0.2 %    % Immature Granulocytes 0.7 %    Nucleated RBCs 0 0 /100    Absolute Neutrophil 14.5 (H) 1.6 - 8.3 10e9/L    Absolute Lymphocytes 0.8 0.8 - 5.3 10e9/L    Absolute Monocytes 1.2 0.0 - 1.3 10e9/L    Absolute Eosinophils 0.0 0.0 - 0.7 10e9/L    Absolute Basophils 0.0 0.0 - 0.2 10e9/L    Abs Immature Granulocytes 0.1 0 - 0.4 10e9/L    Absolute Nucleated RBC 0.0    Comprehensive metabolic panel   Result Value Ref Range    Sodium 134 133 - 144 mmol/L    Potassium 3.4 3.4 - 5.3 mmol/L    Chloride 104 94 - 109 mmol/L    Carbon  Dioxide 26 20 - 32 mmol/L    Anion Gap 4 3 - 14 mmol/L    Glucose 89 70 - 99 mg/dL    Urea Nitrogen 5 (L) 7 - 30 mg/dL    Creatinine 0.55 0.52 - 1.04 mg/dL    GFR Estimate >90 >60 mL/min/[1.73_m2]    GFR Estimate If Black >90 >60 mL/min/[1.73_m2]    Calcium 8.8 8.5 - 10.1 mg/dL    Bilirubin Total 0.3 0.2 - 1.3 mg/dL    Albumin 3.1 (L) 3.4 - 5.0 g/dL    Protein Total 7.4 6.8 - 8.8 g/dL    Alkaline Phosphatase 59 40 - 150 U/L    ALT 14 0 - 50 U/L    AST 15 0 - 45 U/L   Nt probnp inpatient (BNP)   Result Value Ref Range    N-Terminal Pro BNP Inpatient 234 0 - 450 pg/mL       Medications - No data to display    Assessments & Plan (with Medical Decision Making)  38 yr old female with sob,cough fever.  Swab for covid performed along with labs. Patient declined xray given her pregnancy and history of  miscarriage which is reasonable.  She is not hypoxic so I think is okay to wait.  Leukocytosis would be unusual for covid.  Also with otitis bilaterally and is pregnant so could be contributing to leukocytosis.  Will treat with oral antibiotics in case she has pneumonia and that should cover her otitis as well.  No allergies.  rx omnicef  Return to er precautions, follow up precautions discussed.       I have reviewed the nursing notes.    I have reviewed the findings, diagnosis, plan and need for follow up with the patient.      New Prescriptions    CEFDINIR (OMNICEF) 300 MG CAPSULE    Take 1 capsule (300 mg) by mouth 2 times daily       Final diagnoses:   Bilateral acute serous otitis media, recurrence not specified   Pneumonia due to infectious organism, unspecified laterality, unspecified part of lung       5/17/2021   [facility] EMERGENCY DEPT     Fernando Tomlinson MD  05/17/21 0320     I have reviewed and confirmed nurses' notes for patient's medications, allergies, medical history, and surgical history.

## 2024-02-02 NOTE — PROGRESS NOTE ADULT - PROBLEM SELECTOR PLAN 5
Pt reports heavy menstrual cycles, saturating 12-14 tampons in 24 hours. Says she has been evaluated by hematology in the past. She has a uterine fibroid and L ovary cyst.  -maintain active type and screen  -transfuse for Hg < 8 Patient w/ SLE, on plaquenil 200mg BID   -continue plaquenil 200mg BID

## 2024-02-02 NOTE — DIETITIAN INITIAL EVALUATION ADULT - ADD RECOMMEND
>> Pt may benefit from further Medical Nutrition Therapy after discharge. Recommend to follow up with [facility] for continuity of care. Email [email or web address] or call [phone].  >> continue with current diet order  - honor food preferences as able  >> Monitor chemistry, GI function, skin integrity  >> Pain & bowel regimen per team

## 2024-02-02 NOTE — ED PROVIDER NOTE - OBJECTIVE STATEMENT
42 yo female with h/o SLE, HTN in the ER c/o mid epigastric pain/mid chest pain, generalized fatigue, some SOB. Pt states her symptoms started 4-5 days ago. Pt felt bloated and took miralax that  was helping initially, but indigestion-like pain persisted. Pt reports tonight she was feeling very 44 yo female with h/o SLE, HTN, anemia in the ER c/o mid epigastric pain/mid chest pain, generalized fatigue, some SOB. Pt states her symptoms started 4-5 days ago. Pt felt bloated and took miralax that  was helping initially, but indigestion-like pain persisted. Pt reports tonight pain started again, at this time radiating to her mid back, with nausea. Pt vomit on her way to ER. Denies fever, chills, cough, diarrhea, bloody stool. Mentioned strated her menses today. Last BM- yesterday. pt denies prior cardiac h/o or h/o dvt/pe.

## 2024-02-02 NOTE — PROGRESS NOTE ADULT - ATTENDING COMMENTS
43/F with SLE on rx, presented with worseing CP with LHC showing 100% prox LAD occlusion s/p PCI.   TTE: LV EF 30-35%, normal RV, no significant valvular abnl    Imp:  ACS, NSTEMI  CAD s/p PCI to pLAD  HFrEF  SLE    Plan:  cont DAPT and statin  start metoprolol tartrate 25mg BID  will add ARNI/MRA and SGLT2i next given EF<35%  no device  rpt ECHO after GDMT and decide on ICD    step down to Cardiology tele

## 2024-02-03 ENCOUNTER — TRANSCRIPTION ENCOUNTER (OUTPATIENT)
Age: 44
End: 2024-02-03

## 2024-02-03 LAB
ALBUMIN SERPL ELPH-MCNC: 3.7 G/DL — SIGNIFICANT CHANGE UP (ref 3.3–5)
ALBUMIN SERPL ELPH-MCNC: 4.1 G/DL — SIGNIFICANT CHANGE UP (ref 3.3–5)
ALP SERPL-CCNC: 67 U/L — SIGNIFICANT CHANGE UP (ref 40–120)
ALP SERPL-CCNC: 74 U/L — SIGNIFICANT CHANGE UP (ref 40–120)
ALT FLD-CCNC: 39 U/L — SIGNIFICANT CHANGE UP (ref 10–45)
ALT FLD-CCNC: 42 U/L — SIGNIFICANT CHANGE UP (ref 10–45)
ANION GAP SERPL CALC-SCNC: 11 MMOL/L — SIGNIFICANT CHANGE UP (ref 5–17)
ANION GAP SERPL CALC-SCNC: 11 MMOL/L — SIGNIFICANT CHANGE UP (ref 5–17)
AST SERPL-CCNC: 192 U/L — HIGH (ref 10–40)
AST SERPL-CCNC: 236 U/L — HIGH (ref 10–40)
BILIRUB SERPL-MCNC: 0.2 MG/DL — SIGNIFICANT CHANGE UP (ref 0.2–1.2)
BILIRUB SERPL-MCNC: 0.4 MG/DL — SIGNIFICANT CHANGE UP (ref 0.2–1.2)
BUN SERPL-MCNC: 10 MG/DL — SIGNIFICANT CHANGE UP (ref 7–23)
BUN SERPL-MCNC: 11 MG/DL — SIGNIFICANT CHANGE UP (ref 7–23)
CALCIUM SERPL-MCNC: 8.9 MG/DL — SIGNIFICANT CHANGE UP (ref 8.4–10.5)
CALCIUM SERPL-MCNC: 9 MG/DL — SIGNIFICANT CHANGE UP (ref 8.4–10.5)
CHLORIDE SERPL-SCNC: 100 MMOL/L — SIGNIFICANT CHANGE UP (ref 96–108)
CHLORIDE SERPL-SCNC: 101 MMOL/L — SIGNIFICANT CHANGE UP (ref 96–108)
CO2 SERPL-SCNC: 22 MMOL/L — SIGNIFICANT CHANGE UP (ref 22–31)
CO2 SERPL-SCNC: 24 MMOL/L — SIGNIFICANT CHANGE UP (ref 22–31)
CREAT SERPL-MCNC: 0.82 MG/DL — SIGNIFICANT CHANGE UP (ref 0.5–1.3)
CREAT SERPL-MCNC: 0.93 MG/DL — SIGNIFICANT CHANGE UP (ref 0.5–1.3)
EGFR: 79 ML/MIN/1.73M2 — SIGNIFICANT CHANGE UP
EGFR: 92 ML/MIN/1.73M2 — SIGNIFICANT CHANGE UP
GLUCOSE SERPL-MCNC: 141 MG/DL — HIGH (ref 70–99)
GLUCOSE SERPL-MCNC: 142 MG/DL — HIGH (ref 70–99)
HCT VFR BLD CALC: 33.5 % — LOW (ref 34.5–45)
HGB BLD-MCNC: 10.3 G/DL — LOW (ref 11.5–15.5)
LACTATE SERPL-SCNC: 1.3 MMOL/L — SIGNIFICANT CHANGE UP (ref 0.5–2)
MAGNESIUM SERPL-MCNC: 1.8 MG/DL — SIGNIFICANT CHANGE UP (ref 1.6–2.6)
MCHC RBC-ENTMCNC: 21.7 PG — LOW (ref 27–34)
MCHC RBC-ENTMCNC: 30.7 GM/DL — LOW (ref 32–36)
MCV RBC AUTO: 70.7 FL — LOW (ref 80–100)
NRBC # BLD: 0 /100 WBCS — SIGNIFICANT CHANGE UP (ref 0–0)
PHOSPHATE SERPL-MCNC: 3.1 MG/DL — SIGNIFICANT CHANGE UP (ref 2.5–4.5)
PLATELET # BLD AUTO: 286 K/UL — SIGNIFICANT CHANGE UP (ref 150–400)
POTASSIUM SERPL-MCNC: 4 MMOL/L — SIGNIFICANT CHANGE UP (ref 3.5–5.3)
POTASSIUM SERPL-MCNC: 4.1 MMOL/L — SIGNIFICANT CHANGE UP (ref 3.5–5.3)
POTASSIUM SERPL-SCNC: 4 MMOL/L — SIGNIFICANT CHANGE UP (ref 3.5–5.3)
POTASSIUM SERPL-SCNC: 4.1 MMOL/L — SIGNIFICANT CHANGE UP (ref 3.5–5.3)
PROT SERPL-MCNC: 7.9 G/DL — SIGNIFICANT CHANGE UP (ref 6–8.3)
PROT SERPL-MCNC: 8.2 G/DL — SIGNIFICANT CHANGE UP (ref 6–8.3)
RBC # BLD: 4.74 M/UL — SIGNIFICANT CHANGE UP (ref 3.8–5.2)
RBC # FLD: 14.2 % — SIGNIFICANT CHANGE UP (ref 10.3–14.5)
SODIUM SERPL-SCNC: 134 MMOL/L — LOW (ref 135–145)
SODIUM SERPL-SCNC: 135 MMOL/L — SIGNIFICANT CHANGE UP (ref 135–145)
WBC # BLD: 4.76 K/UL — SIGNIFICANT CHANGE UP (ref 3.8–10.5)
WBC # FLD AUTO: 4.76 K/UL — SIGNIFICANT CHANGE UP (ref 3.8–10.5)

## 2024-02-03 PROCEDURE — 99232 SBSQ HOSP IP/OBS MODERATE 35: CPT | Mod: GC

## 2024-02-03 RX ORDER — MAGNESIUM SULFATE 500 MG/ML
2 VIAL (ML) INJECTION ONCE
Refills: 0 | Status: COMPLETED | OUTPATIENT
Start: 2024-02-03 | End: 2024-02-03

## 2024-02-03 RX ORDER — SIMETHICONE 80 MG/1
80 TABLET, CHEWABLE ORAL EVERY 12 HOURS
Refills: 0 | Status: DISCONTINUED | OUTPATIENT
Start: 2024-02-03 | End: 2024-02-05

## 2024-02-03 RX ADMIN — Medication 12.5 MILLIGRAM(S): at 18:32

## 2024-02-03 RX ADMIN — SACUBITRIL AND VALSARTAN 1 TABLET(S): 24; 26 TABLET, FILM COATED ORAL at 05:38

## 2024-02-03 RX ADMIN — Medication 81 MILLIGRAM(S): at 21:49

## 2024-02-03 RX ADMIN — SACUBITRIL AND VALSARTAN 1 TABLET(S): 24; 26 TABLET, FILM COATED ORAL at 18:32

## 2024-02-03 RX ADMIN — Medication 200 MILLIGRAM(S): at 21:50

## 2024-02-03 RX ADMIN — ATORVASTATIN CALCIUM 80 MILLIGRAM(S): 80 TABLET, FILM COATED ORAL at 21:49

## 2024-02-03 RX ADMIN — CHLORHEXIDINE GLUCONATE 1 APPLICATION(S): 213 SOLUTION TOPICAL at 05:39

## 2024-02-03 RX ADMIN — TICAGRELOR 90 MILLIGRAM(S): 90 TABLET ORAL at 02:05

## 2024-02-03 RX ADMIN — SIMETHICONE 80 MILLIGRAM(S): 80 TABLET, CHEWABLE ORAL at 21:49

## 2024-02-03 RX ADMIN — Medication 12.5 MILLIGRAM(S): at 05:38

## 2024-02-03 RX ADMIN — Medication 25 GRAM(S): at 07:27

## 2024-02-03 RX ADMIN — TICAGRELOR 90 MILLIGRAM(S): 90 TABLET ORAL at 17:06

## 2024-02-03 RX ADMIN — Medication 200 MILLIGRAM(S): at 11:07

## 2024-02-03 NOTE — PROGRESS NOTE ADULT - PROBLEM SELECTOR PLAN 7
Likely 2/2 blood loss iso heavy menstrual cycles and uterine fibroid  -maintain active type and screen  -transfused for Hg < 8

## 2024-02-03 NOTE — PHYSICAL THERAPY INITIAL EVALUATION ADULT - PERTINENT HX OF CURRENT PROBLEM, REHAB EVAL
Patient is a 43 year old female Patient is a 43 year old female who presented with c/o mid epigastric pain/mid chest pain, generalized fatigue, and SOB for 3 days, was found to have STEMI and 100% occlusion of the proximal LAD, s/p cath lab and stent to prox LAD. Stepped down from CCU to telemetry for further monitoring.

## 2024-02-03 NOTE — PHYSICAL THERAPY INITIAL EVALUATION ADULT - GAIT DEVIATIONS NOTED, PT EVAL
patient with steady gait, no LOB, required standing rest breaks 2/2 SOB (O2 sats >97% throughout)/decreased danni/decreased step length

## 2024-02-03 NOTE — PROGRESS NOTE ADULT - SUBJECTIVE AND OBJECTIVE BOX
MANUELKARLA PARADA  43y  Female      Patient is a 43y old  Female who presents with a chief complaint of NSTEMI(NON-ST ELEVATIONMYOCARDIAL INFARCTION)     (02 Feb 2024 18:01)      INTERVAL HPI/OVERNIGHT EVENTS: ULICES overnight. This morning, patient denies any CP, palpitations, SOB, N/V/D/C or abdominal pain. Reports she ambulates with a cane and will have difficulty managing her children and home after discharge. Requested to speak with a . She will plan to work with PT today.     Vital Signs Last 24 Hrs  T(C): 36.9 (03 Feb 2024 09:45), Max: 37.1 (03 Feb 2024 04:13)  T(F): 98.4 (03 Feb 2024 09:45), Max: 98.8 (03 Feb 2024 04:13)  HR: 75 (03 Feb 2024 08:18) (75 - 98)  BP: 98/59 (03 Feb 2024 08:18) (98/59 - 140/86)  BP(mean): 72 (03 Feb 2024 08:18) (71 - 109)  RR: 18 (03 Feb 2024 08:18) (16 - 18)  SpO2: 100% (03 Feb 2024 08:18) (98% - 100%)    Parameters below as of 03 Feb 2024 08:18  Patient On (Oxygen Delivery Method): room air        PHYSICAL EXAM:  GENERAL: obese, NAD, pleasant and conversant   HEAD:  Atraumatic, Normocephalic  EYES: EOMI  ENMT: Moist mucous membranes  NECK: No JVD appreciated   NERVOUS SYSTEM:  Alert & Oriented X3, Good concentration  CHEST/LUNG: Clear to auscultation bilaterally; No rales, rhonchi, wheezing, or rubs  HEART: tachycardic with regular rhythm; No murmurs, rubs, or gallops  ABDOMEN: Soft, Nontender, Nondistended; Bowel sounds present  EXTREMITIES:  2+ Peripheral Pulses, No clubbing, cyanosis, or edema    Consultant(s) Notes Reviewed:  [x ] YES  [ ] NO  Care Discussed with Consultants/Other Providers [ x] YES  [ ] NO    LABS:                        10.3   4.76  )-----------( 286      ( 03 Feb 2024 05:30 )             33.5     02-03    135  |  100  |  10  ----------------------------<  142<H>  4.1   |  24  |  0.93    Ca    8.9      03 Feb 2024 05:30  Phos  3.1     02-03  Mg     1.8     02-03    TPro  7.9  /  Alb  3.7  /  TBili  0.4  /  DBili  x   /  AST  192<H>  /  ALT  39  /  AlkPhos  67  02-03    PT/INR - ( 01 Feb 2024 23:03 )   PT: 11.6 sec;   INR: 1.02          PTT - ( 02 Feb 2024 06:31 )  PTT:97.6 sec  Urinalysis Basic - ( 03 Feb 2024 05:30 )    Color: x / Appearance: x / SG: x / pH: x  Gluc: 142 mg/dL / Ketone: x  / Bili: x / Urobili: x   Blood: x / Protein: x / Nitrite: x   Leuk Esterase: x / RBC: x / WBC x   Sq Epi: x / Non Sq Epi: x / Bacteria: x        CAPILLARY BLOOD GLUCOSE          RADIOLOGY & ADDITIONAL TESTS:    Imaging Personally Reviewed:  [ ] YES  [ ] NO

## 2024-02-03 NOTE — DISCHARGE NOTE NURSING/CASE MANAGEMENT/SOCIAL WORK - PATIENT PORTAL LINK FT
You can access the FollowMyHealth Patient Portal offered by [facility] by registering at the following website: [email or web address]. By joining [employer]’s FollowMyHealth portal, you will also be able to view your health information using other applications (apps) compatible with our system.

## 2024-02-03 NOTE — PROGRESS NOTE ADULT - PROBLEM SELECTOR PLAN 1
s/p PCI w/ stent to proximal LAD via L radial access. s/p aspirin and brillinta load.   - metoprolol tartrate 12.5 mg BID  -brilinta 90 mg BID  -aspirin 81 mg QD  -atorvastatin 80mg q24  -f/u complete TTE  -f/u daily EKGs  -arrange for close cardiology f/u s/p PCI w/ stent to proximal LAD via L radial access. s/p aspirin and brillinta load. Complete TTE showing EF 30-35%  - metoprolol tartrate 12.5 mg BID  -brilinta 90 mg BID  -aspirin 81 mg QD  -atorvastatin 80mg q24  -f/u complete TTE  -f/u daily EKGs  -arrange for close cardiology f/u

## 2024-02-03 NOTE — DISCHARGE NOTE PROVIDER - NSDCMRMEDTOKEN_GEN_ALL_CORE_FT
amLODIPine 10 mg oral tablet: 1 tab(s) orally every 24 hours  Plaquenil 200 mg oral tablet: 1 tab(s) orally every 12 hours  ticagrelor 90 mg oral tablet: 1 tab(s) orally 2 times a day   aspirin 81 mg oral delayed release tablet: 1 tab(s) orally every 24 hours  atorvastatin 80 mg oral tablet: 1 tab(s) orally once a day (at bedtime)  Eliquis 5 mg oral tablet: 1 tab(s) orally every 12 hours Please take one tablet every 12 hours  metoprolol tartrate 25 mg oral tablet: 1 tab(s) orally every 12 hours Please take 1 tablet every 12 hours  Plaquenil 200 mg oral tablet: 1 tab(s) orally every 12 hours  sacubitril-valsartan 24 mg-26 mg oral tablet: 1 tab(s) orally every 12 hours  ticagrelor 90 mg oral tablet: 1 tab(s) orally every 12 hours Please take one tablet every 12 hours

## 2024-02-03 NOTE — DISCHARGE NOTE PROVIDER - HOSPITAL COURSE
#Discharge: do not delete    Ms Emma Whipple is a 42 y/o F w/ PMH  SLE (on plaquinel), HTN, anemia, heavy menstrual cycles (saturated 12-14 tampons per 24 hours), uterine fibroid, L ovary cyst, spinal stenosis (daily NSAID use) who presented with c/o mid epigastric pain/mid chest pain, generalized fatigue, and SOB for 3 days. Found to have STEMI s/p PCI with stent placed in pLAD due to 100% occlusion. Access site of R radial artery, s/p removal of TR band. Started on ASA, Brilinta, statin. No immediate complications noted post procedure. VS stable overnight. Started metoprolol tartrate 25mg BID. Stable for stepdown to cardiac tele. TTE obtained.     TTE:   1. Moderately reduced left ventricular systolic function, LVEF 30-35%.   2. Multiple segmental abnormalities exist. See findings.   3. Grade I left ventricular diastolic dysfunction.   4. Normal right ventricular size and systolic function.   5. No significant valvular disease.   6. No evidence of pulmonary hypertension, pulmonary artery systolic pressure is 25 mmHg.   7. Trivial pericardial effusion.    Hospital course (by problem):     #STEMI (ST elevation myocardial infarction).   s/p PCI w/ stent to proximal LAD via L radial access. s/p aspirin and brillinta load. TTE shows reduced EF to 30-35% post MI.   - c/w metoprolol tartrate 12.5 mg BID  - c/w entresto 24-26 mg BID  - c/w brilinta 90 mg BID  - c/w aspirin 81 mg QD  - c/w atorvastatin 80mg QD    #HTN (hypertension).   On home amlodipine 10mg QD. Normotensive on admission and post-cath.  -hold amlodipine for now and monitor pressures  - c/w metoprolol tartrate 12.5 mg BID  - c/w entresto 24-26 mg BID     Problem/Plan - 3:  ·  Problem: Heart palpitations.   ·  Plan: Pt endorses several-year hx of palpitations which occur randomly. Reports that she saw a cardiologist for evaluation, holter monitor read some "hiccups" but was never told she had an arrythmia. Was told it could be attributed to her anemia or SLE.   -daily EKGs  -ensure proper cardiology f/u.    #Hyperlipidemia.  Total cholesterol 198; HDL 56, . Goal LDL given CAD is LDL of 70.  - c/w atorvastatin 80 mg QHS.    #SLE (systemic lupus erythematosus).  Patient w/ SLE, on plaquenil 200mg BID   - continue plaquenil 200mg BID.    #Menorrhagia.  Pt reports heavy menstrual cycles, saturating 12-14 tampons in 24 hours. Says she has been evaluated by hematology in the past. She has a uterine fibroid and L ovary cyst.  - outpatient gyn follow-up       Patient was discharged to: home    New medications:   - c/w metoprolol tartrate 12.5 mg BID  - c/w entresto 24-26 mg BID  - c/w brilinta 90 mg BID  - c/w aspirin 81 mg QD  - c/w atorvastatin 80mg QD    Changes to old medications:    Medications that were stopped:  amlodipine 10 mg     Items to follow up as outpatient:  - Cardiology follow-up with:   Physical exam at the time of discharge:       #Discharge: do not delete    Ms Emma Whipple is a 44 y/o F w/ PMH  SLE (on plaquinel), HTN, anemia, heavy menstrual cycles (saturated 12-14 tampons per 24 hours), uterine fibroid, L ovary cyst, spinal stenosis (daily NSAID use) who presented with c/o mid epigastric pain/mid chest pain, generalized fatigue, and SOB for 3 days. Found to have STEMI s/p PCI with stent placed in pLAD due to 100% occlusion. Access site R radial artery, s/p removal of TR band. Started on ASA, Brilinta, statin. No immediate complications noted post procedure.  TTE post-cath showed an EF of 30-35%. Started metoprolol tartrate 25mg BID and entrestro 24-26 mg BID. Post-MI EKG on 2/5 showed recurrent of ST elevation in anterior leads which raised concern for aneurysm. Repeat TTE to evaluate for cardiac aneurysm showed severe hypokinesis. Started on triple therapy -- eliquis 5 mg BID for 30 days alongside ASA and brilinta.  OBGYN consulted regarding recommendations for contraception in light of her menorrhagia and prothrombotic risks from SLE given need for triple therapy, outpatient mirena IUD recommended.     TTE 2/3:   1. Moderately reduced left ventricular systolic function, LVEF 30-35%.   2. Multiple segmental abnormalities exist. See findings.   3. Grade I left ventricular diastolic dysfunction.   4. Normal right ventricular size and systolic function.   5. No significant valvular disease.   6. No evidence of pulmonary hypertension, pulmonary artery systolic pressure is 25 mmHg.   7. Trivial pericardial effusion.    TTE 2/5:   1. Limited study obtained for evaluation of left ventricular function performed by cardiology fellow on call.  2. Severe hypokinesis of the mid to apical septum. Dyskinetic apex. No LV thrombus.  3. The right ventricle is normal in size. Right ventricular systolic function is normal.  4. No pericardial effusion.    Hospital course (by problem):     #STEMI (ST elevation myocardial infarction).   s/p PCI w/ stent to proximal LAD via L radial access. s/p aspirin and brillinta load. TTE shows reduced EF to 30-35% with severe hypokinesis   - c/w metoprolol tartrate 25 mg BID  - c/w entresto 24-26 mg BID  - c/w brilinta 90 mg BID  - c/w aspirin 81 mg QD  - c/w eliquis 5 mg BID  - c/w atorvastatin 80mg QD    #HTN (hypertension).   On home amlodipine 10mg QD. Normotensive on admission and post-cath.  - hold amlodipine   - c/w metoprolol tartrate 12.5 mg BID  - c/w entresto 24-26 mg BID    #Heart palpitations.   Pt endorses several-year hx of palpitations which occur randomly. Reports that she saw a cardiologist for evaluation, holter monitor read some "hiccups" but was never told she had an arrythmia. Was told it could be attributed to her anemia or SLE.   - c/w metoprolol tartrate 25 mg BID  - c/w eliquis 5 mg BID    #Hyperlipidemia  Total cholesterol 198; HDL 56, . Goal LDL given CAD is LDL of 70.  - c/w atorvastatin 80 mg QHS.    #SLE (systemic lupus erythematosus).  Patient w/ SLE, on plaquenil 200mg BID   - continue plaquenil 200mg BID.    #Menorrhagia.  Pt reports heavy menstrual cycles, saturating 12-14 tampons in 24 hours. Says she has been evaluated by hematology in the past. She has a uterine fibroid and L ovary cyst.  - outpatient gyn follow-up     Patient was discharged to: home  New medications:   - c/w metoprolol tartrate 25 mg BID  - c/w entresto 24-26 mg BID  - c/w brilinta 90 mg BID  - c/w aspirin 81 mg QD  - c/w eliquis 5 mg BID  - c/w atorvastatin 80mg QD    Changes to old medications: none   Medications that were stopped:  amlodipine 10 mg     Items to follow up as outpatient:  - Cardiology follow-up with Dr. Montgomery on 2/14/24 at 2:40 pm     Physical exam at the time of discharge:  PHYSICAL EXAM:  GENERAL: NAD, pleasant and conversant   EYES: EOMI, conjunctiva and sclera clear  ENMT: Moist mucous membrane  NERVOUS SYSTEM:  Alert & Oriented X3, Good concentration  CHEST/LUNG: Clear to auscultation bilaterally; No rales, rhonchi, wheezing, or rubs  HEART: Regular rate and rhythm; No murmurs, rubs, or gallops  ABDOMEN: Soft, Nontender, Nondistended  EXTREMITIES:  2+ Peripheral Pulses, No clubbing, cyanosis, or edema    Dx: TREATMENT FOR STEMI or HEART FAILURE THIS ADMISSION: YES/NO            If Yes to STEMI or Heart Failure: 7 day Follow-Up Appointment Made: Yes           Cardiac Rehab Indications (STEMI/NSTEMI/ACS/Unstable Angina/CHF/Chronic Stable Angina/Heart Surgery (CABG,Valve)/Post PCI):            *Education on benefits of Cardiac Rehab provided to patient: Yes         *Referral and Prescription Given for Cardiac Rehab: Yes         *Pt given list of locations & instructed to contact their insurance company to review list of participating providers. Yes          *Pt instructed to bring Cardiac Rehab prescription with them to Cardiology Follow up appointment for assistance with enrollment: Yes

## 2024-02-03 NOTE — DISCHARGE NOTE PROVIDER - NSDCCPCAREPLAN_GEN_ALL_CORE_FT
PRINCIPAL DISCHARGE DIAGNOSIS  Diagnosis: NSTEMI (non-ST elevation myocardial infarction)  Assessment and Plan of Treatment: You underwent a cardiac catheterization on (DATE) and the blockages in your ____ Coronary Artery were opened with Angioplasty/stent placement.   NEVER MISS A DOSE OF ASPIRIN and PLAVIX/Brilinta/Effient; IF YOU DO, YOU ARE AT RISK OF YOUR ANGIOPLASTY SITE and/or STENT CLOSING AND HAVING A HEART ATTACK. DO NOT STOP THESE TWO MEDICATIONS UNLESS INSTRUCTED TO DO SO BY YOUR CARDIOLOGIST. Your procedure was done through your wrist/groin.  You may shower but Avoid tub baths, hot tubs or swimming for 5 days to prevent infection.  You do not need to keep this area covered. You should wait 3 days before returning to ordinary activities. Do not drive for 2 days. Do not lift more than 5 pounds with affected arm for 3 days or more than 10 pounds if groin access for 5 days.  If you develop any swelling, bleeding, hardening of the skin (hematoma formation), acute pain, numbness/tingling  in your arm/groin please contact your doctor immediately or call our 24/7 line: [phone] or go to nearest Emergency Room. Please return to the hospital/seek immediate medical attention if you have worsening symptoms of chest pain, shortness of breath.   Please follow up with your Cardiologist  ___in 1-2 weeks, call the office and make an appointment. All of your prescriptions have been sent electronically to your pharmacy.  Please continue a heart healthy Plant based Mediterranean Diet low in sodium, cholesterol, and fat.  You have been given a Stent Card to carry with you in your wallet.  Make Photocopies or take a picture of card so you have a backup copy in event the Original card is lost.  This card has important information for any possible future Radiology/MRI studies.    We have provided you with a prescription for cardiac rehab which is medically supervised exercise program for your heart and has been shown to improve the quantity and quality of life of people with heart disease like yours. You should attend cardiac rehab 3 times per week for 12 weeks. We have provided yo     PRINCIPAL DISCHARGE DIAGNOSIS  Diagnosis: STEMI (ST elevation myocardial infarction)  Assessment and Plan of Treatment: You underwent a cardiac catheterization on (DATE) and the blockages in your Left Anterior Descening Coronary Artery were opened with Angioplasty/stent placement.   NEVER MISS A DOSE OF ASPIRIN and PLAVIX/Brilinta/Effient; IF YOU DO, YOU ARE AT RISK OF YOUR ANGIOPLASTY SITE and/or STENT CLOSING AND HAVING A HEART ATTACK. DO NOT STOP THESE TWO MEDICATIONS UNLESS INSTRUCTED TO DO SO BY YOUR CARDIOLOGIST. Your procedure was done through your wrist/groin.  You may shower but Avoid tub baths, hot tubs or swimming for 5 days to prevent infection.  You do not need to keep this area covered. You should wait 3 days before returning to ordinary activities. Do not drive for 2 days. Do not lift more than 5 pounds with affected arm for 3 days or more than 10 pounds if groin access for 5 days.  If you develop any swelling, bleeding, hardening of the skin (hematoma formation), acute pain, numbness/tingling  in your arm/groin please contact your doctor immediately or call our 24/7 line: [phone] or go to nearest Emergency Room. Please return to the hospital/seek immediate medical attention if you have worsening symptoms of chest pain, shortness of breath.   Please follow up with your Cardiologist Dr. Montgomery in 1-2 weeks, call the office and make an appointment. All of your prescriptions have been sent electronically to your pharmacy.  Please continue a heart healthy Plant based Mediterranean Diet low in sodium, cholesterol, and fat.  You have been given a Stent Card to carry with you in your wallet.  Make Photocopies or take a picture of card so you have a backup copy in event the Original card is lost.  This card has important information for any possible future Radiology/MRI studies.    We have provided you with a prescription for cardiac rehab which is medically supervised exercise program for your heart and has been shown to improve the quantity and quality of life of people with heart disease like yours. You should attend cardiac rehab 3 times per week for 1      SECONDARY DISCHARGE DIAGNOSES  Diagnosis: Hyperlipidemia  Assessment and Plan of Treatment: Please continue taking atorvastatin 80 mg every night at bedtime. This medication alongside a heart healthy diet miryam help keep your bad choleseterol which can build up and cause plaques in your arteries in control.  In addition, we have provided you the detials for outpatient follow-up with our Nutrtion services. Please follow-up with them for more guidance on healthy diet and lifestyle choices.    Diagnosis: HTN (hypertension)  Assessment and Plan of Treatment: For your blood pressure control please continue taking lopressor 12.5 mg every 12 hours and entresto 24-25 mg every 12 hours.  STOP taking your home amlodipine     PRINCIPAL DISCHARGE DIAGNOSIS  Diagnosis: STEMI (ST elevation myocardial infarction)  Assessment and Plan of Treatment: You underwent a cardiac catheterization on 2/2/24 and the blockages in your Left Anterior Descening Coronary Artery were opened with Angioplasty/stent placement.   NEVER MISS A DOSE OF ELIQUIS, ASPIRIN and BRILINTA; IF YOU DO, YOU ARE AT RISK OF YOUR ANGIOPLASTY SITE and/or STENT CLOSING AND HAVING A HEART ATTACK. DO NOT STOP THESE TWO MEDICATIONS UNLESS INSTRUCTED TO DO SO BY YOUR CARDIOLOGIST. Your procedure was done through your wrist.  You may shower but Avoid tub baths, hot tubs or swimming for 5 days to prevent infection.  You do not need to keep this area covered. You should wait 3 days before returning to ordinary activities. Do not drive for 2 days. Do not lift more than 5 pounds with affected arm for 3 days or more than 10 pounds if groin access for 5 days.  If you develop any swelling, bleeding, hardening of the skin (hematoma formation), acute pain, numbness/tingling  in your arm/groin please contact your doctor immediately or call our 24/7 line: [phone] or go to nearest Emergency Room. Please return to the hospital/seek immediate medical attention if you have worsening symptoms of chest pain, shortness of breath.  All of your prescriptions have been sent electronically to your pharmacy.  You have been given a Stent Card to carry with you in your wallet.  Make Photocopies or take a picture of card so you have a backup copy in event the Original card is lost.  This card has important information for any possible future Radiology/MRI studies.    We have provided you with a prescription for cardiac rehab which is medically supervised exercise program for your heart and has been shown to improve the quantity and quality of life of people with heart disease like yours.   To do:   1. Take eliquis 5 mg every 12 hours for 1 month  2. Take aspirin 81 mg every day   3. Take brilinta 90 mg every 12 hours   4. Follow-up with Dr. Montgomery on 2/14 at 2:40 pm      SECONDARY DISCHARGE DIAGNOSES  Diagnosis: Hyperlipidemia  Assessment and Plan of Treatment: Please continue taking atorvastatin 80 mg every night at bedtime. This medication alongside a heart healthy diet miryam help keep your bad choleseterol which can build up and cause plaques in your arteries in control. Please continue a heart healthy Plant based Mediterranean Diet low in sodium, cholesterol, and fat.  In addition, we have provided you the detials for outpatient follow-up with our Nutrtion services. Please follow-up with them for more guidance on healthy diet and lifestyle choices.    Diagnosis: HTN (hypertension)  Assessment and Plan of Treatment: For your blood pressure control please continue taking lopressor 25 mg every 12 hours and entresto 24-25 mg every 12 hours.  STOP taking your home amlodipine    Diagnosis: Menorrhagia  Assessment and Plan of Treatment: You have heavy period for which we consulted OBGYN for contraception options given that we started you on some blood thinners. They recommend you have a MIRENA IUD placed.   - please follow-up with your outpatient gynecologist to explore this further.

## 2024-02-03 NOTE — DISCHARGE NOTE NURSING/CASE MANAGEMENT/SOCIAL WORK - NSDCFUADDAPPT_GEN_ALL_CORE_FT
Recommend to follow up with [facility] for continuity of care. Email [email or web address] or call [phone].

## 2024-02-03 NOTE — DISCHARGE NOTE PROVIDER - NSDCFUSCHEDAPPT_GEN_ALL_CORE_FT
[facility] Physician [facility]  [facility] [address]  Scheduled Appointment: 02/14/2024

## 2024-02-03 NOTE — PHYSICAL THERAPY INITIAL EVALUATION ADULT - ASSISTIVE DEVICE FOR TRANSFER: STAND/SIT, REHAB EVAL
Instructed patient/caregiver to follow-up with primary care physician./Instructed patient/caregiver regarding signs and symptoms of infection./Verbal/written post procedure instructions were given to patient/caregiver. straight cane

## 2024-02-03 NOTE — PROGRESS NOTE ADULT - ASSESSMENT
Ms Emma Whipple is a 44 y/o F w/ PMH  SLE (on plaquinel), HTN, anemia, heavy menstrual cycles (saturated 12-14 tampons per 24 hours), uterine fibroid, L ovary cyst, spinal stenosis (daily NSAID use) who presented with c/o mid epigastric pain/mid chest pain, generalized fatigue, and SOB for 3 days, was found to have STEMI and 100% occlusion of the proximal LAD, s/p cath lab and stent to prox LAD. Stepped down from CCU to telemetry for further monitoring. Pending PT eval.

## 2024-02-03 NOTE — DISCHARGE NOTE PROVIDER - CARE PROVIDER_API CALL
[facility]  Cardiology  [address] [place] [address]  Phone: [phone]  Fax: [phone]  Follow Up Time: 2 weeks   [facility]  Cardiology  [address] [place] [address]  Phone: [phone]  Fax: [phone]  Scheduled Appointment: 02/14/2024 02:40 PM

## 2024-02-03 NOTE — PROGRESS NOTE ADULT - PROBLEM SELECTOR PLAN 6
Pt reports heavy menstrual cycles, saturating 12-14 tampons in 24 hours. Says she has been evaluated by hematology in the past. She has a uterine fibroid and L ovary cyst.  -maintain active type and screen  -transfuse for Hg < 8

## 2024-02-03 NOTE — DISCHARGE NOTE PROVIDER - PROVIDER TOKENS
PROVIDER:[TOKEN:[[id number]],FOLLOWUP:[2 weeks]] PROVIDER:[TOKEN:[[id number]],SCHEDULEDAPPT:[02/14/2024],SCHEDULEDAPPTTIME:[02:40 PM]]

## 2024-02-03 NOTE — PHYSICAL THERAPY INITIAL EVALUATION ADULT - ADDITIONAL COMMENTS
Patient reports she lives with her two children ( 8 and 17) in elevator apartment with no DILLON. PTA patient ambulated with SC outdoors and no AD indoors. Patient was independent with all mobility and ADLs

## 2024-02-03 NOTE — PROGRESS NOTE ADULT - ATTENDING COMMENTS
Ms Emma Whipple is a 44 y/o F w/ PMH  SLE (on plaquinel), HTN, anemia, heavy menstrual cycles (saturated 12-14 tampons per 24 hours), uterine fibroid, L ovary cyst, spinal stenosis (daily NSAID use) who presented with c/o mid epigastric pain/mid chest pain, generalized fatigue, and SOB for 3 days, was found to have STEMI and 100% occlusion of the proximal LAD, s/p LI to prox LAD. Remains  free.       1. CAD, native heart, native vessel, without angina  2. STEMI< subsequent visit  3. LI LAD  Pain free   s/p PCI w/ stent to proximal LAD via L radial access. s/p aspirin and brillinta load.  - metoprolol tartrate 12.5 mg BID  -brilinta 90 mg BID  -aspirin 81 mg QD  -atorvastatin 80mg q24    EKG as noted w evolving changes of anterior MI". However EKG today shows recurrent of ST elevation in anterior leads: in absenc eof angina, doubt ischemia, concern re aneurysm Will repeat TTE    4, Acute systolic HF   Complete TTE showing EF 30-35%  Euvolemic  Continue GMT     5. Hyperlipidmeia, target LDL < 70  Total cholesterol 198; HDL 56, . Goal LDL given CAD is LDL of 70.  - c/w atorvastatin 80 mg QHS

## 2024-02-03 NOTE — DISCHARGE NOTE PROVIDER - ATTENDING DISCHARGE PHYSICAL EXAMINATION:
Ms Emma Whipple is a 42 y/o F w/ PMH  SLE (on plaquinel), HTN, anemia, heavy menstrual cycles (saturated 12-14 tampons per 24 hours), uterine fibroid, L ovary cyst, spinal stenosis (daily NSAID use) who presented with c/o mid epigastric pain/mid chest pain, generalized fatigue, and SOB for 3 days, was found to have STEMI and 100% occlusion of the proximal LAD, s/p LI to prox LAD. Remains  free.       1. CAD, native heart, native vessel, without angina  2. STEMI< subsequent visit  3. LI LAD  Now asymptomatic. s/p PCI w/ stent to proximal LAD via L radial access. s/p aspirin and brillinta load.  - metoprolol tartrate 12.5 mg BID  -brilinta 90 mg BID  -aspirin 81 mg QD  -atorvastatin 80mg q24    4. Acute systolic HF   Complete TTE showing EF 30-35%  Euvolemic  Continue GDMT     5. Hyperlipidemia, target LDL < 70  Total cholesterol 198; HDL 56, . Goal LDL given CAD is LDL of 70.  - c/w atorvastatin 80 mg QHS .    6. Apical dyskinesis  EKG changes from apical dyskinesis. Will continue GDMT.  Discussed risk of stroke and apical thrombus. Will proceed with triple therapy for 30 days, then repeat echo.  Gynecology to consider IUD as an outpatient.      Recheck in 30 days, if remains > 70, consider ezetimibe and repatha.    note written on 2/6 in regards to 2/5 care delivered.

## 2024-02-03 NOTE — DISCHARGE NOTE PROVIDER - NSDCFUADDAPPT_GEN_ALL_CORE_FT
Recommend to follow up with [facility] for continuity of care. Email [email or web address] or call [phone].

## 2024-02-04 LAB
ALBUMIN SERPL ELPH-MCNC: 3.8 G/DL — SIGNIFICANT CHANGE UP (ref 3.3–5)
ALP SERPL-CCNC: 70 U/L — SIGNIFICANT CHANGE UP (ref 40–120)
ALT FLD-CCNC: 27 U/L — SIGNIFICANT CHANGE UP (ref 10–45)
ANION GAP SERPL CALC-SCNC: 11 MMOL/L — SIGNIFICANT CHANGE UP (ref 5–17)
AST SERPL-CCNC: 97 U/L — HIGH (ref 10–40)
BILIRUB SERPL-MCNC: 0.4 MG/DL — SIGNIFICANT CHANGE UP (ref 0.2–1.2)
BUN SERPL-MCNC: 11 MG/DL — SIGNIFICANT CHANGE UP (ref 7–23)
CALCIUM SERPL-MCNC: 9.1 MG/DL — SIGNIFICANT CHANGE UP (ref 8.4–10.5)
CHLORIDE SERPL-SCNC: 102 MMOL/L — SIGNIFICANT CHANGE UP (ref 96–108)
CO2 SERPL-SCNC: 22 MMOL/L — SIGNIFICANT CHANGE UP (ref 22–31)
CREAT SERPL-MCNC: 0.85 MG/DL — SIGNIFICANT CHANGE UP (ref 0.5–1.3)
EGFR: 88 ML/MIN/1.73M2 — SIGNIFICANT CHANGE UP
GLUCOSE SERPL-MCNC: 138 MG/DL — HIGH (ref 70–99)
HCT VFR BLD CALC: 34.9 % — SIGNIFICANT CHANGE UP (ref 34.5–45)
HGB BLD-MCNC: 10.5 G/DL — LOW (ref 11.5–15.5)
MAGNESIUM SERPL-MCNC: 1.8 MG/DL — SIGNIFICANT CHANGE UP (ref 1.6–2.6)
MCHC RBC-ENTMCNC: 21.3 PG — LOW (ref 27–34)
MCHC RBC-ENTMCNC: 30.1 GM/DL — LOW (ref 32–36)
MCV RBC AUTO: 70.8 FL — LOW (ref 80–100)
NRBC # BLD: 0 /100 WBCS — SIGNIFICANT CHANGE UP (ref 0–0)
PHOSPHATE SERPL-MCNC: 3.1 MG/DL — SIGNIFICANT CHANGE UP (ref 2.5–4.5)
PLATELET # BLD AUTO: 308 K/UL — SIGNIFICANT CHANGE UP (ref 150–400)
POTASSIUM SERPL-MCNC: 4.1 MMOL/L — SIGNIFICANT CHANGE UP (ref 3.5–5.3)
POTASSIUM SERPL-SCNC: 4.1 MMOL/L — SIGNIFICANT CHANGE UP (ref 3.5–5.3)
PROT SERPL-MCNC: 7.9 G/DL — SIGNIFICANT CHANGE UP (ref 6–8.3)
RBC # BLD: 4.93 M/UL — SIGNIFICANT CHANGE UP (ref 3.8–5.2)
RBC # FLD: 14.3 % — SIGNIFICANT CHANGE UP (ref 10.3–14.5)
SODIUM SERPL-SCNC: 135 MMOL/L — SIGNIFICANT CHANGE UP (ref 135–145)
WBC # BLD: 4.97 K/UL — SIGNIFICANT CHANGE UP (ref 3.8–10.5)
WBC # FLD AUTO: 4.97 K/UL — SIGNIFICANT CHANGE UP (ref 3.8–10.5)

## 2024-02-04 PROCEDURE — 93010 ELECTROCARDIOGRAM REPORT: CPT

## 2024-02-04 RX ORDER — MAGNESIUM SULFATE 500 MG/ML
2 VIAL (ML) INJECTION ONCE
Refills: 0 | Status: COMPLETED | OUTPATIENT
Start: 2024-02-04 | End: 2024-02-04

## 2024-02-04 RX ORDER — ACETAMINOPHEN 500 MG
650 TABLET ORAL EVERY 6 HOURS
Refills: 0 | Status: DISCONTINUED | OUTPATIENT
Start: 2024-02-04 | End: 2024-02-05

## 2024-02-04 RX ORDER — METOPROLOL TARTRATE 50 MG
25 TABLET ORAL EVERY 12 HOURS
Refills: 0 | Status: DISCONTINUED | OUTPATIENT
Start: 2024-02-04 | End: 2024-02-05

## 2024-02-04 RX ORDER — METOPROLOL TARTRATE 50 MG
12.5 TABLET ORAL ONCE
Refills: 0 | Status: COMPLETED | OUTPATIENT
Start: 2024-02-04 | End: 2024-02-04

## 2024-02-04 RX ADMIN — Medication 200 MILLIGRAM(S): at 09:54

## 2024-02-04 RX ADMIN — ATORVASTATIN CALCIUM 80 MILLIGRAM(S): 80 TABLET, FILM COATED ORAL at 22:15

## 2024-02-04 RX ADMIN — SIMETHICONE 80 MILLIGRAM(S): 80 TABLET, CHEWABLE ORAL at 21:23

## 2024-02-04 RX ADMIN — Medication 650 MILLIGRAM(S): at 18:27

## 2024-02-04 RX ADMIN — TICAGRELOR 90 MILLIGRAM(S): 90 TABLET ORAL at 15:50

## 2024-02-04 RX ADMIN — SACUBITRIL AND VALSARTAN 1 TABLET(S): 24; 26 TABLET, FILM COATED ORAL at 18:15

## 2024-02-04 RX ADMIN — Medication 25 MILLIGRAM(S): at 18:15

## 2024-02-04 RX ADMIN — Medication 25 GRAM(S): at 09:54

## 2024-02-04 RX ADMIN — CHLORHEXIDINE GLUCONATE 1 APPLICATION(S): 213 SOLUTION TOPICAL at 07:23

## 2024-02-04 RX ADMIN — SACUBITRIL AND VALSARTAN 1 TABLET(S): 24; 26 TABLET, FILM COATED ORAL at 05:23

## 2024-02-04 RX ADMIN — Medication 12.5 MILLIGRAM(S): at 12:16

## 2024-02-04 RX ADMIN — Medication 12.5 MILLIGRAM(S): at 05:23

## 2024-02-04 RX ADMIN — Medication 81 MILLIGRAM(S): at 22:15

## 2024-02-04 RX ADMIN — Medication 200 MILLIGRAM(S): at 22:15

## 2024-02-04 RX ADMIN — TICAGRELOR 90 MILLIGRAM(S): 90 TABLET ORAL at 05:23

## 2024-02-04 RX ADMIN — SIMETHICONE 80 MILLIGRAM(S): 80 TABLET, CHEWABLE ORAL at 09:56

## 2024-02-04 NOTE — PROGRESS NOTE ADULT - PROBLEM SELECTOR PLAN 1
s/p PCI w/ stent to proximal LAD via L radial access. s/p aspirin and brillinta load. Complete TTE showing EF 30-35%  - metoprolol tartrate 12.5 mg BID increased to lopressor 25 BID today due to symptomatic PVCs  -brilinta 90 mg BID  -aspirin 81 mg QD  -atorvastatin 80mg q24  -f/u complete TTE to r/o apical aneurysm  -f/u daily EKGs  -arrange for close cardiology f/u s/p PCI w/ stent to proximal LAD via L radial access. s/p aspirin and brillinta load. Complete TTE showing EF 30-35%  - metoprolol tartrate 12.5 mg BID increased to lopressor 25 BID today due to symptomatic PVCs  -brilinta 90 mg BID  -aspirin 81 mg QD  -atorvastatin 80mg q24  -f/u complete TTE to r/o apical aneurysm as EKG with persistent DILLON post cardiac catheterization. No associated chest pain  -f/u daily EKGs  -arrange for close cardiology f/u

## 2024-02-04 NOTE — PROGRESS NOTE ADULT - SUBJECTIVE AND OBJECTIVE BOX
OVERNIGHT EVENTS: Reported a few episodes of palpitations overnight, lasting 1-2 seconds. On review of tele monitoring, pt found to have PVCs    SUBJECTIVE:  Patient seen and examined at bedside. States she feels well, no chest pain, shortness of breath. States since this morning, she has not had further episodes of palpitations. No abdominal pain, n/v/d, dizziness.    Vital Signs Last 12 Hrs  T(F): 98.5 (02-04-24 @ 09:58), Max: 99.1 (02-04-24 @ 06:20)  HR: 83 (02-04-24 @ 08:31) (75 - 89)  BP: 97/62 (02-04-24 @ 08:31) (97/62 - 104/59)  BP(mean): 74 (02-04-24 @ 08:31) (72 - 78)  RR: 17 (02-04-24 @ 08:31) (17 - 18)  SpO2: 100% (02-04-24 @ 08:31) (100% - 100%)  I&O's Summary    03 Feb 2024 07:01  -  04 Feb 2024 07:00  --------------------------------------------------------  IN: 725 mL / OUT: 0 mL / NET: 725 mL        PHYSICAL EXAM:  Constitutional: NAD, comfortable in bed, speaking in full sentences  HEENT: NC/AT, MMM  Neck: Supple, no JVD  Respiratory: CTA B/L. No w/r/r.   Cardiovascular: RRR, normal S1 and S2, no m/r/g.   Gastrointestinal: +BS, soft NTND, n  Extremities: wwp; no edema.   Vascular: Pulses equal and strong throughout.   Neurological: AAOx3      LABS:                        10.5   4.97  )-----------( 308      ( 04 Feb 2024 06:48 )             34.9     02-04    135  |  102  |  11  ----------------------------<  138<H>  4.1   |  22  |  0.85    Ca    9.1      04 Feb 2024 06:48  Phos  3.1     02-04  Mg     1.8     02-04    TPro  7.9  /  Alb  3.8  /  TBili  0.4  /  DBili  x   /  AST  97<H>  /  ALT  27  /  AlkPhos  70  02-04      Urinalysis Basic - ( 04 Feb 2024 06:48 )    Color: x / Appearance: x / SG: x / pH: x  Gluc: 138 mg/dL / Ketone: x  / Bili: x / Urobili: x   Blood: x / Protein: x / Nitrite: x   Leuk Esterase: x / RBC: x / WBC x   Sq Epi: x / Non Sq Epi: x / Bacteria: x          RADIOLOGY & ADDITIONAL TESTS:    MEDICATIONS  (STANDING):  aspirin enteric coated 81 milliGRAM(s) Oral every 24 hours  atorvastatin 80 milliGRAM(s) Oral at bedtime  chlorhexidine 2% Cloths 1 Application(s) Topical <User Schedule>  hydroxychloroquine 200 milliGRAM(s) Oral every 12 hours  metoprolol tartrate 12.5 milliGRAM(s) Oral once  metoprolol tartrate 25 milliGRAM(s) Oral every 12 hours  sacubitril 24 mG/valsartan 26 mG 1 Tablet(s) Oral two times a day  ticagrelor 90 milliGRAM(s) Oral every 12 hours    MEDICATIONS  (PRN):  simethicone 80 milliGRAM(s) Chew every 12 hours PRN Gas

## 2024-02-04 NOTE — PROGRESS NOTE ADULT - ASSESSMENT
Ms Emma Whipple is a 44 y/o F w/ PMH  SLE (on plaquinel), HTN, anemia, heavy menstrual cycles (saturated 12-14 tampons per 24 hours), uterine fibroid, L ovary cyst, spinal stenosis (daily NSAID use) who presented with c/o mid epigastric pain/mid chest pain, generalized fatigue, and SOB for 3 days, was found to have STEMI and 100% occlusion of the proximal LAD, s/p cath lab and stent to prox LAD. Stepped down from CCU to telemetry for further monitoring and optimization.

## 2024-02-05 VITALS — TEMPERATURE: 98 F

## 2024-02-05 PROBLEM — D64.9 ANEMIA, UNSPECIFIED: Chronic | Status: ACTIVE | Noted: 2024-02-02

## 2024-02-05 PROBLEM — N92.0 EXCESSIVE AND FREQUENT MENSTRUATION WITH REGULAR CYCLE: Chronic | Status: ACTIVE | Noted: 2024-02-02

## 2024-02-05 PROBLEM — I10 ESSENTIAL (PRIMARY) HYPERTENSION: Chronic | Status: ACTIVE | Noted: 2024-02-02

## 2024-02-05 PROBLEM — M48.00 SPINAL STENOSIS, SITE UNSPECIFIED: Chronic | Status: ACTIVE | Noted: 2024-02-02

## 2024-02-05 PROBLEM — M32.9 SYSTEMIC LUPUS ERYTHEMATOSUS, UNSPECIFIED: Chronic | Status: ACTIVE | Noted: 2024-02-02

## 2024-02-05 PROBLEM — R00.2 PALPITATIONS: Chronic | Status: ACTIVE | Noted: 2024-02-02

## 2024-02-05 LAB
ALBUMIN SERPL ELPH-MCNC: 4.2 G/DL — SIGNIFICANT CHANGE UP (ref 3.3–5)
ALP SERPL-CCNC: 74 U/L — SIGNIFICANT CHANGE UP (ref 40–120)
ALT FLD-CCNC: 22 U/L — SIGNIFICANT CHANGE UP (ref 10–45)
ANION GAP SERPL CALC-SCNC: 9 MMOL/L — SIGNIFICANT CHANGE UP (ref 5–17)
AST SERPL-CCNC: 54 U/L — HIGH (ref 10–40)
BILIRUB SERPL-MCNC: 0.3 MG/DL — SIGNIFICANT CHANGE UP (ref 0.2–1.2)
BLD GP AB SCN SERPL QL: NEGATIVE — SIGNIFICANT CHANGE UP
BUN SERPL-MCNC: 14 MG/DL — SIGNIFICANT CHANGE UP (ref 7–23)
CALCIUM SERPL-MCNC: 9.4 MG/DL — SIGNIFICANT CHANGE UP (ref 8.4–10.5)
CHLORIDE SERPL-SCNC: 102 MMOL/L — SIGNIFICANT CHANGE UP (ref 96–108)
CO2 SERPL-SCNC: 22 MMOL/L — SIGNIFICANT CHANGE UP (ref 22–31)
CREAT SERPL-MCNC: 0.84 MG/DL — SIGNIFICANT CHANGE UP (ref 0.5–1.3)
EGFR: 89 ML/MIN/1.73M2 — SIGNIFICANT CHANGE UP
GLUCOSE SERPL-MCNC: 164 MG/DL — HIGH (ref 70–99)
HCT VFR BLD CALC: 34.9 % — SIGNIFICANT CHANGE UP (ref 34.5–45)
HGB BLD-MCNC: 10.9 G/DL — LOW (ref 11.5–15.5)
LIDOCAIN SERPL-MCNC: 228 NMOL/L — HIGH
MAGNESIUM SERPL-MCNC: 1.9 MG/DL — SIGNIFICANT CHANGE UP (ref 1.6–2.6)
MCHC RBC-ENTMCNC: 21.9 PG — LOW (ref 27–34)
MCHC RBC-ENTMCNC: 31.2 GM/DL — LOW (ref 32–36)
MCV RBC AUTO: 70.2 FL — LOW (ref 80–100)
NRBC # BLD: 0 /100 WBCS — SIGNIFICANT CHANGE UP (ref 0–0)
PHOSPHATE SERPL-MCNC: 3.4 MG/DL — SIGNIFICANT CHANGE UP (ref 2.5–4.5)
PLATELET # BLD AUTO: 311 K/UL — SIGNIFICANT CHANGE UP (ref 150–400)
POTASSIUM SERPL-MCNC: 4 MMOL/L — SIGNIFICANT CHANGE UP (ref 3.5–5.3)
POTASSIUM SERPL-SCNC: 4 MMOL/L — SIGNIFICANT CHANGE UP (ref 3.5–5.3)
PROT SERPL-MCNC: 8.5 G/DL — HIGH (ref 6–8.3)
RBC # BLD: 4.97 M/UL — SIGNIFICANT CHANGE UP (ref 3.8–5.2)
RBC # FLD: 14.1 % — SIGNIFICANT CHANGE UP (ref 10.3–14.5)
RH IG SCN BLD-IMP: POSITIVE — SIGNIFICANT CHANGE UP
SODIUM SERPL-SCNC: 133 MMOL/L — LOW (ref 135–145)
WBC # BLD: 4 K/UL — SIGNIFICANT CHANGE UP (ref 3.8–10.5)
WBC # FLD AUTO: 4 K/UL — SIGNIFICANT CHANGE UP (ref 3.8–10.5)

## 2024-02-05 PROCEDURE — C1769: CPT

## 2024-02-05 PROCEDURE — 71045 X-RAY EXAM CHEST 1 VIEW: CPT

## 2024-02-05 PROCEDURE — 86901 BLOOD TYPING SEROLOGIC RH(D): CPT

## 2024-02-05 PROCEDURE — 85025 COMPLETE CBC W/AUTO DIFF WBC: CPT

## 2024-02-05 PROCEDURE — 93005 ELECTROCARDIOGRAM TRACING: CPT

## 2024-02-05 PROCEDURE — G1004: CPT

## 2024-02-05 PROCEDURE — 85347 COAGULATION TIME ACTIVATED: CPT

## 2024-02-05 PROCEDURE — C1753: CPT

## 2024-02-05 PROCEDURE — 85730 THROMBOPLASTIN TIME PARTIAL: CPT

## 2024-02-05 PROCEDURE — 86850 RBC ANTIBODY SCREEN: CPT

## 2024-02-05 PROCEDURE — 93308 TTE F-UP OR LMTD: CPT | Mod: 26

## 2024-02-05 PROCEDURE — 96375 TX/PRO/DX INJ NEW DRUG ADDON: CPT

## 2024-02-05 PROCEDURE — 80061 LIPID PANEL: CPT

## 2024-02-05 PROCEDURE — 96374 THER/PROPH/DIAG INJ IV PUSH: CPT

## 2024-02-05 PROCEDURE — 83036 HEMOGLOBIN GLYCOSYLATED A1C: CPT

## 2024-02-05 PROCEDURE — 84702 CHORIONIC GONADOTROPIN TEST: CPT

## 2024-02-05 PROCEDURE — 87637 SARSCOV2&INF A&B&RSV AMP PRB: CPT

## 2024-02-05 PROCEDURE — C1894: CPT

## 2024-02-05 PROCEDURE — 74177 CT ABD & PELVIS W/CONTRAST: CPT | Mod: MG

## 2024-02-05 PROCEDURE — 83690 ASSAY OF LIPASE: CPT

## 2024-02-05 PROCEDURE — 93321 DOPPLER ECHO F-UP/LMTD STD: CPT

## 2024-02-05 PROCEDURE — 85027 COMPLETE CBC AUTOMATED: CPT

## 2024-02-05 PROCEDURE — C1887: CPT

## 2024-02-05 PROCEDURE — 80048 BASIC METABOLIC PNL TOTAL CA: CPT

## 2024-02-05 PROCEDURE — 93307 TTE W/O DOPPLER COMPLETE: CPT

## 2024-02-05 PROCEDURE — C1874: CPT

## 2024-02-05 PROCEDURE — 36415 COLL VENOUS BLD VENIPUNCTURE: CPT

## 2024-02-05 PROCEDURE — 93010 ELECTROCARDIOGRAM REPORT: CPT

## 2024-02-05 PROCEDURE — 99285 EMERGENCY DEPT VISIT HI MDM: CPT

## 2024-02-05 PROCEDURE — 82553 CREATINE MB FRACTION: CPT

## 2024-02-05 PROCEDURE — 85610 PROTHROMBIN TIME: CPT

## 2024-02-05 PROCEDURE — 71275 CT ANGIOGRAPHY CHEST: CPT | Mod: MG

## 2024-02-05 PROCEDURE — 84100 ASSAY OF PHOSPHORUS: CPT

## 2024-02-05 PROCEDURE — 96376 TX/PRO/DX INJ SAME DRUG ADON: CPT

## 2024-02-05 PROCEDURE — 84484 ASSAY OF TROPONIN QUANT: CPT

## 2024-02-05 PROCEDURE — C1725: CPT

## 2024-02-05 PROCEDURE — 99239 HOSP IP/OBS DSCHRG MGMT >30: CPT | Mod: GC

## 2024-02-05 PROCEDURE — 80053 COMPREHEN METABOLIC PANEL: CPT

## 2024-02-05 PROCEDURE — 86900 BLOOD TYPING SEROLOGIC ABO: CPT

## 2024-02-05 PROCEDURE — 83695 ASSAY OF LIPOPROTEIN(A): CPT

## 2024-02-05 PROCEDURE — 83735 ASSAY OF MAGNESIUM: CPT

## 2024-02-05 PROCEDURE — 82550 ASSAY OF CK (CPK): CPT

## 2024-02-05 PROCEDURE — 83605 ASSAY OF LACTIC ACID: CPT

## 2024-02-05 PROCEDURE — C8929: CPT

## 2024-02-05 RX ORDER — APIXABAN 2.5 MG/1
1 TABLET, FILM COATED ORAL
Qty: 60 | Refills: 1
Start: 2024-02-05 | End: 2024-04-04

## 2024-02-05 RX ORDER — ASPIRIN/CALCIUM CARB/MAGNESIUM 324 MG
1 TABLET ORAL
Qty: 30 | Refills: 3
Start: 2024-02-05 | End: 2024-06-03

## 2024-02-05 RX ORDER — ATORVASTATIN CALCIUM 80 MG/1
1 TABLET, FILM COATED ORAL
Qty: 30 | Refills: 3
Start: 2024-02-05 | End: 2024-06-03

## 2024-02-05 RX ORDER — APIXABAN 2.5 MG/1
1 TABLET, FILM COATED ORAL
Qty: 60 | Refills: 0
Start: 2024-02-05 | End: 2024-03-05

## 2024-02-05 RX ORDER — APIXABAN 2.5 MG/1
5 TABLET, FILM COATED ORAL EVERY 12 HOURS
Refills: 0 | Status: DISCONTINUED | OUTPATIENT
Start: 2024-02-05 | End: 2024-02-05

## 2024-02-05 RX ORDER — TICAGRELOR 90 MG/1
1 TABLET ORAL
Qty: 60 | Refills: 3
Start: 2024-02-05 | End: 2024-06-03

## 2024-02-05 RX ORDER — AMLODIPINE BESYLATE 2.5 MG/1
1 TABLET ORAL
Refills: 0 | DISCHARGE

## 2024-02-05 RX ORDER — SACUBITRIL AND VALSARTAN 24; 26 MG/1; MG/1
1 TABLET, FILM COATED ORAL
Qty: 60 | Refills: 3
Start: 2024-02-05 | End: 2024-06-03

## 2024-02-05 RX ORDER — MAGNESIUM SULFATE 500 MG/ML
2 VIAL (ML) INJECTION ONCE
Refills: 0 | Status: COMPLETED | OUTPATIENT
Start: 2024-02-05 | End: 2024-02-05

## 2024-02-05 RX ORDER — METOPROLOL TARTRATE 50 MG
1 TABLET ORAL
Qty: 60 | Refills: 3
Start: 2024-02-05 | End: 2024-06-03

## 2024-02-05 RX ADMIN — Medication 25 GRAM(S): at 08:08

## 2024-02-05 RX ADMIN — TICAGRELOR 90 MILLIGRAM(S): 90 TABLET ORAL at 03:33

## 2024-02-05 RX ADMIN — SACUBITRIL AND VALSARTAN 1 TABLET(S): 24; 26 TABLET, FILM COATED ORAL at 05:53

## 2024-02-05 RX ADMIN — Medication 25 MILLIGRAM(S): at 05:53

## 2024-02-05 RX ADMIN — Medication 200 MILLIGRAM(S): at 10:12

## 2024-02-05 NOTE — PROGRESS NOTE ADULT - PROBLEM SELECTOR PLAN 8
F: none  E: K > 4; Mg > 2  D: DASH  DVT Prophylaxis: SCDs for now   GI ppx: none   Dispo: 5 lach

## 2024-02-05 NOTE — PROGRESS NOTE ADULT - PROBLEM SELECTOR PLAN 3
Patient history of uterine fibroids and heavy menses 12 pads a day   Hgb Stable 10.0 Monitor CBC and transfuse if needed
Pt endorses several-year hx of palpitations which occur randomly. Reports that she saw a cardiologist for evaluation, holter monitor read some "hiccups" but was never told she had an arrythmia. Was told it could be attributed to her anemia or SLE. Suspect to be 2/2 PVCs  -daily EKGs  -ensure proper cardiology f/u
Pt endorses several-year hx of palpitations which occur randomly. Reports that she saw a cardiologist for evaluation, holter monitor read some "hiccups" but was never told she had an arrythmia. Was told it could be attributed to her anemia or SLE.   -daily EKGs  -ensure proper cardiology f/u
Pt endorses several-year hx of palpitations which occur randomly. Reports that she saw a cardiologist for evaluation, holter monitor read some "hiccups" but was never told she had an arrythmia. Was told it could be attributed to her anemia or SLE.   -daily EKGs  -ensure proper cardiology f/u
Pt endorses several-year hx of palpitations which occur randomly. Reports that she saw a cardiologist for evaluation, holter monitor read some "hiccups" but was never told she had an arrythmia. Was told it could be attributed to her anemia or SLE. Suspect to be 2/2 PVCs  -daily EKGs  - c/w lopressor 25 BID   -ensure proper cardiology f/u

## 2024-02-05 NOTE — PROGRESS NOTE ADULT - SUBJECTIVE AND OBJECTIVE BOX
KARLA JACKSON  43y  Female      Patient is a 43y old  Female who presents with a chief complaint of NSTEMI(NON-ST ELEVATIONMYOCARDIAL INFARCTION)     (02 Feb 2024 18:01)      INTERVAL HPI/OVERNIGHT EVENTS: ULICES overnight. This morning, reports reduction in palpations and SOB. Denies CP, N/V/D/C or abdominal pain. Endorses having swollen uvula.  Denies cough or sore throat.       Vital Signs Last 24 Hrs  T(C): 36.8 (05 Feb 2024 05:50), Max: 37.2 (04 Feb 2024 21:53)  T(F): 98.2 (05 Feb 2024 05:50), Max: 98.9 (04 Feb 2024 21:53)  HR: 72 (05 Feb 2024 08:35) (72 - 93)  BP: 114/64 (05 Feb 2024 08:35) (110/67 - 130/72)  BP(mean): 83 (05 Feb 2024 08:35) (82 - 96)  RR: 18 (05 Feb 2024 08:35) (17 - 18)  SpO2: 96% (05 Feb 2024 08:35) (95% - 100%)    Parameters below as of 05 Feb 2024 08:35  Patient On (Oxygen Delivery Method): room air    PHYSICAL EXAM:  GENERAL: NAD, pleasant and conversant   EYES: EOMI, conjunctiva and sclera clear  ENMT: Moist mucous membrane  NERVOUS SYSTEM:  Alert & Oriented X3, Good concentration  CHEST/LUNG: Clear to auscultation bilaterally; No rales, rhonchi, wheezing, or rubs  HEART: Regular rate and rhythm; No murmurs, rubs, or gallops  ABDOMEN: Soft, Nontender, Nondistended  EXTREMITIES:  2+ Peripheral Pulses, No clubbing, cyanosis, or edema    Consultant(s) Notes Reviewed:  [x ] YES  [ ] NO  Care Discussed with Consultants/Other Providers [ x] YES  [ ] NO    LABS:                        10.9   4.00  )-----------( 311      ( 05 Feb 2024 05:30 )             34.9     02-05    133<L>  |  102  |  14  ----------------------------<  164<H>  4.0   |  22  |  0.84    Ca    9.4      05 Feb 2024 05:30  Phos  3.4     02-05  Mg     1.9     02-05    TPro  8.5<H>  /  Alb  4.2  /  TBili  0.3  /  DBili  x   /  AST  54<H>  /  ALT  22  /  AlkPhos  74  02-05      Urinalysis Basic - ( 05 Feb 2024 05:30 )    Color: x / Appearance: x / SG: x / pH: x  Gluc: 164 mg/dL / Ketone: x  / Bili: x / Urobili: x   Blood: x / Protein: x / Nitrite: x   Leuk Esterase: x / RBC: x / WBC x   Sq Epi: x / Non Sq Epi: x / Bacteria: x        CAPILLARY BLOOD GLUCOSE          RADIOLOGY & ADDITIONAL TESTS:    Imaging Personally Reviewed:  [ ] YES  [ ] NO

## 2024-02-05 NOTE — PROGRESS NOTE ADULT - PROBLEM SELECTOR PLAN 2
On home amlodipine 10mg q24. Normotensive on admission and post-cath.  - discontinuing home amlodipine for now and monitor pressures  - c/w entresto 24-26 mg BID
Holding any Hypertensives  On home amlodipine 10mg q24.  -hold amlodipine for now and monitor pressures  -restart amlodipine vs. start ACE/ARB as appropriate
On home amlodipine 10mg q24. Normotensive on admission and post-cath.  - discontinuing home amlodipine for now and monitor pressures  - c/w entresto 24-26 mg BID
On home amlodipine 10mg q24. Normotensive on admission and post-cath.  - discontinuing home amlodipine for now and monitor pressures  - c/w entresto 24-26 mg BID
On home amlodipine 10mg q24. Normotensive on admission and post-cath.  -hold amlodipine for now and monitor pressures  -restart amlodipine vs. start ACE/ARB as appropriate

## 2024-02-05 NOTE — PROGRESS NOTE ADULT - PROBLEM SELECTOR PLAN 1
s/p PCI w/ stent to proximal LAD via L radial access. s/p aspirin and brillinta load. Complete TTE showing EF 30-35%  - c/w lopressor 25 BID   -brilinta 90 mg BID  -aspirin 81 mg QD  -atorvastatin 80mg q24  -f/u complete TTE to r/o apical aneurysm. No associated chest pain  -f/u daily EKGs  - f/up GYN consult for contraceptive options given hx of SLE and need for AC in setting of possible cardiac aneurysm   -arrange for close cardiology f/u

## 2024-02-05 NOTE — PROGRESS NOTE ADULT - ASSESSMENT
Ms Emma Whipple is a 42 y/o F w/ PMH  SLE (on plaquinel), HTN, anemia, heavy menstrual cycles (saturated 12-14 tampons per 24 hours), uterine fibroid, L ovary cyst, spinal stenosis (daily NSAID use) who presented with c/o mid epigastric pain/mid chest pain, generalized fatigue, and SOB for 3 days, was found to have STEMI and 100% occlusion of the proximal LAD, s/p cath lab and stent to prox LAD.  Pending echo today to further evaluate cardiac function post MI.

## 2024-02-05 NOTE — CONSULT NOTE ADULT - SUBJECTIVE AND OBJECTIVE BOX
43y  LMP  with PMH SLE, HTN, HLD, and AUB-L presenting with STEMI, admitted to cardiology s/p LAD stent placement on . GYN consulted for recommendations for AUB in the setting of anticoagulation therapy for STEMI.  Patient is overall feeling well today. No CP, SOB, abd pain.  LMP . She reports the first 3 days of her period are very heavy, where she has to change a tampon per hour. Her menses occur every 27 days, lasting 7 days, with the heaviest the first three days. This has been occurring "forever", with no recent changes in menstrual pattern. Denies intermenstrual bleeding. She has never tried any medications such as OCPs or other contraceptives to reduce the flow. She denies requiring a blood transfusion, although has received IV iron in the past.  She was first diagnosed with fibroids in  during an ultrasound in her pregnancy. She does not believe her menstrual cycles have changes or worsened since then.  She sees Dr. Russo at [facility], last appointment 2024. She was able to show me a TVUS from 2024 which noted 11 x 5 x 5 cm uterus with 2x2 cm intramural fibroid as well as solid left ovarian cyst. After this ultrasound, she was referred to a GYN surgeon, with whom she has an appointment in March. She is not sure what follow up is recommended for the cyst.  Currently on a lighter flow of her cycle. Denies dizziness, lightheadedness, SOB.    OB H/x: VToP D&C x2, SAB, pC/S  (NRFHT i/s/o/ oligo/ ?IUGR at 30 weeks), rC/S  (term)  GYN H/x: fibroids and ovarian cyst as above. Abnormal pap with normal colpo. Denies STIs    MED H/x: SLE, HTN, HLD, DAVID  SURG H/x: C/S x2, R ganglion cyst removal  Medications: plaquenil 200 mg BID, amlodipine 10 mg qd, PO iron, ASA  Allergies: NKDA    Vital Signs Last 24 Hrs  T(C): 36.9 (2024 10:00), Max: 37.2 (2024 21:53)  T(F): 98.5 (2024 10:00), Max: 98.9 (2024 21:53)  HR: 81 (2024 12:22) (72 - 93)  BP: 122/73 (2024 12:22) (110/67 - 130/72)  BP(mean): 92 (2024 12:22) (82 - 92)  RR: 18 (2024 12:22) (17 - 18)  SpO2: 96% (2024 12:22) (95% - 96%)    Parameters below as of 2024 12:22  Patient On (Oxygen Delivery Method): room air        Physical Exam:  Gen: NAD, comfortable  GI: soft, nontender, nondistended, no rebound no guarding, no pelvic pathology palpated, although would be difficult 2/2 body habitus  BME/ Spec: patient declined  Ext: no edema, erythema, tenderness     LABS:                        10.9   4.00  )-----------( 311      ( 2024 05:30 )             34.9     02-05    133<L>  |  102  |  14  ----------------------------<  164<H>  4.0   |  22  |  0.84    Ca    9.4      2024 05:30  Phos  3.4     02-05  Mg     1.9     02-05    TPro  8.5<H>  /  Alb  4.2  /  TBili  0.3  /  DBili  x   /  AST  54<H>  /  ALT  22  /  AlkPhos  74  02-05      Urinalysis Basic - ( 2024 05:30 )    Color: x / Appearance: x / SG: x / pH: x  Gluc: 164 mg/dL / Ketone: x  / Bili: x / Urobili: x   Blood: x / Protein: x / Nitrite: x   Leuk Esterase: x / RBC: x / WBC x   Sq Epi: x / Non Sq Epi: x / Bacteria: x        RADIOLOGY & ADDITIONAL STUDIES:     43y  LMP  with PMH SLE, HTN, HLD, and AUB-L presenting with STEMI, admitted to cardiology s/p LAD stent placement on . GYN consulted for recommendations for AUB in the setting of anticoagulation therapy for STEMI.  Patient is overall feeling well today. No CP, SOB, abd pain.  LMP . She reports the first 3 days of her period are very heavy, where she has to change a tampon per hour. Her menses occur every 27 days, lasting 7 days, with the heaviest the first three days. This has been occurring "forever", with no recent changes in menstrual pattern. Denies intermenstrual bleeding. She has never tried any medications such as OCPs or other contraceptives to reduce the flow. She denies requiring a blood transfusion, although has received IV iron in the past.  She was first diagnosed with fibroids in  during an ultrasound in her pregnancy. She does not believe her menstrual cycles have changes or worsened since then.  She sees Dr. Russo at [facility], last appointment 2024. She was able to show me a TVUS from 2024 which noted 11 x 5 x 5 cm uterus with 2x2 cm intramural fibroid as well as solid left ovarian cyst. After this ultrasound, she was referred to a GYN surgeon, with whom she has an appointment in March.  Currently on a lighter flow of her cycle with reduced bleeding today. Denies dizziness, lightheadedness, SOB.    OB H/x: VToP D&C x2, SAB, pC/S  (NRFHT i/s/o/ oligo/ ?IUGR at 30 weeks), rC/S  (term)  GYN H/x: fibroids and ovarian cyst as above. Abnormal pap with normal colpo. Denies STIs    MED H/x: SLE, HTN, HLD, DAVID  SURG H/x: C/S x2, R ganglion cyst removal  Medications: plaquenil 200 mg BID, amlodipine 10 mg qd, PO iron, ASA  Allergies: NKDA    Vital Signs Last 24 Hrs  T(C): 36.9 (2024 10:00), Max: 37.2 (2024 21:53)  T(F): 98.5 (2024 10:00), Max: 98.9 (2024 21:53)  HR: 81 (2024 12:22) (72 - 93)  BP: 122/73 (2024 12:22) (110/67 - 130/72)  BP(mean): 92 (2024 12:22) (82 - 92)  RR: 18 (2024 12:22) (17 - 18)  SpO2: 96% (2024 12:22) (95% - 96%)    Parameters below as of 2024 12:22  Patient On (Oxygen Delivery Method): room air        Physical Exam:  Gen: NAD, comfortable  GI: soft, nontender, nondistended, no rebound no guarding, no pelvic pathology palpated, although would be difficult 2/2 body habitus  BME/ Spec: patient declined  Ext: no edema, erythema, tenderness     LABS:                        10.9   4.00  )-----------( 311      ( 2024 05:30 )             34.9     02-05    133<L>  |  102  |  14  ----------------------------<  164<H>  4.0   |  22  |  0.84    Ca    9.4      2024 05:30  Phos  3.4     02-05  Mg     1.9     02-05    TPro  8.5<H>  /  Alb  4.2  /  TBili  0.3  /  DBili  x   /  AST  54<H>  /  ALT  22  /  AlkPhos  74  02-05      Urinalysis Basic - ( 2024 05:30 )    Color: x / Appearance: x / SG: x / pH: x  Gluc: 164 mg/dL / Ketone: x  / Bili: x / Urobili: x   Blood: x / Protein: x / Nitrite: x   Leuk Esterase: x / RBC: x / WBC x   Sq Epi: x / Non Sq Epi: x / Bacteria: x        RADIOLOGY & ADDITIONAL STUDIES:

## 2024-02-05 NOTE — PROGRESS NOTE ADULT - ATTENDING COMMENTS
Ms Emma Whipple is a 44 y/o F w/ PMH  SLE (on plaquinel), HTN, anemia, heavy menstrual cycles (saturated 12-14 tampons per 24 hours), uterine fibroid, L ovary cyst, spinal stenosis (daily NSAID use) who presented with c/o mid epigastric pain/mid chest pain, generalized fatigue, and SOB for 3 days, was found to have STEMI and 100% occlusion of the proximal LAD, s/p LI to prox LAD. Remains  free.       1. CAD, native heart, native vessel, without angina  2. STEMI< subsequent visit  3. LI LAD  Pain free   s/p PCI w/ stent to proximal LAD via L radial access. s/p aspirin and brillinta load.  - metoprolol tartrate 12.5 mg BID  -brilinta 90 mg BID  -aspirin 81 mg QD  -atorvastatin 80mg q24    EKG as noted w evolving changes of anterior MI". However EKG today shows recurrent of ST elevation in anterior leads: in absenc eof angina, doubt ischemia, concern re aneurysm Will repeat TTE    4, Acute systolic HF   Complete TTE showing EF 30-35%  Euvolemic  Continue GDMT     5. Hyperlipidmeia, target LDL < 70  Total cholesterol 198; HDL 56, . Goal LDL given CAD is LDL of 70.  - c/w atorvastatin 80 mg QHS .    6. Apical dyskinesis  EKG changes from apical dyskinesis. Will continue GDMT.  Discussed risk of stroke and apical thrombus. Will proceed with triple therapy for 30 days, then repeat echo.  Meanwhile, given iron deficiency anemia from periods, consult gynecology ---- ideally would suppress periods without increasing risk of thromboembolism -- ? IUD - 2 teenager children, not interested in pregnancy.      Recheck in 30 days, if remains > 70, consider ezetimibe and repatha. Ms Emma Whipple is a 42 y/o F w/ PMH  SLE (on plaquinel), HTN, anemia, heavy menstrual cycles (saturated 12-14 tampons per 24 hours), uterine fibroid, L ovary cyst, spinal stenosis (daily NSAID use) who presented with c/o mid epigastric pain/mid chest pain, generalized fatigue, and SOB for 3 days, was found to have STEMI and 100% occlusion of the proximal LAD, s/p LI to prox LAD. Remains  free.       1. CAD, native heart, native vessel, without angina  2. STEMI< subsequent visit  3. LI LAD  Pain free   s/p PCI w/ stent to proximal LAD via L radial access. s/p aspirin and brillinta load.  - metoprolol tartrate 12.5 mg BID  -brilinta 90 mg BID  -aspirin 81 mg QD  -atorvastatin 80mg q24    EKG as noted w evolving changes of anterior MI". However EKG today shows recurrent of ST elevation in anterior leads: in absenc eof angina, doubt ischemia, concern re aneurysm Will repeat TTE    4, Acute systolic HF   Complete TTE showing EF 30-35%  Euvolemic  Continue GDMT     5. Hyperlipidmeia, target LDL < 70  Total cholesterol 198; HDL 56, . Goal LDL given CAD is LDL of 70.  - c/w atorvastatin 80 mg QHS .    6. Apical dyskinesis  EKG changes from apical dyskinesis. Will continue GDMT.  Discussed risk of stroke and apical thrombus. Will proceed with triple therapy for 30 days, then repeat echo.  Meanwhile, given iron deficiency anemia from periods, consult gynecology ---- ideally would suppress periods without increasing risk of thromboembolism -- ? IUD - 2 teenager children, not interested in pregnancy.      Recheck in 30 days, if remains > 70, consider ezetimibe and repatha.    I have personally and independently provided 60 minutes of critical care services.  This excludes any time spent on separate procedures or teaching.

## 2024-02-05 NOTE — CONSULT NOTE ADULT - ASSESSMENT
43y  LMP  with PMH SLE, HTN, HLD, and AUB-L presenting with STEMI, admitted to cardiology s/p LAD stent placement on . GYN consulted for recommendations for AUB in the setting of anticoagulation therapy for STEMI.    #AUB-L  -patient reports heavy menstrual bleeding in the setting of known uterine fibroids; however, the patient reports the level of bleeding has not recently changed   -    ---incomplete---- 43y  LMP  with PMH SLE, HTN, HLD, and AUB-L presenting with STEMI, admitted to cardiology s/p LAD stent placement on . GYN consulted for recommendations for AUB in the setting of anticoagulation therapy for STEMI.    #AUB-L  Patient reports heavy menstrual bleeding in the setting of known uterine fibroids and based on recent outpatient ultrasound at [facility], patient has normal size uterus with small 2x2 cm fibroid. The patient reports the level of bleeding has not recently or acutely changed. Her Hgb is stable from admission and outpatient based on [facility] records. She also has a hematologist who she follows for management of her iron-deficiency anemia. In the setting of SLE as well as anticoagulation therapy for STEMI, would not recommend systemic hormonal contraception to manage the heavy cycles. Discussed with patient definitive surgical management of fibroids, although not necessary as she has one small fibroid. Also discussed with patient that she is not a good surgical candidate at this time, although she may meet with the GYN surgeon in March if she would like to discuss future options.  -recommend outpatient Mirena IUD placement with GYN at [facility]   -no acute inpatient GYN management    GYN to sign off. Please reconsult if additional issues arise.    MD Mark PGY3  D/W Dr. Sauceda, OB/GYN attending 43y  LMP  with PMH SLE, HTN, HLD, and AUB-L presenting with STEMI, admitted to cardiology s/p LAD stent placement on . GYN consulted for recommendations for AUB in the setting of anticoagulation therapy for STEMI.    #AUB-L  Patient reports heavy menstrual bleeding in the setting of known uterine fibroids and based on recent outpatient ultrasound at [facility], patient has normal size uterus with small 2x2 cm fibroid. The patient reports the level of bleeding has not recently or acutely changed. Her Hgb is stable from admission and outpatient based on [facility] records. She also has a hematologist who she follows for management of her iron-deficiency anemia. In the setting of SLE as well as anticoagulation therapy for STEMI, would not recommend systemic hormonal contraception to manage the heavy cycles. Discussed with patient definitive surgical management of fibroids, although not necessary as she has one small fibroid. Also discussed with patient that she is not a good surgical candidate at this time, although she may meet with the GYN surgeon in March if she would like to discuss future options.  -recommend outpatient Mirena IUD placement with GYN at [facility]   -would not hold additional anticoagulation or antiplatelet therapy for bleeding as this is a normal cycle for the patient and her bleeding is improved  -no acute inpatient GYN management    GYN to sign off. Please reconsult if additional issues arise.    MD Mark PGY3  D/W Dr. Sauceda, OB/GYN attending

## 2024-02-06 PROBLEM — Z00.00 ENCOUNTER FOR PREVENTIVE HEALTH EXAMINATION: Status: ACTIVE | Noted: 2024-02-06

## 2024-02-07 ENCOUNTER — NON-APPOINTMENT (OUTPATIENT)
Age: 44
End: 2024-02-07

## 2024-02-09 DIAGNOSIS — N83.202 UNSPECIFIED OVARIAN CYST, LEFT SIDE: ICD-10-CM

## 2024-02-09 DIAGNOSIS — M32.9 SYSTEMIC LUPUS ERYTHEMATOSUS, UNSPECIFIED: ICD-10-CM

## 2024-02-09 DIAGNOSIS — I11.0 HYPERTENSIVE HEART DISEASE WITH HEART FAILURE: ICD-10-CM

## 2024-02-09 DIAGNOSIS — I21.3 ST ELEVATION (STEMI) MYOCARDIAL INFARCTION OF UNSPECIFIED SITE: ICD-10-CM

## 2024-02-09 DIAGNOSIS — N92.0 EXCESSIVE AND FREQUENT MENSTRUATION WITH REGULAR CYCLE: ICD-10-CM

## 2024-02-09 DIAGNOSIS — R07.9 CHEST PAIN, UNSPECIFIED: ICD-10-CM

## 2024-02-09 DIAGNOSIS — D25.9 LEIOMYOMA OF UTERUS, UNSPECIFIED: ICD-10-CM

## 2024-02-09 DIAGNOSIS — I25.10 ATHEROSCLEROTIC HEART DISEASE OF NATIVE CORONARY ARTERY WITHOUT ANGINA PECTORIS: ICD-10-CM

## 2024-02-09 DIAGNOSIS — I50.21 ACUTE SYSTOLIC (CONGESTIVE) HEART FAILURE: ICD-10-CM

## 2024-02-09 DIAGNOSIS — D50.0 IRON DEFICIENCY ANEMIA SECONDARY TO BLOOD LOSS (CHRONIC): ICD-10-CM

## 2024-02-09 DIAGNOSIS — E78.5 HYPERLIPIDEMIA, UNSPECIFIED: ICD-10-CM

## 2024-02-14 ENCOUNTER — APPOINTMENT (OUTPATIENT)
Dept: HEART AND VASCULAR | Facility: CLINIC | Age: 44
End: 2024-02-14
Payer: MEDICAID

## 2024-02-14 ENCOUNTER — NON-APPOINTMENT (OUTPATIENT)
Age: 44
End: 2024-02-14

## 2024-02-14 VITALS
TEMPERATURE: 98.5 F | BODY MASS INDEX: 43.47 KG/M2 | OXYGEN SATURATION: 99 % | WEIGHT: 277 LBS | HEART RATE: 85 BPM | HEIGHT: 67 IN | DIASTOLIC BLOOD PRESSURE: 60 MMHG | SYSTOLIC BLOOD PRESSURE: 101 MMHG

## 2024-02-14 DIAGNOSIS — Z83.3 FAMILY HISTORY OF DIABETES MELLITUS: ICD-10-CM

## 2024-02-14 DIAGNOSIS — Z82.49 FAMILY HISTORY OF ISCHEMIC HEART DISEASE AND OTHER DISEASES OF THE CIRCULATORY SYSTEM: ICD-10-CM

## 2024-02-14 DIAGNOSIS — Z87.891 PERSONAL HISTORY OF NICOTINE DEPENDENCE: ICD-10-CM

## 2024-02-14 DIAGNOSIS — Z78.9 OTHER SPECIFIED HEALTH STATUS: ICD-10-CM

## 2024-02-14 PROCEDURE — 93000 ELECTROCARDIOGRAM COMPLETE: CPT

## 2024-02-14 PROCEDURE — G2211 COMPLEX E/M VISIT ADD ON: CPT | Mod: NC,1L

## 2024-02-14 PROCEDURE — 99214 OFFICE O/P EST MOD 30 MIN: CPT

## 2024-02-14 PROCEDURE — 99496 TRANSJ CARE MGMT HIGH F2F 7D: CPT

## 2024-02-14 RX ORDER — HYDROXYCHLOROQUINE SULFATE 200 MG/1
200 TABLET, FILM COATED ORAL TWICE DAILY
Refills: 0 | Status: ACTIVE | COMMUNITY

## 2024-02-14 NOTE — HISTORY OF PRESENT ILLNESS
[FreeTextEntry1] : 44 y/o F w/ PMH  SLE (on plaquinel), HTN, anemia,  heavy menstrual cycles (saturated 12-14 tampons per 24 hours), uterine fibroid,  L ovary cyst, spinal stenosis (daily NSAID use) who presented with c/o mid  epigastric pain/mid chest pain, generalized fatigue, and SOB for 3 days. Found  to have STEMI s/p PCI with stent placed in pLAD due to 100% occlusion.  TTE post-cath showed an EF of  30-35%. Started metoprolol tartrate 25mg BID and entrestro 24-26 mg BID.  Post-MI EKG on 2/5 showed recurrent of ST elevation in anterior leads which  raised concern for aneurysm. Repeat TTE to evaluate for cardiac aneurysm showed  severe hypokinesis. Started on triple therapy -- eliquis 5 mg BID for 30 days  alongside ASA and brilinta.  OBGYN consulted regarding recommendations for  contraception in light of her menorrhagia and prothrombotic risks from SLE  given need for triple therapy, outpatient mirena IUD recommended.    TTE 2/3:   1. Moderately reduced left ventricular systolic function, LVEF 30-35%.   2. Multiple segmental abnormalities exist. See findings.   3. Grade I left ventricular diastolic dysfunction.   4. Normal right ventricular size and systolic function.   5. No significant valvular disease.   6. No evidence of pulmonary hypertension, pulmonary artery systolic pressure  is 25 mmHg.   7. Trivial pericardial effusion.    TTE 2/5:  1. Limited study obtained for evaluation of left ventricular function performed  by cardiology fellow on call.  2. Severe hypokinesis of the mid to apical septum. Dyskinetic apex. No LV  thrombus.  3. The right ventricle is normal in size. Right ventricular systolic function  is normal.  4. No pericardial effusion.    Hospital course (by problem):    #STEMI (ST elevation myocardial infarction).  s/p PCI w/ stent to proximal LAD via L radial access. s/p aspirin and brillinta  load. TTE shows reduced EF to 30-35% with severe hypokinesis  - c/w metoprolol tartrate 25 mg BID  - c/w entresto 24-26 mg BID  - c/w brilinta 90 mg BID  - c/w aspirin 81 mg QD  - c/w eliquis 5 mg BID - started emprically for possible thrombus evolving in apical aneurysm   - c/w atorvastatin 80mg QD    #HTN (hypertension).  On home amlodipine 10mg QD. Normotensive on admission and post-cath.  - hold amlodipine  - c/w metoprolol tartrate 12.5 mg BID  - c/w entresto 24-26 mg BID    #Heart palpitations.  Pt endorses several-year hx of palpitations which occur randomly. Reports that  she saw a cardiologist for evaluation, holter monitor read some "hiccups" but  was never told she had an arrythmia. Was told it could be attributed to her  anemia or SLE.  - c/w metoprolol tartrate 25 mg BID  - c/w eliquis 5 mg BID    #Hyperlipidemia  Total cholesterol 198; HDL 56, . Goal LDL given CAD is LDL of 70.  - c/w atorvastatin 80 mg QHS.    #SLE (systemic lupus erythematosus).  Patient w/ SLE, on plaquenil 200  Since discharge no further CP. Denies SOB Energy level improving gradually, able to start low level exercise bike Exertion still limited by back pain (SLE)

## 2024-02-14 NOTE — ASSESSMENT
[FreeTextEntry1] : EKG NSR anterior MI< IWMI< deep Tw inversions anterior, inferior , lateral    A/P   1. DAD, native artery, native heart, without angina  2. STEMI (ST elevation myocardial infarction), subsequent visit  3. s/p PCI w/ LI to proximal LAD Remains freee of angina Gradually increasing exercise. Continue DAPT w aspirin and brillinta If GYN bleeding continues as an issue, can stop aspirin for now  Risk modification  5. acute systolic HF TTE shows reduced EF to 30-35% with severe hypokinesis Continue GDMT - c/w metoprolol tartrate 25 mg BID - c/w entresto 24-26 mg BID - c/w eliquis 5 mg BID - started emprically for possible thrombus evolving in apical aneurysm follow next visit - add sprio then  repeat echo 1 mo   6. HTN (hypertension). BP at goal Off  home amlodipine 10mg QD.  Changed instead to GDMT as above - c/w metoprolol tartrate 12.5 mg BIDand  entresto 24-26 mg BID  7. Heart palpitations. Pt endorsed several-year hx of palpitations which occur randomly. Reports that she saw a cardiologist for evaluation, holter monitor read some "hiccups" but was never told she had an arrythmia . No AF in hospital tele  - c/w metoprolol tartrate 25 mg BID - c/w eliquis 5 mg BID (which was started emrically for LV thrombus prohylaxis as above, no documenetd AFib)   8. Hyperlipidemia, target LDL < 70 In hosp Total cholesterol 198; HDL 56, . Goal LDL given CAD is LDL of 70. - c/w atorvastatin 80 mg QHS.    #SLE (systemic lupus erythematosus).  Patient w/ SLE, on plaquenil 200  -------------------------------------------------------------------------------------- TCM visit linked to RN post discharge encounter as below  Followed up w/ pt after recent discharge from [facility]. Ms Emma Sarabjit is a 42 y/o F w/ PMH SLE (on plaquinel), HTN, anemia, heavy menstrual cycles (saturated 12-14 tampons per 24 hours), uterine fibroid, L ovary cyst, spinal stenosis (daily NSAID use) who presented to the ED with mid epigastric pain/mid chest pain, generalized fatigue, and SOB for 3 days, was found to have STEMI and 100% occlusion of the proximal LAD, s/p cath lab and stent to prox LAD. Pt has no current complaints and endorses feeling much better. Pt states they did feel occasional dizziness yesterday but it has now subsided. Pt denies sob, cp, and n/v/d. Writer made pt aware should they begin to experience cp, sob, and symptoms that brought them to the ED in the first place they should go back to the ER. Pt verbalized understanding. Writer reminded pt of appointment with Dr. Montgomery on 02/14 and to call our office should they have any further questions.      Electronically signed by : ROSS DOBSON R.N.; Feb 7 2024  9:35AM Eastern Standard Time (Author)

## 2024-02-28 ENCOUNTER — APPOINTMENT (OUTPATIENT)
Dept: HEART AND VASCULAR | Facility: CLINIC | Age: 44
End: 2024-02-28
Payer: MEDICAID

## 2024-02-28 ENCOUNTER — NON-APPOINTMENT (OUTPATIENT)
Age: 44
End: 2024-02-28

## 2024-02-28 ENCOUNTER — TRANSCRIPTION ENCOUNTER (OUTPATIENT)
Age: 44
End: 2024-02-28

## 2024-02-28 VITALS
BODY MASS INDEX: 45.32 KG/M2 | HEIGHT: 65 IN | TEMPERATURE: 98.5 F | WEIGHT: 272 LBS | HEART RATE: 83 BPM | OXYGEN SATURATION: 99 % | DIASTOLIC BLOOD PRESSURE: 70 MMHG | SYSTOLIC BLOOD PRESSURE: 110 MMHG

## 2024-02-28 PROCEDURE — G2211 COMPLEX E/M VISIT ADD ON: CPT | Mod: NC,1L

## 2024-02-28 PROCEDURE — 99214 OFFICE O/P EST MOD 30 MIN: CPT | Mod: 25

## 2024-02-28 PROCEDURE — 93000 ELECTROCARDIOGRAM COMPLETE: CPT

## 2024-02-28 RX ORDER — FERROUS SULFATE 325(65) MG
325 TABLET ORAL
Refills: 0 | Status: ACTIVE | COMMUNITY

## 2024-02-28 NOTE — ASSESSMENT
[FreeTextEntry1] : EKG NSR anterior MI< IWMI< deep Tw inversions anterior, inferior , lateral (no change form prior)    A/P  1. CAD, native artery, native heart, without angina 2. STEMI (ST elevation myocardial infarction), subsequent visit 3. s/p PCI w/ LI to proximal LAD Remains freee of angina Of note, complaint today noted  - L shoulder and back pain, non exertional, and completely different from pre PCI symptoms Gradually increasing exercise. Continue DAPT w aspirin and brillinta If GYN bleeding continues as an issue, can stop aspirin for now Risk modification  5. 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 HTN (hypertension). BP at goal Off home amlodipine 10mg QD. Changed instead to GDMT as above - c/w metoprolol tartrate 12.5 mg BIDand entresto 24-26 mg BID  7. Heart palpitations. Pt endorsed several-year hx of palpitations which occur randomly. Reports that she saw a cardiologist for evaluation, holter monitor read some "hiccups" but was never told she had an arrythmia. No AF in hospital tele - c/w metoprolol tartrate 25 mg BID - c/w eliquis 5 mg BID (which was started emrically for LV thrombus prohylaxis as above, no documenetd AFib)  8. Hyperlipidemia, target LDL < 70 In hosp Total cholesterol 198; HDL 56, . Goal LDL given CAD is LDL of 70. - c/w atorvastatin 80 mg QHS.   #SLE (systemic lupus erythematosus).  Patient w/ SLE, on plaquenil 200

## 2024-02-28 NOTE — PHYSICAL EXAM
[Well Nourished] : well nourished [Well Developed] : well developed [No Acute Distress] : no acute distress [Normal Conjunctiva] : normal conjunctiva [Normal Venous Pressure] : normal venous pressure [No Carotid Bruit] : no carotid bruit [Normal S1, S2] : normal S1, S2 [No Murmur] : no murmur [No Gallop] : no gallop [No Rub] : no rub [Clear Lung Fields] : clear lung fields [Good Air Entry] : good air entry [No Respiratory Distress] : no respiratory distress  [Soft] : abdomen soft [Non Tender] : non-tender [No Masses/organomegaly] : no masses/organomegaly [Normal Bowel Sounds] : normal bowel sounds [Normal Gait] : normal gait [No Edema] : no edema [No Cyanosis] : no cyanosis [No Clubbing] : no clubbing [No Varicosities] : no varicosities [No Rash] : no rash [No Skin Lesions] : no skin lesions [Moves all extremities] : moves all extremities [No Focal Deficits] : no focal deficits [Normal Speech] : normal speech [Alert and Oriented] : alert and oriented [Normal memory] : normal memory

## 2024-02-28 NOTE — HISTORY OF PRESENT ILLNESS
[FreeTextEntry1] : 42 y/o F w/ PMH SLE (on plaquinel), HTN, anemia,  heavy menstrual cycles (saturated 12-14 tampons per 24 hours), uterine fibroid,  L ovary cyst, spinal stenosis (daily NSAID use) who presented with c/o mid  epigastric pain/mid chest pain, generalized fatigue, and SOB for 3 days. Found  to have STEMI s/p PCI with stent placed in pLAD due to 100% occlusion. TTE post-cath showed an EF of  30-35%. Started metoprolol tartrate 25mg BID and entrestro 24-26 mg BID.  Post-MI EKG on 2/5 showed recurrent of ST elevation in anterior leads which  raised concern for aneurysm. Repeat TTE to evaluate for cardiac aneurysm showed  severe hypokinesis. Started on triple therapy -- eliquis 5 mg BID for 30 days  alongside ASA and brilinta. OBGYN consulted regarding recommendations for  contraception in light of her menorrhagia and prothrombotic risks from SLE  given need for triple therapy, outpatient mirena IUD recommended.    TTE 2/3:  1. Moderately reduced left ventricular systolic function, LVEF 30-35%.  2. Multiple segmental abnormalities exist. See findings.  3. Grade I left ventricular diastolic dysfunction.  4. Normal right ventricular size and systolic function.  5. No significant valvular disease.  6. No evidence of pulmonary hypertension, pulmonary artery systolic pressure  is 25 mmHg.  7. Trivial pericardial effusion.    TTE 2/5:  1. Limited study obtained for evaluation of left ventricular function performed  by cardiology fellow on call.  2. Severe hypokinesis of the mid to apical septum. Dyskinetic apex. No LV  thrombus.  3. The right ventricle is normal in size. Right ventricular systolic function  is normal.  4. No pericardial effusion.    Hospital course (by problem):    #STEMI (ST elevation myocardial infarction).  s/p PCI w/ stent to proximal LAD via L radial access. s/p aspirin and brillinta  load. 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  On home amlodipine 10mg QD. Normotensive on admission and post-cath.  - hold amlodipine  - c/w metoprolol tartrate 12.5 mg BID  - c/w entresto 24-26 mg BID    #Heart palpitations.  Pt endorses several-year hx of palpitations which occur randomly. Reports that  she saw a cardiologist for evaluation, holter monitor read some "hiccups" but  was never told she had an arrythmia. Was told it could be attributed to her  anemia or SLE.  - c/w metoprolol tartrate 25 mg BID  - c/w eliquis 5 mg BID    #Hyperlipidemia  Total cholesterol 198; HDL 56, . Goal LDL given CAD is LDL of 70.  - c/w atorvastatin 80 mg QHS.    #SLE (systemic lupus erythematosus).  Patient w/ SLE, on plaquenil 200  Since discharge no further CP. Denies SOB Energy level improving gradually, able to start low level exercise bike Exertion still limited by back pain (SLE)  No further central / epigastric pain since PCI Past few days, palpitations (extra systoles) which is chronic and preceded her PCI.and L shoulder and back pain, non exertional, and completely different from pre PCI symptoms.

## 2024-03-06 RX ORDER — TICAGRELOR 90 MG/1
90 TABLET ORAL TWICE DAILY
Qty: 180 | Refills: 1 | Status: ACTIVE | COMMUNITY
Start: 1900-01-01 | End: 1900-01-01

## 2024-03-14 ENCOUNTER — APPOINTMENT (OUTPATIENT)
Dept: HEART AND VASCULAR | Facility: CLINIC | Age: 44
End: 2024-03-14
Payer: MEDICAID

## 2024-03-14 VITALS
BODY MASS INDEX: 45.15 KG/M2 | SYSTOLIC BLOOD PRESSURE: 109 MMHG | HEART RATE: 70 BPM | TEMPERATURE: 98.5 F | OXYGEN SATURATION: 99 % | DIASTOLIC BLOOD PRESSURE: 60 MMHG | WEIGHT: 271 LBS | HEIGHT: 65 IN

## 2024-03-14 DIAGNOSIS — I50.21 ACUTE SYSTOLIC (CONGESTIVE) HEART FAILURE: ICD-10-CM

## 2024-03-14 PROCEDURE — 93306 TTE W/DOPPLER COMPLETE: CPT

## 2024-03-14 PROCEDURE — 99214 OFFICE O/P EST MOD 30 MIN: CPT | Mod: 25

## 2024-03-14 PROCEDURE — 99214 OFFICE O/P EST MOD 30 MIN: CPT

## 2024-03-14 PROCEDURE — G2211 COMPLEX E/M VISIT ADD ON: CPT | Mod: NC,1L

## 2024-03-14 RX ORDER — SPIRONOLACTONE 25 MG/1
25 TABLET ORAL
Qty: 30 | Refills: 5 | Status: ACTIVE | COMMUNITY
Start: 2024-03-14 | End: 1900-01-01

## 2024-03-14 NOTE — ASSESSMENT
[FreeTextEntry1] : echo   1. All apical segments are akinetic: The apical lateral segment, apical anterior segment, mid and apical inferior septum, apical inferior segment, apical septal segment and apex are akinetic. The mid inferior segment is hypokinetic. 2. There is no evidence of a left ventricular thrombus. 3. There is severe (grade 3) left ventricular diastolic dysfunction. 4. Normal right ventricular cavity size and normal wall thickness,. 5. The left atrium is mildly dilated. 6. Tricuspid aortic valve with normal leaflet excursion. 7. Mild to moderate mitral regurgitation. 8. Estimated pulmonary artery systolic pressure is 28 mmHg. 9. No echocardiographic evidence of pulmonary hypertension. 10. No pericardial effusion seen.    A/P  1. CAD, native artery, native heart, without angina 2. STEMI (ST elevation myocardial infarction), subsequent visit 3. s/p PCI w/ LI to proximal LAD Remains freee of angina Gradually increasing exercise. Continue DAPT w aspirin and brillinta If GYN bleeding continues as an issue, can stop aspirin for now Risk modification dstarting cardiac rehab [facility]   5. acute systolic HF TTE showed reduced EF to 30-35% with severe hypokinesis; LVEF wlightly improved today to 40%  Continue GDMT - c/w metoprolol tartrate 25 mg BID - c/w entresto 24-26 mg BID -  add sprio / recheck labs 1 wwek  - Definity contrast echo today no thrombus:  d/c eliquids    6. HTN (hypertension). BP at goal Off home amlodipine 10mg QD. Changed instead to GDMT as above - c/w metoprolol tartrate 12.5 mg BIDand entresto 24-26 mg BID  7. Heart palpitations. Pt endorsed several-year hx of palpitations which occur randomly. Reports that she saw a cardiologist for evaluation, holter monitor read some "hiccups" but was never told she had an arrythmia. No AF in hospital tele - c/w metoprolol tartrate 25 mg BID - cd/c eliquis 5 mg BID (no documenetd AFib)  8. Hyperlipidemia, target LDL < 70 In hosp Total cholesterol 198; HDL 56, . Goal LDL given CAD is LDL of 70. - c/w atorvastatin 80 mg QHS.   #SLE (systemic lupus erythematosus).  Patient w/ SLE, on plaquenil 200.

## 2024-03-14 NOTE — PHYSICAL EXAM
[Well Nourished] : well nourished [Well Developed] : well developed [No Acute Distress] : no acute distress [Normal Conjunctiva] : normal conjunctiva [No Carotid Bruit] : no carotid bruit [Normal Venous Pressure] : normal venous pressure [No Murmur] : no murmur [No Rub] : no rub [Normal S1, S2] : normal S1, S2 [No Gallop] : no gallop [Clear Lung Fields] : clear lung fields [No Respiratory Distress] : no respiratory distress  [Good Air Entry] : good air entry [No Masses/organomegaly] : no masses/organomegaly [Soft] : abdomen soft [Non Tender] : non-tender [Normal Bowel Sounds] : normal bowel sounds [Normal Gait] : normal gait [No Clubbing] : no clubbing [No Edema] : no edema [No Cyanosis] : no cyanosis [No Varicosities] : no varicosities [No Rash] : no rash [No Skin Lesions] : no skin lesions [Moves all extremities] : moves all extremities [No Focal Deficits] : no focal deficits [Normal Speech] : normal speech [Alert and Oriented] : alert and oriented [Normal memory] : normal memory

## 2024-03-14 NOTE — HISTORY OF PRESENT ILLNESS
[FreeTextEntry1] : 44 y/o F w/ PMH SLE (on plaquinel), HTN, anemia,  heavy menstrual cycles (saturated 12-14 tampons per 24 hours), uterine fibroid,  L ovary cyst, spinal stenosis (daily NSAID use) who presented with c/o mid  epigastric pain/mid chest pain, generalized fatigue, and SOB for 3 days. Found  to have STEMI s/p PCI with stent placed in pLAD due to 100% occlusion. TTE post-cath showed an EF of  30-35%. Started metoprolol tartrate 25mg BID and entrestro 24-26 mg BID.  Post-MI EKG on 2/5 showed recurrent of ST elevation in anterior leads which  raised concern for aneurysm. Repeat TTE to evaluate for cardiac aneurysm showed  severe hypokinesis. Started on triple therapy -- eliquis 5 mg BID for 30 days  alongside ASA and brilinta. OBGYN consulted regarding recommendations for  contraception in light of her menorrhagia and prothrombotic risks from SLE  given need for triple therapy, outpatient mirena IUD recommended.    TTE 2/3:  1. Moderately reduced left ventricular systolic function, LVEF 30-35%.  2. Multiple segmental abnormalities exist. See findings.  3. Grade I left ventricular diastolic dysfunction.  4. Normal right ventricular size and systolic function.  5. No significant valvular disease.  6. No evidence of pulmonary hypertension, pulmonary artery systolic pressure  is 25 mmHg.  7. Trivial pericardial effusion.    TTE 2/5:  1. Limited study obtained for evaluation of left ventricular function performed  by cardiology fellow on call.  2. Severe hypokinesis of the mid to apical septum. Dyskinetic apex. No LV  thrombus.  3. The right ventricle is normal in size. Right ventricular systolic function  is normal.  4. No pericardial effusion.    Hospital course (by problem):    #STEMI (ST elevation myocardial infarction).  s/p PCI w/ stent to proximal LAD via L radial access. s/p aspirin and brillinta  load. 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  On home amlodipine 10mg QD. Normotensive on admission and post-cath.  - hold amlodipine  - c/w metoprolol tartrate 12.5 mg BID  - c/w entresto 24-26 mg BID    #Heart palpitations.  Pt endorses several-year hx of palpitations which occur randomly. Reports that  she saw a cardiologist for evaluation, holter monitor read some "hiccups" but  was never told she had an arrythmia. Was told it could be attributed to her  anemia or SLE.  - c/w metoprolol tartrate 25 mg BID  - c/w eliquis 5 mg BID    #Hyperlipidemia  Total cholesterol 198; HDL 56, . Goal LDL given CAD is LDL of 70.  - c/w atorvastatin 80 mg QHS.    #SLE (systemic lupus erythematosus).  Patient w/ SLE, on plaquenil 200  Since discharge no further CP. Denies SOB Energy level improving gradually, able to start low level exercise bike Exertion still limited by back pain (SLE)  No further central / epigastric pain since PCI Past few days, palpitations (extra systoles) which is chronic and preceded her PCI.and L shoulder and back pain, non exertional, and completely different from pre PCI symptoms.have resolved   ABle to walk further now without SOB

## 2024-03-23 ENCOUNTER — EMERGENCY (EMERGENCY)
Facility: HOSPITAL | Age: 44
LOS: 1 days | Discharge: HOME | End: 2024-03-23
Attending: STUDENT IN AN ORGANIZED HEALTH CARE EDUCATION/TRAINING PROGRAM | Admitting: STUDENT IN AN ORGANIZED HEALTH CARE EDUCATION/TRAINING PROGRAM
Payer: COMMERCIAL

## 2024-03-23 VITALS
DIASTOLIC BLOOD PRESSURE: 56 MMHG | HEART RATE: 65 BPM | OXYGEN SATURATION: 99 % | RESPIRATION RATE: 18 BRPM | SYSTOLIC BLOOD PRESSURE: 105 MMHG

## 2024-03-23 VITALS
OXYGEN SATURATION: 100 % | HEIGHT: 67 IN | SYSTOLIC BLOOD PRESSURE: 114 MMHG | RESPIRATION RATE: 18 BRPM | DIASTOLIC BLOOD PRESSURE: 86 MMHG | TEMPERATURE: 98 F | HEART RATE: 66 BPM

## 2024-03-23 DIAGNOSIS — R07.89 OTHER CHEST PAIN: ICD-10-CM

## 2024-03-23 DIAGNOSIS — M32.9 SYSTEMIC LUPUS ERYTHEMATOSUS, UNSPECIFIED: ICD-10-CM

## 2024-03-23 DIAGNOSIS — I10 ESSENTIAL (PRIMARY) HYPERTENSION: ICD-10-CM

## 2024-03-23 DIAGNOSIS — D25.9 LEIOMYOMA OF UTERUS, UNSPECIFIED: ICD-10-CM

## 2024-03-23 DIAGNOSIS — I25.2 OLD MYOCARDIAL INFARCTION: ICD-10-CM

## 2024-03-23 DIAGNOSIS — Z79.82 LONG TERM (CURRENT) USE OF ASPIRIN: ICD-10-CM

## 2024-03-23 LAB
ADD ON TEST-SPECIMEN IN LAB: SIGNIFICANT CHANGE UP
TROPONIN T, HIGH SENSITIVITY RESULT: 7 NG/L — SIGNIFICANT CHANGE UP (ref 0–51)

## 2024-03-23 PROCEDURE — 84702 CHORIONIC GONADOTROPIN TEST: CPT

## 2024-03-23 PROCEDURE — 80048 BASIC METABOLIC PNL TOTAL CA: CPT

## 2024-03-23 PROCEDURE — 83690 ASSAY OF LIPASE: CPT

## 2024-03-23 PROCEDURE — 36415 COLL VENOUS BLD VENIPUNCTURE: CPT

## 2024-03-23 PROCEDURE — 74174 CTA ABD&PLVS W/CONTRAST: CPT | Mod: 26,MC

## 2024-03-23 PROCEDURE — 71275 CT ANGIOGRAPHY CHEST: CPT | Mod: 26,MC

## 2024-03-23 PROCEDURE — 99284 EMERGENCY DEPT VISIT MOD MDM: CPT | Mod: 25

## 2024-03-23 PROCEDURE — 71045 X-RAY EXAM CHEST 1 VIEW: CPT

## 2024-03-23 PROCEDURE — 71045 X-RAY EXAM CHEST 1 VIEW: CPT | Mod: 26

## 2024-03-23 PROCEDURE — 74174 CTA ABD&PLVS W/CONTRAST: CPT | Mod: MC

## 2024-03-23 PROCEDURE — 85025 COMPLETE CBC W/AUTO DIFF WBC: CPT

## 2024-03-23 PROCEDURE — 99285 EMERGENCY DEPT VISIT HI MDM: CPT

## 2024-03-23 PROCEDURE — 71275 CT ANGIOGRAPHY CHEST: CPT | Mod: MC

## 2024-03-23 PROCEDURE — 84484 ASSAY OF TROPONIN QUANT: CPT

## 2024-03-23 NOTE — ED PROVIDER NOTE - CLINICAL SUMMARY MEDICAL DECISION MAKING FREE TEXT BOX
42 yo F presenting with intermittent cp, exertional, recent stemi, on asa and brilinta. EKG with nonspecific changes. will ro acs. No pleuritic component, vascular exam wnl, will closely monitor.

## 2024-03-23 NOTE — ED PROVIDER NOTE - OBJECTIVE STATEMENT
45 y/o F w/ PMH  SLE (on plaquinel), HTN, anemia, heavy menstrual cycles (saturated 12-14 tampons per 24 hours), uterine fibroid, L ovary cyst, spinal stenosis, recent STEMI in Feb 2024 with LI X 1 to pLAD on ASA and brilinta presenting with 1 day of intermittent exertional chest pain, some radiation to back. Nonpleuritic. No fever, chills, cough, sob, recent travel, history of dvt or pe, leg pain or leg swelling. No lightheadedness or dizziness. No other acute complaints. ROS as above.

## 2024-03-23 NOTE — ED ADULT NURSE NOTE - OBJECTIVE STATEMENT
Pt presented to the ED with complaints of chest tightness radiating to the back that started yesterday afternoon. As per pt, she had a stent placed in February. EMS administered aspirin prior to arrival.

## 2024-03-23 NOTE — ED PROVIDER NOTE - PATIENT PORTAL LINK FT
You can access the FollowMyHealth Patient Portal offered by [facility] by registering at the following website: [email or web address]. By joining [employer]’s FollowMyHealth portal, you will also be able to view your health information using other applications (apps) compatible with our system.

## 2024-03-23 NOTE — ED PROVIDER NOTE - NSICDXPASTMEDICALHX_GEN_ALL_CORE_FT
Clinical Nutrition   Reason For Visit: Admission assessment, MST score 2+    Patient Name: Balwinder Willams  YOB: 1964  MRN: [id number]  Date of Encounter: 02/04/21 10:44 EST  Admission date: 2/3/2021      Comments:       Nutrition Assessment     Admission Problem List:    Alpha-1-antitrypsin deficiency (on replacement)    Stage IV emphysema s/p EBV placement X3 w/ subsequent extraction    Acute on chronic respiratory failure with hypoxemia (CMS/HCC)    COPD exacerbation (CMS/HCC)         Applicable PMH:    PMH: He  has a past medical history of Alpha-1-antitrypsin deficiency (CMS/HCC), Asthma, extrinsic (smoke, pollen), Chronic bronchitis (CMS/HCC), COPD (chronic obstructive pulmonary disease) (CMS/[facility]), Cough, Emphysema of lung (CMS/HCC) (COPD diagnosed approximately 14 years ago), Lung nodule (I dont know), Recurrent pneumonia (9/9/2020), and Wears glasses.   PSxH: He  has a past surgical history that includes Bronchoscopy (N/A, 10/30/2019); Bronchoscopy (N/A, 10/24/2019); Bronchoscopy (10/24/19 & 10/29/19?); Bronchoscopy (N/A, 9/11/2020); Bronchoscopy (N/A, 10/30/2020); and Bronchoscopy (N/A, 12/21/2020).        Reported/Observed/Food/Nutrition Related History     Pt states he has had normal PO intake recently. Reports not feeling hungry for breakfast this am. Denies N/V. Reports 22-23# weight loss x 6 months (~15%). EMR review reveals 10# wt loss x 1 year (-7%). Pt attributes wt loss to frequent hospital admissions for pneumonia. States he drinks 2-3 boost at home per week.       Anthropometrics   Height: 68in  Weight: 129lbs  BMI: 19.62  BMI classification: Normal: 18.5-24.9kg/m2   IBW: 154lbs    UBW:   Weight change:     Weight Weight (kg) Weight (lbs) Weight Method VISIT REPORT   2/4/2021 58.514 kg 129 lb Bed scale -   2/3/2021 56.7 kg 125 lb Stated -   1/19/2021 55.611 kg 122 lb 9.6 oz - Report   1/13/2021 55.339 kg 122 lb - Report   1/4/2021 58.06 kg 128 lb - Report   12/31/2020 58.968 kg  130 lb - -   12/25/2020 55.6 kg 122 lb 9.2 oz Bed scale -   12/23/2020 57.8 kg 127 lb 6.8 oz Standing scale -   12/18/2020 61.236 kg 135 lb Stated -   12/11/2020 65.318 kg 144 lb - -   12/10/2020 66.225 kg 146 lb - -   12/9/2020 66.271 kg 146 lb 1.6 oz Bed scale -   12/5/2020 58.968 kg 130 lb Stated -   11/24/2020 58.968 kg 130 lb Stated -   11/24/2020 58.968 kg 130 lb - -         Nutrition Focused Physical Exam       Subcutaneous fat:  Orbital Moderate   Buccal Moderate   Tricep Unable to determine at this time   Ribs- thoracic and lumbar region, lower back, midaxillary line Unable to determine at this time     Muscle:  Temple/temporalis Moderate   Clavicle/acromion- pectoralis, deltoid Unable to determine at this time   Shoulder- deltoid Unable to determine at this time   Scapula- trapezius, latissimus dorsi Unable to determine at this time   Interosseous- dorsal hand Unable to determine at this time   Thigh- quadriceps Unable to determine at this time   Calf- gastrocnemius Unable to determine at this time       Decline in functional status: Yes    Labs reviewed   Labs reviewed: Yes    Results from last 7 days   Lab Units 02/04/21  0647 02/03/21  2152   SODIUM mmol/L 138 138   POTASSIUM mmol/L 4.4 4.4   CHLORIDE mmol/L 104 101   CO2 mmol/L 24.0 28.0   BUN mg/dL 12 15   CREATININE mg/dL 0.82 0.92   GLUCOSE mg/dL 189* 173*   CALCIUM mg/dL 9.0 8.9   MAGNESIUM mg/dL 2.1  --      Results from last 7 days   Lab Units 02/04/21  0647 02/03/21  2152   WBC 10*3/mm3 6.52 10.78   ALBUMIN g/dL 4.00 4.20         No results found for: HGBA1C  Medications reviewed   Medications reviewed: Yes  Pertinent: Pepcid      Needs Assessment   Height used:   Weight used:   IBW:     Estimated Calories needs:       Estimated Protein needs:       Estimated Fluid needs:       Current Nutrition Prescription   PO: Diet Regular  Oral Nutrition Supplement: No active supplement orders       Evaluation of Received Nutrient/Fluid  Intake:  Insufficient data       Nutrition Diagnosis     2/4/21  Problem Unintended weight loss   Etiology Prolonged catabolic illness, pneumonia, COPD   Signs/Symptoms 7-15% weight loss         Intervention   Intervention: Follow treatment progress, Care plan reviewed, Interview for preferences, Encourage intake, Supplement provided     - Boost 2x daily (chocolate)      Goal:   General: Nutrition support treatment  PO: Tolerate PO     Additional goals: No further weight loss      Monitoring/Evaluation:   Monitoring/Evaluation: Per protocol, I&O, PO intake, Supplement intake, Pertinent labs, Weight, GI status, Symptoms, POC/GOC    Sandee Campa RD  Time Spent: 30 min    PAST MEDICAL HISTORY:  Anemia     HTN (hypertension)     Menorrhagia     Palpitation     SLE (systemic lupus erythematosus)     Spinal stenosis

## 2024-03-23 NOTE — ED PROVIDER NOTE - PROGRESS NOTE DETAILS
pt states that pain has persisted with radiation to the back. labs ordered by overnight team. added on lipase. given risk factors with persistent symptoms, will order cta for ro aortic pathology.

## 2024-03-23 NOTE — ED PROVIDER NOTE - NSFOLLOWUPINSTRUCTIONS_ED_ALL_ED_FT
You were seen in the Emergency Department for chest pain. You had blood work including two negative cardiac enzymes, a normal chest XRAY and a CT scan of your chest/abdomen/pelvis which showed small pulmonary nodules which you should discuss with your primary physician. Please take tylenol as needed for your pain. Continue your aspirin and brilinta as prescribed. Please return for worsening symptoms, chest pain, lightheadedness, dizziness. Otherwise, please follow up with your primary physician and cardiologist.     I hope you feel better soon!    Sincerely,  Barrett Clay MD

## 2024-03-23 NOTE — ED PROVIDER NOTE - PHYSICAL EXAMINATION
general: Well appearing, in no acute distress  HEENT: Normocephalic, atraumatic, extraocular movements intact  CV: Regular rate  Pulm: No respiratory distress, no tachypnea  Abd: Flat, no gross distension  Ext: warm and well perfused, no le edema, no calf tenderness, 2+ radial pulses bilaterally  Skin: No gross rashes or lesions  Neuro: Alert and oriented, moving all extremities

## 2024-04-03 RX ORDER — ASPIRIN ENTERIC COATED TABLETS 81 MG 81 MG/1
81 TABLET, DELAYED RELEASE ORAL DAILY
Qty: 90 | Refills: 3 | Status: ACTIVE | COMMUNITY
Start: 1900-01-01 | End: 1900-01-01

## 2024-04-03 RX ORDER — METOPROLOL TARTRATE 25 MG/1
25 TABLET, FILM COATED ORAL
Qty: 180 | Refills: 3 | Status: ACTIVE | COMMUNITY
Start: 1900-01-01 | End: 1900-01-01

## 2024-04-03 RX ORDER — ATORVASTATIN CALCIUM 80 MG/1
80 TABLET, FILM COATED ORAL DAILY
Qty: 90 | Refills: 1 | Status: ACTIVE | COMMUNITY
Start: 2024-04-03 | End: 1900-01-01

## 2024-04-03 RX ORDER — SACUBITRIL AND VALSARTAN 24; 26 MG/1; MG/1
24-26 TABLET, FILM COATED ORAL TWICE DAILY
Qty: 180 | Refills: 2 | Status: ACTIVE | COMMUNITY
Start: 1900-01-01 | End: 1900-01-01

## 2024-05-02 ENCOUNTER — APPOINTMENT (OUTPATIENT)
Dept: HEART AND VASCULAR | Facility: CLINIC | Age: 44
End: 2024-05-02
Payer: MEDICAID

## 2024-05-02 VITALS
HEART RATE: 83 BPM | WEIGHT: 279 LBS | OXYGEN SATURATION: 98 % | TEMPERATURE: 97.7 F | SYSTOLIC BLOOD PRESSURE: 128 MMHG | HEIGHT: 65 IN | BODY MASS INDEX: 46.48 KG/M2 | DIASTOLIC BLOOD PRESSURE: 81 MMHG

## 2024-05-02 PROCEDURE — G2211 COMPLEX E/M VISIT ADD ON: CPT | Mod: NC,1L

## 2024-05-02 PROCEDURE — 99214 OFFICE O/P EST MOD 30 MIN: CPT

## 2024-05-02 RX ORDER — GABAPENTIN 100 MG/1
CAPSULE ORAL
Refills: 0 | Status: ACTIVE | COMMUNITY

## 2024-05-02 NOTE — HISTORY OF PRESENT ILLNESS
[FreeTextEntry1] : This is a 42 y/o F w/ PMH SLE (on plaquinel), HTN, anemia, uterine fibroid with recent hospitalization for STEMI s/p PCI with stent placed in pLAD due to 100% occlusion and newly diagnosed HFrEF (30-35%--> 40%) in the setting of MI who is presenting for follow up. Since last visit, she has been feeling well. She has been doing cardiac rehab twice a week without issues. Had recent ER visit for chest pain. Was ruled out for dissection. Since then, no further episodes of chest pain. Denies exertional symptoms. Overall, she has been getting around more. She tries to be active. She uses a cane due to spinal stenosis. Denies peripheral edema, orthopnea, PND.

## 2024-05-02 NOTE — ASSESSMENT
[FreeTextEntry1] : This is a 42 y/o F w/ PMH SLE (on plaquinel), HTN, anemia, uterine fibroid with recent hospitalization for STEMI s/p PCI with stent placed in pLAD due to 100% occlusion and newly diagnosed HFrEF (30-35%--> 40%) in the setting of MI who is presenting for follow up.   #STEMI  c/w ASA and Brillinta c/w Lipitor 80mg  LDL goal <70. Repeat lipid panel today .  02/2024 May need Zetia c/w cardiac rehab   # HFrEF with improvement in EF Taken off Eliquis last visit as no thrombus seen  c/w Entresto 24/26mg BID, Lopressor 25mg BID, nikolai 25mg qd Will check BMP today and start Farxiga or Jardiance   #SLE c/w Plaquenil   RTC in 2 months

## 2024-05-02 NOTE — PHYSICAL EXAM
[Well Developed] : well developed [Normal Conjunctiva] : normal conjunctiva [Normal Venous Pressure] : normal venous pressure [Normal S1, S2] : normal S1, S2 [Clear Lung Fields] : clear lung fields [Soft] : abdomen soft [Non Tender] : non-tender [No Edema] : no edema

## 2024-05-09 LAB
ANION GAP SERPL CALC-SCNC: 11 MMOL/L
BUN SERPL-MCNC: 12 MG/DL
CALCIUM SERPL-MCNC: 9.2 MG/DL
CHLORIDE SERPL-SCNC: 104 MMOL/L
CHOLEST SERPL-MCNC: 117 MG/DL
CO2 SERPL-SCNC: 21 MMOL/L
CREAT SERPL-MCNC: 0.88 MG/DL
EGFR: 84 ML/MIN/1.73M2
GLUCOSE SERPL-MCNC: 91 MG/DL
HDLC SERPL-MCNC: 55 MG/DL
LDLC SERPL CALC-MCNC: 48 MG/DL
NONHDLC SERPL-MCNC: 63 MG/DL
POTASSIUM SERPL-SCNC: 4.1 MMOL/L
SODIUM SERPL-SCNC: 137 MMOL/L
TRIGL SERPL-MCNC: 73 MG/DL

## 2024-05-16 RX ORDER — DAPAGLIFLOZIN 10 MG/1
10 TABLET, FILM COATED ORAL
Qty: 30 | Refills: 3 | Status: ACTIVE | COMMUNITY
Start: 2024-05-09 | End: 1900-01-01

## 2024-05-17 ENCOUNTER — TRANSCRIPTION ENCOUNTER (OUTPATIENT)
Age: 44
End: 2024-05-17

## 2024-05-21 ENCOUNTER — TRANSCRIPTION ENCOUNTER (OUTPATIENT)
Age: 44
End: 2024-05-21

## 2024-07-01 ENCOUNTER — NON-APPOINTMENT (OUTPATIENT)
Age: 44
End: 2024-07-01

## 2024-07-01 ENCOUNTER — APPOINTMENT (OUTPATIENT)
Dept: HEART AND VASCULAR | Facility: CLINIC | Age: 44
End: 2024-07-01
Payer: MEDICAID

## 2024-07-01 VITALS
DIASTOLIC BLOOD PRESSURE: 70 MMHG | OXYGEN SATURATION: 99 % | BODY MASS INDEX: 45.98 KG/M2 | WEIGHT: 276 LBS | TEMPERATURE: 97.7 F | SYSTOLIC BLOOD PRESSURE: 110 MMHG | HEIGHT: 65 IN | HEART RATE: 69 BPM

## 2024-07-01 DIAGNOSIS — I10 ESSENTIAL (PRIMARY) HYPERTENSION: ICD-10-CM

## 2024-07-01 DIAGNOSIS — I21.3 ST ELEVATION (STEMI) MYOCARDIAL INFARCTION OF UNSPECIFIED SITE: ICD-10-CM

## 2024-07-01 DIAGNOSIS — I25.10 ATHEROSCLEROTIC HEART DISEASE OF NATIVE CORONARY ARTERY W/OUT ANGINA PECTORIS: ICD-10-CM

## 2024-07-01 DIAGNOSIS — E78.5 HYPERLIPIDEMIA, UNSPECIFIED: ICD-10-CM

## 2024-07-01 DIAGNOSIS — Z95.5 PRESENCE OF CORONARY ANGIOPLASTY IMPLANT AND GRAFT: ICD-10-CM

## 2024-07-01 PROCEDURE — 93000 ELECTROCARDIOGRAM COMPLETE: CPT

## 2024-07-01 PROCEDURE — 99214 OFFICE O/P EST MOD 30 MIN: CPT | Mod: 25

## 2024-07-01 PROCEDURE — G2211 COMPLEX E/M VISIT ADD ON: CPT | Mod: NC,1L

## 2024-07-01 RX ORDER — UBIDECARENONE/VIT E ACET 100MG-5
CAPSULE ORAL
Refills: 0 | Status: ACTIVE | COMMUNITY

## 2024-07-02 LAB
ANION GAP SERPL CALC-SCNC: 8 MMOL/L
BUN SERPL-MCNC: 8 MG/DL
CALCIUM SERPL-MCNC: 9.2 MG/DL
CHLORIDE SERPL-SCNC: 105 MMOL/L
CO2 SERPL-SCNC: 24 MMOL/L
CREAT SERPL-MCNC: 0.88 MG/DL
EGFR: 84 ML/MIN/1.73M2
GLUCOSE SERPL-MCNC: 129 MG/DL
POTASSIUM SERPL-SCNC: 4.3 MMOL/L
SODIUM SERPL-SCNC: 138 MMOL/L

## 2024-07-16 ENCOUNTER — INPATIENT (INPATIENT)
Facility: HOSPITAL | Age: 44
LOS: 1 days | Discharge: HOME | End: 2024-07-18
Attending: INTERNAL MEDICINE | Admitting: INTERNAL MEDICINE
Payer: COMMERCIAL

## 2024-07-16 VITALS
TEMPERATURE: 99 F | WEIGHT: 274.92 LBS | HEIGHT: 67 IN | HEART RATE: 57 BPM | RESPIRATION RATE: 16 BRPM | SYSTOLIC BLOOD PRESSURE: 106 MMHG | OXYGEN SATURATION: 95 % | DIASTOLIC BLOOD PRESSURE: 71 MMHG

## 2024-07-16 DIAGNOSIS — I25.10 ATHEROSCLEROTIC HEART DISEASE OF NATIVE CORONARY ARTERY WITHOUT ANGINA PECTORIS: ICD-10-CM

## 2024-07-16 DIAGNOSIS — I50.22 CHRONIC SYSTOLIC (CONGESTIVE) HEART FAILURE: ICD-10-CM

## 2024-07-16 DIAGNOSIS — I20.9 ANGINA PECTORIS, UNSPECIFIED: ICD-10-CM

## 2024-07-16 DIAGNOSIS — I10 ESSENTIAL (PRIMARY) HYPERTENSION: ICD-10-CM

## 2024-07-16 DIAGNOSIS — M32.9 SYSTEMIC LUPUS ERYTHEMATOSUS, UNSPECIFIED: ICD-10-CM

## 2024-07-16 DIAGNOSIS — Z78.9 OTHER SPECIFIED HEALTH STATUS: ICD-10-CM

## 2024-07-16 DIAGNOSIS — Z98.890 OTHER SPECIFIED POSTPROCEDURAL STATES: Chronic | ICD-10-CM

## 2024-07-16 DIAGNOSIS — E78.5 HYPERLIPIDEMIA, UNSPECIFIED: ICD-10-CM

## 2024-07-16 DIAGNOSIS — D64.9 ANEMIA, UNSPECIFIED: ICD-10-CM

## 2024-07-16 DIAGNOSIS — I50.20 UNSPECIFIED SYSTOLIC (CONGESTIVE) HEART FAILURE: ICD-10-CM

## 2024-07-16 LAB — TROPONIN T, HIGH SENSITIVITY RESULT: 6 NG/L — SIGNIFICANT CHANGE UP (ref 0–51)

## 2024-07-16 PROCEDURE — 93010 ELECTROCARDIOGRAM REPORT: CPT

## 2024-07-16 PROCEDURE — 71045 X-RAY EXAM CHEST 1 VIEW: CPT | Mod: 26

## 2024-07-16 PROCEDURE — 99223 1ST HOSP IP/OBS HIGH 75: CPT

## 2024-07-16 PROCEDURE — 99285 EMERGENCY DEPT VISIT HI MDM: CPT

## 2024-07-16 RX ORDER — SPIRONOLACTONE 25 MG/1
25 TABLET ORAL DAILY
Refills: 0 | Status: DISCONTINUED | OUTPATIENT
Start: 2024-07-16 | End: 2024-07-18

## 2024-07-16 RX ORDER — IPRATROPIUM BROMIDE 0.5 MG/2.5ML
500 SOLUTION RESPIRATORY (INHALATION) EVERY 6 HOURS
Refills: 0 | Status: DISCONTINUED | OUTPATIENT
Start: 2024-07-16 | End: 2024-07-18

## 2024-07-16 RX ORDER — ENOXAPARIN SODIUM 100 MG/ML
40 INJECTION SUBCUTANEOUS EVERY 12 HOURS
Refills: 0 | Status: DISCONTINUED | OUTPATIENT
Start: 2024-07-17 | End: 2024-07-17

## 2024-07-16 RX ORDER — DAPAGLIFLOZIN 10 MG/1
1 TABLET, FILM COATED ORAL
Refills: 0 | DISCHARGE

## 2024-07-16 RX ORDER — ACETAMINOPHEN 325 MG
650 TABLET ORAL EVERY 6 HOURS
Refills: 0 | Status: DISCONTINUED | OUTPATIENT
Start: 2024-07-16 | End: 2024-07-18

## 2024-07-16 RX ORDER — SACUBITRIL AND VALSARTAN 97; 103 MG/1; MG/1
1 TABLET, FILM COATED ORAL
Refills: 0 | DISCHARGE

## 2024-07-16 RX ORDER — ATORVASTATIN CALCIUM 20 MG/1
80 TABLET, FILM COATED ORAL AT BEDTIME
Refills: 0 | Status: DISCONTINUED | OUTPATIENT
Start: 2024-07-16 | End: 2024-07-18

## 2024-07-16 RX ORDER — SACUBITRIL AND VALSARTAN 97; 103 MG/1; MG/1
1 TABLET, FILM COATED ORAL EVERY 12 HOURS
Refills: 0 | Status: DISCONTINUED | OUTPATIENT
Start: 2024-07-16 | End: 2024-07-18

## 2024-07-16 RX ORDER — TICAGRELOR 90 MG/1
90 TABLET ORAL EVERY 12 HOURS
Refills: 0 | Status: DISCONTINUED | OUTPATIENT
Start: 2024-07-17 | End: 2024-07-18

## 2024-07-16 RX ORDER — SPIRONOLACTONE 25 MG/1
1 TABLET ORAL
Refills: 0 | DISCHARGE

## 2024-07-16 RX ORDER — ASPIRIN 325 MG/1
81 TABLET, FILM COATED ORAL DAILY
Refills: 0 | Status: DISCONTINUED | OUTPATIENT
Start: 2024-07-16 | End: 2024-07-18

## 2024-07-16 RX ORDER — METOPROLOL TARTRATE 50 MG
25 TABLET ORAL EVERY 12 HOURS
Refills: 0 | Status: DISCONTINUED | OUTPATIENT
Start: 2024-07-16 | End: 2024-07-18

## 2024-07-16 RX ORDER — DAPAGLIFLOZIN 10 MG/1
10 TABLET, FILM COATED ORAL DAILY
Refills: 0 | Status: DISCONTINUED | OUTPATIENT
Start: 2024-07-16 | End: 2024-07-18

## 2024-07-16 RX ORDER — NITROGLYCERIN 2.5 MG
0.4 CAPSULE, EXTENDED RELEASE ORAL ONCE
Refills: 0 | Status: COMPLETED | OUTPATIENT
Start: 2024-07-16 | End: 2024-07-16

## 2024-07-16 RX ADMIN — Medication 0.4 MILLIGRAM(S): at 22:43

## 2024-07-16 NOTE — H&P ADULT - ASSESSMENT
44 y/o Female  w/ PMHx  SLE (on plaquinel), HTN, anemia, heavy menstrual cycles (saturated 12-14 tampons per 24 hours, OBGYN recommended Mirena IUD), uterine fibroid, L ovary cyst, spinal stenosis, and hx STEMI CAD s/p [facility] 2/2/24 @[facility] s/p PCI  LI pLAD 100% occlusion via RRA (on ASA, Brilinta), HFrEF  TTE post cath EF 30-35% severe hypokinesis  2/2 MI (started GDMT Entresto 24-26mg bid, Metoprolol 25mg bid, Farxiga 10mg daily and Spironolactone 25mg daily)  presents to [facility] ER this evening, 7/16/24, complaining of intermittent, left-sided chest pressure radiating to left shoulder associated with nausea after ambulating           , symptoms approves with rest.   Patient admitted to [facility] for stable Angina.   44 y/o obese Female  w/ PMHx  SLE (on plaquinel), HTN, anemia, heavy menstrual cycles (saturated 12-14 tampons per 24 hours, OBGYN recommended Mirena IUD), uterine fibroid, L ovary cyst, spinal stenosis, and hx STEMI CAD s/p [facility] 2/2/24 @[facility] s/p PCI  LI pLAD 100% occlusion via RRA (on ASA, Brilinta), HFrEF  TTE post cath EF 30-35% severe hypokinesis  2/2 MI (started GDMT Entresto 24-26mg bid, Metoprolol 25mg bid, Farxiga 10mg daily and Spironolactone 25mg daily)  presents to [facility] ER this evening, 7/16/24, complaining of intermittent, left-sided chest pressure radiating to left shoulder associated with nausea after ambulating           , symptoms approves with rest.   Patient admitted to [facility] for stable Angina.   44 y/o obese Female, who ambulates with cane,  w/ PMHx  SLE (on plaquinel), HTN, anemia (baseline Hgb 10), heavy menstrual cycles (saturated 12-14 tampons per 24 hours, OBGYN recommended Mirena IUD), uterine fibroid, L ovary cyst, spinal stenosis, and hx STEMI CAD s/p C 2/2/24 @[facility] s/p PCI  LI pLAD 100% occlusion via RRA (on ASA, Brilinta), HFrEF  TTE post cath EF 30-35% severe hypokinesis  2/2 MI (started GDMT Entresto 24-26mg bid, Metoprolol 25mg bid, Farxiga 10mg daily and Spironolactone 25mg daily)  presents to [facility] ER this evening, 7/16/24, complaining of intermittent, left-sided chest pressure radiating to left shoulder associated with nausea after ambulating           , symptoms approves with rest.   Patient admitted to [facility] for stable Angina.   44 y/o obese Female, who ambulates with cane,  w/ PMHx  SLE (on plaquinel), HTN, anemia (baseline Hgb 10), heavy menstrual cycles (saturated 12-14 tampons per 24 hours, OBGYN recommended Mirena IUD), uterine fibroid, L ovary cyst, spinal stenosis, and hx STEMI CAD s/p C 2/2/24 @[facility] s/p PCI  LI pLAD 100% occlusion via RRA (on ASA, Brilinta), HFrEF  TTE post cath EF 30-35% severe hypokinesis  2/2 MI (started GDMT Entresto 24-26mg bid, Metoprolol 25mg bid, Farxiga 10mg daily and Spironolactone 25mg daily)  presents to [facility] ER this evening, 7/16/24, complaining of intermittent, left-sided chest pressure radiating to left shoulder associated with nausea after ambulating           , symptoms approves with rest.   Patient admitted to [facility] for Unstable Angina.

## 2024-07-16 NOTE — H&P ADULT - NS ATTEND AMEND GEN_ALL_CORE FT
42 y/o obese Female, who ambulates with cane,  w/ PMHx  SLE (on plaquinel), HTN, anemia (baseline Hgb 10), heavy menstrual cycles (saturated 12-14 tampons per 24 hours, OBGYN recommended Mirena IUD), uterine fibroid, L ovary cyst, spinal stenosis, and hx STEMI CAD s/p C 2/2/24 @[facility] s/p PCI  LI pLAD 100% occlusion via RRA (on ASA, Brilinta), HFrEF  TTE post cath EF 30-35% severe hypokinesis  2/2 MI (started GDMT Entresto 24-26mg bid, Metoprolol 25mg bid, Farxiga 10mg daily and Spironolactone 25mg daily)  presents to [facility] ER this evening, 7/16/24, complaining of intermittent, left-sided chest pressure radiating to left shoulder associated with nausea after ambulating           , symptoms approves with rest.   Patient admitted to [facility] for Unstable Angina.    1. atypical chest pain  Known CAD, recent PCI LAD, negative troponin, Hb low due to menstrual cycle. EKG without STEMI.  Nuclear stress test for further evaluation  echo.    During non face-to-face time, I reviewed relevant portions of the patient’s medical record; including vitals, labs, medications, cardiac studies, remaining additional imaging and consultant recommendations. During face-to-face time, I took a relevant history and examined the patient. I also explained to the patient their diagnoses, and specific cardiopulmonary management plan, which required a high level of medical decision making.  I answered all questions related to the patient's medical conditions. I spent 80  minutes on total encounter; more than 50% of the visit was spent counseling and/or coordinating care by the attending physician, with plan of care discussed with the patient,  and cardiac care team.

## 2024-07-16 NOTE — H&P ADULT - NSHPLABSRESULTS_GEN_ALL_CORE
9.2    3.94  )-----------( 247      ( 16 Jul 2024 20:29 )             30.0       07-16    137  |  105  |  12  ----------------------------<  92  4.1   |  22  |  0.88    Ca    9.1      16 Jul 2024 20:29    TPro  8.4<H>  /  Alb  4.2  /  TBili  0.4  /  DBili  x   /  AST  27  /  ALT  20  /  AlkPhos  80  07-16                Urinalysis Basic - ( 16 Jul 2024 20:29 )    Color: x / Appearance: x / SG: x / pH: x  Gluc: 92 mg/dL / Ketone: x  / Bili: x / Urobili: x   Blood: x / Protein: x / Nitrite: x   Leuk Esterase: x / RBC: x / WBC x   Sq Epi: x / Non Sq Epi: x / Bacteria: x        EKG: Sinus Cedric HR@59bpm with non specific ST-T wave changes

## 2024-07-16 NOTE — H&P ADULT - NSHPPHYSICALEXAM_GEN_ALL_CORE
T(C): 37.2 (07-16-24 @ 20:07), Max: 37.2 (07-16-24 @ 20:07)  HR: 69 (07-16-24 @ 22:57) (57 - 69)  BP: 104/69 (07-16-24 @ 22:57) (104/69 - 116/74)  RR: 16 (07-16-24 @ 20:07) (16 - 16)  SpO2: 95% (07-16-24 @ 20:07) (95% - 95%)  Wt(kg): --    Appearance: NAD  HEENT:   Normal oral mucosa, EOMI	  Neck: Supple,  - JVD; No Carotid Bruit and 2+ pulses B/L  Cardiovascular: Normal S1 S2, No murmurs  Respiratory: Lungs clear to auscultation/No Rales, Rhonchi, Wheezing	  Gastrointestinal:  Soft, Non-tender, + BS	  Skin: No rashes, No ecchymoses, No cyanosis  Extremities: Normal range of motion, No clubbing, cyanosis or edema  Vascular: Femoral pulses 2+ b/l without bruit, DP 2+ b/l, PT 1+ b/l  Neurologic: Non-focal  Psychiatry: A & O x 4, Mood & affect appropriate

## 2024-07-16 NOTE — ED ADULT TRIAGE NOTE - CHIEF COMPLAINT QUOTE
Chest pain that started today at 6pm while walking. has hx of MI with stents in feb, htn, hld and lupus. was given 324 of asa by ems, has [id number]

## 2024-07-16 NOTE — H&P ADULT - NSHPSOURCEINFOTX_GEN_ALL_CORE
Triage Chief Complaint:   No chief complaint on file.    [place]:  Today in the ED I had the pleasure of caring for Casi Bullock who is a 21 y.o. female that presents today to the ED for evaluation for anxiety. Sob when she walks and talks as well as tightness of the chest. She does have hx of anxiety, she takes zoloft.  Was seen at local ED and had negative workup including cta pulm. She was rx vistaril. 2 days later she will get sob, and tachycardia and burning in the chest, she states that her hr got up to 145. .    ROS:  REVIEW OF SYSTEMS    At least 10 systems reviewed      All other review of systems are negative  See HPI and nursing notes for additional information       Past Medical History:   Diagnosis Date    ADD (attention deficit disorder)     Anemia     Anxiety     Bipolar 1 disorder (HCC)     Depression     Dysmenorrhea     Exposure to STD     Infertility counseling     Insomnia     Menorrhagia     Obesity     OCD (obsessive compulsive disorder)     Panic attack      Past Surgical History:   Procedure Laterality Date    EYE SURGERY      2005 tear duct    TEAR DUCT SURGERY       Family History   Problem Relation Age of Onset    Hypertension Paternal Grandfather     Hypertension Maternal Grandmother     Diabetes Maternal Grandmother     Hypothyroidism Maternal Grandmother     Hypertension Other     Cancer Mother         cervical cancer    Cancer Sister         cervical cancer     Social History     Socioeconomic History    Marital status: Single     Spouse name: Not on file    Number of children: Not on file    Years of education: Not on file    Highest education level: Not on file   Occupational History    Not on file   Tobacco Use    Smoking status: Never    Smokeless tobacco: Never   Vaping Use    Vaping Use: Never used   Substance and Sexual Activity    Alcohol use: Not Currently     Alcohol/week: 20.0 - 25.0 standard drinks of alcohol     Types: 20 - 25 Shots of liquor per week     Comment: \"when  Emergency Contact: Kotajack Sauerlesley [phone]

## 2024-07-16 NOTE — ED ADULT NURSE NOTE - NSFALLHARMRISKINTERV_ED_ALL_ED

## 2024-07-16 NOTE — H&P ADULT - PROBLEM SELECTOR PLAN 2
hx STEMI s/ps/p [facility] 2/2/24 @[facility] s/p PCI  LI pLAD 100% occlusion via RRA (on ASA, Brilinta) hx STEMI s/ps/p [facility] 2/2/24 @[facility] s/p PCI  LI pLAD 100% occlusion via RRA (on ASA, Brilinta) IVUS guided  LM nl, mild diffuse disease thourghout-  D1, LCx, OM1, RCA, RPDA. LVEDP 10 mmHg   -continue ASA Ec 81mg daily and Brilinta 90mg bid, Atorvastatin 80mg daily  -NPO for further cardiac workup; CCTA vs NST  -TTE in AM hx STEMI s/ps/p [facility] 2/2/24 @[facility] s/p PCI  LI pLAD 100% occlusion via RRA (on ASA, Brilinta) IVUS guided  LM nl, mild diffuse disease thourghout-  D1, LCx, OM1, RCA, RPDA. LVEDP 10 mmHg   -continue ASA Ec 81mg daily and Brilinta 90mg bid, Atorvastatin 80mg daily, Metoprolol tart 25mg bid  -NPO for further cardiac workup; CCTA vs NST  -TTE in AM

## 2024-07-16 NOTE — ED PROVIDER NOTE - CLINICAL SUMMARY MEDICAL DECISION MAKING FREE TEXT BOX
44 year old female with history of SLE (on plaquinel), HTN, CAD s/p stent (STEMI here in feb), uterine fibroid, L ovary cyst, spinal stenosis, presenting with chest pain x 1d. 44 year old female with history of SLE (on plaquinel), HTN, CAD s/p stent (STEMI here in feb), uterine fibroid, L ovary cyst, spinal stenosis, presenting with chest pain x 1d. Overall nontoxic here with good vitals and nonischemic sinus EKG, does report active L sided chest pain however. Given exertional nature and hx of STEMI leading to pLAD stent for 100% occlusion would have low threshold to admit for further ischemic eval. Labs to include troponin and CXR pending. Will trial SL nitro dose.

## 2024-07-16 NOTE — ED PROVIDER NOTE - PHYSICAL EXAMINATION
Gen - NAD; well-appearing; A+Ox3   HEENT - NCAT, EOMI, moist mucous membranes, clear oropharynx  Neck - supple  Resp - CTAB, no increased WOB  CV -  RRR, no m/r/g  Abd - soft, NT, ND; no guarding or rebound  Back - no CVA tenderness  MSK - FROM of b/l UE and LE, no gross deformities  Extrem - no LE edema/erythema/tenderness, palpable equal distal pulses  Neuro - no focal motor or sensation deficits  Skin - warm, well perfused

## 2024-07-16 NOTE — H&P ADULT - PROBLEM SELECTOR PROBLEM 7
Patient left  requesting a call back.    Writer returned call to patient who reports that she recently saw Dr. Granda who believes her stomach issues are related to irritable bowel however will not be able to treat that until her anxiety/irritability is better controlled.    Writer discuss current status with patient who reports initially she thought Wellbutrin 150mg kicked her symptoms, however doesn't know if its really helping.  Patient states she doesn't know why she is like this and finds that she worries about everything.  Patient does report raising 2 kids alone and having a stressful job.    Patient agreeable to scheduling a follow up appointment to discuss irritability/anxiety and to determine if medication adjustments are needed.  Patient agreeable to seeing Faith GARCIA on 7/12/2017.    Routed to Faith GARCIA - HERNANDEZ.     Anemia

## 2024-07-16 NOTE — H&P ADULT - PROBLEM SELECTOR PLAN 3
HFrEF  TTE post cath EF 30-35% severe hypokinesis  2/2 MI (started GDMT Entresto 24-26mg bid, Metoprolol 25mg bid, Farxiga 10mg daily and Spironolactone 25mg daily)    -continue Core Measures (daily weights and strict I&O q4hrs w/VSS) Pt. appears euvolemic   -TTE in AM

## 2024-07-16 NOTE — H&P ADULT - PROBLEM SELECTOR PLAN 1
Telemetry, ECG ( Sinus Cedric HR@59bpm with non specific ST-T wave changes), serial CE, (Troponin T High Sensitivity 6-->6)   -CE w/ CK; CKMB in AM  -hx of CAD  s/p [facility] 2/2/24 @[facility] s/p PCI  LI pLAD 100% occlusion via RRA (on ASA, Brilinta)  -TTE in AM  -NPO for further cardiac workup; CCTA vs Stress Telemetry, ECG ( Sinus Cedric HR@59bpm with non specific ST-T wave changes), serial CE, (Troponin T High Sensitivity 6-->6)   -CE w/ CK; CKMB in AM  -hx of CAD  s/p [facility] 2/2/24 @[facility] s/p PCI  LI pLAD 100% occlusion via RRA (on ASA, Brilinta)  -TTE in AM  -NPO for further cardiac workup; CCTA vs NST Telemetry, ECG (Sinus Cedric HR@59bpm with non specific ST-T wave changes) Troponin T High Sensitivity neg X2 6-->6  hx STEMI CAD s/p [facility] 2/2/24 @[facility] s/p PCI  LI pLAD 100% occlusion via RRA (on ASA, Brilinta) IVUS guided  LM nl, mild diffuse disease thourghout-  D1, LCx, OM1, RCA, RPDA. LVEDP 10 mmHg   -continue ASA Ec 81mg daily; Brilinta 90mg bid; Atorvastatin 80mg daily, Metoprolol tart 25mg bid  -NPO CCTA vs NST   -TTE AM

## 2024-07-16 NOTE — ED ADULT NURSE NOTE - OBJECTIVE STATEMENT
44 yr old female c/o chest pain. Pt c/o of left sided chest pain. Hx of lupus and MI in Feb. Pt denies SOB, fevers, chills, vomiting. Pt endorses nausea. Respirations spontaneous and unlabored. A&Ox4. NAD. VASQUEZ.

## 2024-07-16 NOTE — H&P ADULT - HISTORY OF PRESENT ILLNESS
A&O X4 Full Code     44 y/o Female  w/ PMHx  SLE (on plaquinel), HTN, anemia, heavy menstrual cycles (saturated 12-14 tampons per 24 hours, OBGYN recommended Mirena IUD), uterine fibroid, L ovary cyst, spinal stenosis, and hx STEMI CAD s/p [facility] 2/2/24 @[facility] s/p PCI  LI pLAD 100% occlusion via RRA (on ASA, Brilinta), HFrEF TTE post cath EF 30-35% severe hypokinesis  2/2 MI (started GDMT Entresto 24-26mg bid, Metoprolol 25mg bid, Farxiga 10mg daily and Spironolactone 25mg daily)  presents to [facility] ER this evening, 7/16/24, complaining of intermittent, left-sided chest pressure radiating to left shoulder associated with nausea after ambulating           , symptoms approves with rest.   Patient admitted to [facility] for stable Angina.    In speaking with patient she reports,                        .      A&O X4 Full Code     42 y/o obese Female  w/ PMHx  SLE (on plaquinel), HTN, anemia, heavy menstrual cycles (saturated 12-14 tampons per 24 hours, OBGYN recommended Mirena IUD), uterine fibroid, L ovary cyst, spinal stenosis, and hx STEMI CAD s/p [facility] 2/2/24 @[facility] s/p PCI  LI pLAD 100% occlusion via RRA (on ASA, Brilinta), HFrEF TTE post cath EF 30-35% severe hypokinesis  2/2 MI (started GDMT Entresto 24-26mg bid, Metoprolol 25mg bid, Farxiga 10mg daily and Spironolactone 25mg daily)  presents to [facility] ER this evening, 7/16/24, complaining of intermittent, left-sided chest pressure radiating to left shoulder associated with nausea after ambulating           , symptoms approves with rest.   Patient admitted to [facility] for stable Angina.    In speaking with patient she reports,                        .      A&O X4 Full Code     42 y/o obese Female, who ambulates with cane  w/ PMHx  SLE (on plaquinel), HTN, anemia (baseline Hgb 10), heavy menstrual cycles (saturated 12-14 tampons per 24 hours, OBGYN recommended Mirena IUD), uterine fibroid, L ovary cyst, spinal stenosis, and hx STEMI CAD s/p [facility] 2/2/24 @[facility] s/p PCI  LI pLAD 100% occlusion via RRA (on ASA, Brilinta), HFrEF TTE post cath EF 30-35% severe hypokinesis  2/2 MI (started GDMT Entresto 24-26mg bid, Metoprolol 25mg bid, Farxiga 10mg daily and Spironolactone 25mg daily)  presents to [facility] ER this evening, 7/16/24, complaining of intermittent, left-sided chest pressure radiating to left shoulder associated with nausea after ambulating           , symptoms approves with rest.   Patient admitted to [facility] for stable Angina.    In speaking with patient she reports,                        .      A&O X4 Full Code     42 y/o obese Female, who ambulates with cane  w/ PMHx  SLE (on plaquinel), HTN, anemia (baseline Hgb 10), heavy menstrual cycles (saturated 12-14 tampons per 24 hours, OBGYN recommended Mirena IUD), uterine fibroid, L ovary cyst, spinal stenosis, and hx STEMI CAD s/p [facility] 2/2/24 @[facility] s/p PCI  LI pLAD 100% occlusion via RRA (on ASA, Brilinta), HFrEF TTE post cath EF 30-35% severe hypokinesis  2/2 MI (started GDMT Entresto 24-26mg bid, Metoprolol 25mg bid, Farxiga 10mg daily and Spironolactone 25mg daily)  presents to [facility] ER this evening, 7/16/24, complaining of intermittent, left-sided chest pressure radiating to left shoulder associated with nausea after ambulating           symptoms approves with rest.   Patient admitted to [facility] for Unstable Angina.    According to the patient, she started feeling pressure on her left side of the chest that extended to her left shoulder and was accompanied by nausea, approximately three hours after her treadmill session during cardiac rehabilitation. She mentioned having lunch and then resting when the symptoms began. She states that she has been diligent with taking her medications and attending follow-up appointments with her cardiologist, Dr. Montgomery. The patient denies fever, chills, headache, cough, shortness of breath, palpitations, dizziness, lightheadedness, abdominal discomfort, and nausea/vomiting/diarrhea.    In ER ECG reveals Sinus Cedric HR@59bpm with non specific ST-T wave changes, Troponin T High Sensitivity 6-->6 neg, H/H 9.2/30.0 Plts 247, BUN/Cr 12/0.88, rest of labs unremarkable.  CXR bedside no acute pathology seen; follow up official report.  Patient was given ASA 324mg PO X1 and Nitro SL 0,4mg X1.    VSS Temp 99F; /71; HR Sinus Cedric 57bpm; RR 16; O2 on RA 95%    .                 .

## 2024-07-16 NOTE — H&P ADULT - NSHPREVIEWOFSYSTEMS_GEN_ALL_CORE
GENERAL, CONSTITUTIONAL : denies recent weight loss, fever, chills  EYES, VISION: denies changes in vision   EARS, NOSE, THROAT: denies hearing loss  HEART, CARDIOVASCULAR: admits to left-sided chest pressure with exertion, relieved at rest denies arrhythmia, palpitations,   RESPIRATORY: Denies cough, SOB, wheezing, PND, orthopnea  GASTROINTESTINAL: admits to nausea w/o nbnb emesis; Denies abdominal pain, heartburn, bloody stool, dark tarry stool  GENITOURINARY: Denies frequent urination, urgency  MUSCULOSKELETAL admits to joint pain or swelling, musculoskeletal pain.   SKIN & INTEGUMENTARY Denies rashes, sores, blisters, blisters, growths.  NEUROLOGICAL: Denies numbness or tingling sensations, sensation loss, burning.   PSYCHIATRIC: Denies nervousness, anxiety, depression  ENDOCRINE Denies heat or cold intolerance, excessive thirst  HEMATOLOGIC/LYMPHATIC: hx of heavy menstrual cycle on Mirena IUD; abnormal bleeding, bleeding of any kind

## 2024-07-16 NOTE — H&P ADULT - NSICDXPASTMEDICALHX_GEN_ALL_CORE_FT
PAST MEDICAL HISTORY:  Anemia     CAD (coronary artery disease)     Chronic systolic congestive heart failure     History of ST elevation myocardial infarction (STEMI)     HTN (hypertension)     Hyperlipidemia     Menorrhagia     Palpitation     SLE (systemic lupus erythematosus)     Spinal stenosis

## 2024-07-16 NOTE — ED PROVIDER NOTE - OBJECTIVE STATEMENT
44 year old female with history of SLE (on plaquinel), HTN, CAD s/p stent (STEMI here in feb), uterine fibroid, L ovary cyst, spinal stenosis, presenting with chest pain x 1d. 44 year old female with history of SLE (on plaquinel), HTN, CAD s/p stent (STEMI here in feb), uterine fibroid, L ovary cyst, spinal stenosis, presenting with chest pain x 1d. States having exertional L sided chest pain radiating to the shoulder associated with nausea, improves with rest. Feels somewhat like the symptoms that she had in february here when found to have pLAD occlusion. Denies fever, chills, cough, sob, ab pain, vomiting, diarrhea, leg swelling. Sees Dr. Abbott.

## 2024-07-17 ENCOUNTER — RESULT REVIEW (OUTPATIENT)
Age: 44
End: 2024-07-17

## 2024-07-17 DIAGNOSIS — I20.0 UNSTABLE ANGINA: ICD-10-CM

## 2024-07-17 LAB
ADD ON TEST-SPECIMEN IN LAB: SIGNIFICANT CHANGE UP
ALBUMIN SERPL ELPH-MCNC: 3.8 G/DL — SIGNIFICANT CHANGE UP (ref 3.3–5)
ALP SERPL-CCNC: 77 U/L — SIGNIFICANT CHANGE UP (ref 40–120)
ALT FLD-CCNC: 17 U/L — SIGNIFICANT CHANGE UP (ref 10–45)
ANION GAP SERPL CALC-SCNC: 10 MMOL/L — SIGNIFICANT CHANGE UP (ref 5–17)
ANISOCYTOSIS BLD QL: SLIGHT — SIGNIFICANT CHANGE UP
APTT BLD: 28.5 SEC — SIGNIFICANT CHANGE UP (ref 24.5–35.6)
AST SERPL-CCNC: 21 U/L — SIGNIFICANT CHANGE UP (ref 10–40)
BASOPHILS # BLD AUTO: 0.04 K/UL — SIGNIFICANT CHANGE UP (ref 0–0.2)
BASOPHILS NFR BLD AUTO: 0.9 % — SIGNIFICANT CHANGE UP (ref 0–2)
BILIRUB DIRECT SERPL-MCNC: <0.2 MG/DL — SIGNIFICANT CHANGE UP (ref 0–0.3)
BILIRUB INDIRECT FLD-MCNC: SIGNIFICANT CHANGE UP (ref 0.2–1)
BILIRUB SERPL-MCNC: 0.5 MG/DL — SIGNIFICANT CHANGE UP (ref 0.2–1.2)
BUN SERPL-MCNC: 12 MG/DL — SIGNIFICANT CHANGE UP (ref 7–23)
BURR CELLS BLD QL SMEAR: PRESENT — SIGNIFICANT CHANGE UP
CALCIUM SERPL-MCNC: 9 MG/DL — SIGNIFICANT CHANGE UP (ref 8.4–10.5)
CHLORIDE SERPL-SCNC: 107 MMOL/L — SIGNIFICANT CHANGE UP (ref 96–108)
CHOLEST SERPL-MCNC: 123 MG/DL — SIGNIFICANT CHANGE UP
CK MB CFR SERPL CALC: 1.5 NG/ML — SIGNIFICANT CHANGE UP (ref 0–6.7)
CK SERPL-CCNC: 109 U/L — SIGNIFICANT CHANGE UP (ref 25–170)
CO2 SERPL-SCNC: 20 MMOL/L — LOW (ref 22–31)
CREAT SERPL-MCNC: 0.84 MG/DL — SIGNIFICANT CHANGE UP (ref 0.5–1.3)
CRP SERPL-MCNC: <3 MG/L — SIGNIFICANT CHANGE UP (ref 0–4)
DACRYOCYTES BLD QL SMEAR: SLIGHT — SIGNIFICANT CHANGE UP
EGFR: 88 ML/MIN/1.73M2 — SIGNIFICANT CHANGE UP
EOSINOPHIL # BLD AUTO: 0.07 K/UL — SIGNIFICANT CHANGE UP (ref 0–0.5)
EOSINOPHIL NFR BLD AUTO: 1.8 % — SIGNIFICANT CHANGE UP (ref 0–6)
ERYTHROCYTE [SEDIMENTATION RATE] IN BLOOD: 18 MM/HR — HIGH
GIANT PLATELETS BLD QL SMEAR: PRESENT — SIGNIFICANT CHANGE UP
GLUCOSE SERPL-MCNC: 101 MG/DL — HIGH (ref 70–99)
HCG UR QL: NEGATIVE — SIGNIFICANT CHANGE UP
HCT VFR BLD CALC: 30.3 % — LOW (ref 34.5–45)
HDLC SERPL-MCNC: 53 MG/DL — SIGNIFICANT CHANGE UP
HGB BLD-MCNC: 9.3 G/DL — LOW (ref 11.5–15.5)
HYPOCHROMIA BLD QL: SIGNIFICANT CHANGE UP
INR BLD: 1.08 — SIGNIFICANT CHANGE UP (ref 0.85–1.18)
LIPID PNL WITH DIRECT LDL SERPL: 56 MG/DL — SIGNIFICANT CHANGE UP
LYMPHOCYTES # BLD AUTO: 1.58 K/UL — SIGNIFICANT CHANGE UP (ref 1–3.3)
LYMPHOCYTES # BLD AUTO: 40 % — SIGNIFICANT CHANGE UP (ref 13–44)
MACROCYTES BLD QL: SLIGHT — SIGNIFICANT CHANGE UP
MAGNESIUM SERPL-MCNC: 1.8 MG/DL — SIGNIFICANT CHANGE UP (ref 1.6–2.6)
MANUAL SMEAR VERIFICATION: SIGNIFICANT CHANGE UP
MCHC RBC-ENTMCNC: 22.2 PG — LOW (ref 27–34)
MCHC RBC-ENTMCNC: 30.7 GM/DL — LOW (ref 32–36)
MCV RBC AUTO: 72.5 FL — LOW (ref 80–100)
MONOCYTES # BLD AUTO: 0.65 K/UL — SIGNIFICANT CHANGE UP (ref 0–0.9)
MONOCYTES NFR BLD AUTO: 16.4 % — HIGH (ref 2–14)
NEUTROPHILS # BLD AUTO: 1.55 K/UL — LOW (ref 1.8–7.4)
NEUTROPHILS NFR BLD AUTO: 39.1 % — LOW (ref 43–77)
NON HDL CHOLESTEROL: 70 MG/DL — SIGNIFICANT CHANGE UP
NT-PROBNP SERPL-SCNC: 283 PG/ML — SIGNIFICANT CHANGE UP (ref 0–300)
OVALOCYTES BLD QL SMEAR: SIGNIFICANT CHANGE UP
PLAT MORPH BLD: ABNORMAL
PLATELET # BLD AUTO: 221 K/UL — SIGNIFICANT CHANGE UP (ref 150–400)
POIKILOCYTOSIS BLD QL AUTO: SIGNIFICANT CHANGE UP
POLYCHROMASIA BLD QL SMEAR: SLIGHT — SIGNIFICANT CHANGE UP
POTASSIUM SERPL-MCNC: 3.5 MMOL/L — SIGNIFICANT CHANGE UP (ref 3.5–5.3)
POTASSIUM SERPL-SCNC: 3.5 MMOL/L — SIGNIFICANT CHANGE UP (ref 3.5–5.3)
PROT SERPL-MCNC: 8.2 G/DL — SIGNIFICANT CHANGE UP (ref 6–8.3)
PROTHROM AB SERPL-ACNC: 12.3 SEC — SIGNIFICANT CHANGE UP (ref 9.5–13)
RBC # BLD: 4.18 M/UL — SIGNIFICANT CHANGE UP (ref 3.8–5.2)
RBC # FLD: 16.3 % — HIGH (ref 10.3–14.5)
RBC BLD AUTO: ABNORMAL
SMUDGE CELLS # BLD: PRESENT — SIGNIFICANT CHANGE UP
SODIUM SERPL-SCNC: 137 MMOL/L — SIGNIFICANT CHANGE UP (ref 135–145)
TRIGL SERPL-MCNC: 64 MG/DL — SIGNIFICANT CHANGE UP
TROPONIN T, HIGH SENSITIVITY RESULT: 6 NG/L — SIGNIFICANT CHANGE UP (ref 0–51)
TSH SERPL-MCNC: 1.55 UIU/ML — SIGNIFICANT CHANGE UP (ref 0.27–4.2)
VARIANT LYMPHS # BLD: 1.8 % — SIGNIFICANT CHANGE UP (ref 0–6)
WBC # BLD: 3.96 K/UL — SIGNIFICANT CHANGE UP (ref 3.8–10.5)
WBC # FLD AUTO: 3.96 K/UL — SIGNIFICANT CHANGE UP (ref 3.8–10.5)

## 2024-07-17 PROCEDURE — 99233 SBSQ HOSP IP/OBS HIGH 50: CPT

## 2024-07-17 PROCEDURE — 93306 TTE W/DOPPLER COMPLETE: CPT | Mod: 26

## 2024-07-17 RX ORDER — POTASSIUM CHLORIDE 600 MG/1
40 TABLET, FILM COATED, EXTENDED RELEASE ORAL ONCE
Refills: 0 | Status: COMPLETED | OUTPATIENT
Start: 2024-07-17 | End: 2024-07-17

## 2024-07-17 RX ORDER — HYDROXYCHLOROQUINE SULFATE 200 MG
200 TABLET ORAL EVERY 12 HOURS
Refills: 0 | Status: DISCONTINUED | OUTPATIENT
Start: 2024-07-17 | End: 2024-07-18

## 2024-07-17 RX ORDER — MAGNESIUM OXIDE 400 MG/1
800 TABLET ORAL ONCE
Refills: 0 | Status: COMPLETED | OUTPATIENT
Start: 2024-07-17 | End: 2024-07-17

## 2024-07-17 RX ADMIN — Medication 200 MILLIGRAM(S): at 01:05

## 2024-07-17 RX ADMIN — Medication 650 MILLIGRAM(S): at 19:27

## 2024-07-17 RX ADMIN — SPIRONOLACTONE 25 MILLIGRAM(S): 25 TABLET ORAL at 06:23

## 2024-07-17 RX ADMIN — ENOXAPARIN SODIUM 40 MILLIGRAM(S): 100 INJECTION SUBCUTANEOUS at 18:27

## 2024-07-17 RX ADMIN — Medication 200 MILLIGRAM(S): at 18:27

## 2024-07-17 RX ADMIN — Medication 650 MILLIGRAM(S): at 18:27

## 2024-07-17 RX ADMIN — Medication 650 MILLIGRAM(S): at 06:22

## 2024-07-17 RX ADMIN — Medication 25 MILLIGRAM(S): at 19:15

## 2024-07-17 RX ADMIN — Medication 25 MILLIGRAM(S): at 01:08

## 2024-07-17 RX ADMIN — SACUBITRIL AND VALSARTAN 1 TABLET(S): 97; 103 TABLET, FILM COATED ORAL at 21:39

## 2024-07-17 RX ADMIN — MAGNESIUM OXIDE 800 MILLIGRAM(S): 400 TABLET ORAL at 11:01

## 2024-07-17 RX ADMIN — DAPAGLIFLOZIN 10 MILLIGRAM(S): 10 TABLET, FILM COATED ORAL at 11:01

## 2024-07-17 RX ADMIN — TICAGRELOR 90 MILLIGRAM(S): 90 TABLET ORAL at 18:27

## 2024-07-17 RX ADMIN — ASPIRIN 81 MILLIGRAM(S): 325 TABLET, FILM COATED ORAL at 11:02

## 2024-07-17 RX ADMIN — TICAGRELOR 90 MILLIGRAM(S): 90 TABLET ORAL at 01:08

## 2024-07-17 RX ADMIN — Medication 650 MILLIGRAM(S): at 01:00

## 2024-07-17 RX ADMIN — ATORVASTATIN CALCIUM 80 MILLIGRAM(S): 20 TABLET, FILM COATED ORAL at 21:39

## 2024-07-17 RX ADMIN — SACUBITRIL AND VALSARTAN 1 TABLET(S): 97; 103 TABLET, FILM COATED ORAL at 01:08

## 2024-07-17 RX ADMIN — POTASSIUM CHLORIDE 40 MILLIEQUIVALENT(S): 600 TABLET, FILM COATED, EXTENDED RELEASE ORAL at 11:02

## 2024-07-17 RX ADMIN — Medication 650 MILLIGRAM(S): at 06:00

## 2024-07-17 RX ADMIN — Medication 650 MILLIGRAM(S): at 02:00

## 2024-07-17 NOTE — PATIENT PROFILE ADULT - HEALTH LITERACY
Airway    Date/Time: 3/13/2024 1:37 PM    Performed by: Cody Boyer M.D.  Authorized by: Cody Boyer M.D.    Location:  OR  Urgency:  Elective  Indications for Airway Management:  Anesthesia      Spontaneous Ventilation: absent    Sedation Level:  Deep  Preoxygenated: Yes    Patient Position:  Sniffing  Mask Difficulty Assessment:  1 - vent by mask  Final Airway Type:  Endotracheal airway  Final Endotracheal Airway:  ETT  Cuffed: Yes    Technique Used for Successful ETT Placement:  Direct laryngoscopy    Insertion Site:  Oral  Blade Type:  Joseph  Laryngoscope Blade/Videolaryngoscope Blade Size:  3  ETT Size (mm):  7.0  Measured from:  Teeth  ETT to Teeth (cm):  22  Placement Verified by: auscultation and capnometry    Cormack-Lehane Classification:  Grade IIa - partial view of glottis  Number of Attempts at Approach:  1   First attempt by MS4 unsuccessful. ETT was advanced into the esophagus.         no

## 2024-07-17 NOTE — PROGRESS NOTE ADULT - PROBLEM SELECTOR PLAN 3
TTE post cath EF 30-35% severe hypokinesis 2/2 MI (started GDMT Entresto 24-26mg bid, Metoprolol 25mg bid, Farxiga 10mg daily and Spironolactone 25mg daily)    -TTE 7/17: Mildly reduced LV systolic function, EF 45-50%. The mid and apical anterior septum, apical anterior segment, apical inferior segment, and apex are hypokinetic. All remaining segments are normal.   -continue Core Measures (daily weights and strict I&O q4hrs w/VSS) TTE post cath EF 30-35% severe hypokinesis 2/2 MI   - Pro-  - TTE 7/17: Mildly reduced LV systolic function, EF 45-50%. The mid and apical anterior septum, apical anterior segment, apical inferior segment, and apex are hypokinetic. All remaining segments are normal.   - Continue GDMT: Entresto 24-26mg bid, Metoprolol 25mg bid, Farxiga 10mg daily and Spironolactone 25mg daily  - Continue Core Measures (daily weights and strict I&O q4hrs w/VSS) TTE post cath EF 30-35% severe hypokinesis 2/2 MI (2/2024)  Appears euvolemic on exam   - Pro- (7/17)  - TTE 7/17: Mildly reduced LV systolic function, EF 45-50%. The mid and apical anterior septum, apical anterior segment, apical inferior segment, and apex are hypokinetic. All remaining segments are normal.   - Diuretics: --   - GDMT: Entresto 24-26mg bid, Metoprolol 25mg bid, Farxiga 10mg daily and Spironolactone 25mg daily  - Core Measures. Daily weights and strict I&O q4hrs w/VSS TTE post cath EF 30-35% severe hypokinesis 2/2 MI (2/2024)  Appears euvolemic on exam   - CXR 7/16: No infiltrate pleural effusion or pneumothorax. Unremarkable cardiac silhouette. No acute bone abnormality.  - Pro- (7/17)  - TTE 7/17: Mildly reduced LV systolic function, EF 45-50%. The mid and apical anterior septum, apical anterior segment, apical inferior segment, and apex are hypokinetic. All remaining segments are normal.   - Diuretics: --   - GDMT: Entresto 24-26mg bid, Metoprolol 25mg bid, Farxiga 10mg daily and Spironolactone 25mg daily  - Core Measures. Strict I/Os and Daily Weights - Continue Plaquenil 200mg bid

## 2024-07-17 NOTE — PROGRESS NOTE ADULT - PROBLEM SELECTOR PLAN 7
baseline Hgb 10, H/H on admission 9.2/30.0 Plts 247  -continue to monitor CBC    F:   E:   N: DASH / NPO midnight baseline Hgb 10, H/H on admission 9.2/30.0 Plts 247  -No clinical signs or symptoms of bleeding   -Continue to monitor CBC      FULL CODE  DVT Prophylaxis: Lovenox 40 mg SQ Q12H, ambulate as tolerated  Case discussed with Dr. Gonzalez.

## 2024-07-17 NOTE — PROGRESS NOTE ADULT - PROBLEM SELECTOR PLAN 4
-continue Plaquenil 200mg bid - Continue Plaquenil 200mg bid LDL 56, HDL 53, Total cholesterol 123, Triglycerides 64  -continue Atorvastatin 80mg daily

## 2024-07-17 NOTE — PROGRESS NOTE ADULT - PROBLEM SELECTOR PLAN 5
Monitor BP  -continue Metoprolol tart 25mg bid; Entresto 24-26mg bid w parameters Well controlled  - Monitor BP  - Continue Metoprolol tart 25mg bid; Entresto 24-26mg bid w parameters baseline Hgb 10, H/H on admission 9.2/30.0 Plts 247  -No clinical signs or symptoms of bleeding   -Continue to monitor CBC      FULL CODE  DVT Prophylaxis: Lovenox 40 mg SQ Q12H, ambulate as tolerated  Case discussed with Dr. Gonzalez.

## 2024-07-17 NOTE — PROGRESS NOTE ADULT - PROBLEM SELECTOR PLAN 6
LDL 56  -continue Atorvastatin 80mg daily LDL 56, HDL 53, Total cholesterol 123, Triglycerides 64  -continue Atorvastatin 80mg daily

## 2024-07-17 NOTE — PATIENT PROFILE ADULT - FALL HARM RISK - HARM RISK INTERVENTIONS
Assistance with ambulation/Assistance OOB with selected safe patient handling equipment/Communicate Risk of Fall with Harm to all staff/Discuss with provider need for PT consult/Monitor gait and stability/Provide patient with walking aids - walker, cane, crutches/Reinforce activity limits and safety measures with patient and family/Review medications for side effects contributing to fall risk/Sit up slowly, dangle for a short time, stand at bedside before walking/Tailored Fall Risk Interventions/Toileting schedule using arm’s reach rule for commode and bathroom/Use of alarms - bed, chair and/or voice tab/Visual Cue: Yellow wristband and red socks/Bed in lowest position, wheels locked, appropriate side rails in place/Call bell, personal items and telephone in reach/Instruct patient to call for assistance before getting out of bed or chair/Non-slip footwear when patient is out of bed/[place] to call system/Physically safe environment - no spills, clutter or unnecessary equipment/Purposeful Proactive Rounding/Room/bathroom lighting operational, light cord in reach

## 2024-07-17 NOTE — PROGRESS NOTE ADULT - PROBLEM SELECTOR PLAN 2
hx STEMI s/ps/p [facility] 2/2/24 @[facility] s/p PCI  LI pLAD 100% occlusion via RRA (on ASA, Brilinta) IVUS guided  LM nl, mild diffuse disease [place]-  D1, LCx, OM1, RCA, RPDA. LVEDP 10 mmHg   -continue ASA Ec 81mg daily and Brilinta 90mg bid, Atorvastatin 80mg daily, Metoprolol tart 25mg bid  -NPO for NST 7/18 hx STEMI s/ps/p [facility] 2/2/24 @[facility] s/p PCI  LI pLAD 100% occlusion via RRA (on ASA, Brilinta) IVUS guided LM nl, mild diffuse disease [place]-  D1, LCx, OM1, RCA, RPDA. LVEDP 10 mmHg   - Continue ASA Ec 81mg daily and Brilinta 90mg bid, Atorvastatin 80mg daily, Metoprolol tart 25mg bid  - NPO for NST 7/18 hx STEMI s/ps/p [facility] 2/2/24 @[facility] s/p PCI  LI pLAD 100% occlusion via RRA (on ASA, Brilinta) IVUS guided LM nl, mild diffuse disease [place]-  D1, LCx, OM1, RCA, RPDA. LVEDP 10 mmHg   - Continue ASA Ec 81mg daily and Brilinta 90mg bid, Atorvastatin 80mg daily, Metoprolol tart 25mg bid  - NPO midnight for NST 7/18 Warm and euvolemic on exam   - CXR 7/16: No infiltrate pleural effusion or ptx. Unremarkable cardiac silhouette. No acute bone abnormality.  - Pro- (7/17)  - Prior TTE: EF 30-35% w/  severe hypokinesis 2/2 MI (2/2024)  - Repeat TTE improved as detailed above  - Diuretics: euvolemic off diuretic  - GDMT: Entresto 24-26mg bid, Metoprolol 25mg bid, Farxiga 10mg daily and Spironolactone 25mg daily  - Core Measures. Strict I/Os and Daily Weights

## 2024-07-17 NOTE — PROGRESS NOTE ADULT - PROBLEM SELECTOR PLAN 1
Telemetry, ECG (Sinus Cedric HR@59bpm with non specific ST-T wave changes) Troponin T High Sensitivity neg X2 6-->6  hx STEMI CAD s/p [facility] 2/2/24 @[facility] s/p PCI LI pLAD 100% occlusion via RRA (on ASA, Brilinta) IVUS guided LM nl, mild diffuse disease throughout-  D1, LCx, OM1, RCA, RPDA. LVEDP 10 mmHg   -continue ASA Ec 81mg daily; Brilinta 90mg bid; Atorvastatin 80mg daily, Metoprolol tart 25mg bid  -TTE 7/17: Mildly reduced LV systolic function, EF 45-50%. The mid and apical anterior septum, apical anterior segment, apical inferior segment, and apex are hypokinetic. All remaining segments are normal.   -NPO for NST 7/18 Telemetry, ECG (Sinus Cedric HR@59bpm with non specific ST-T wave changes)   Troponin T High Sensitivity neg X2 6-->6 (CK/CKMB negative)  hx STEMI CAD s/p [facility] 2/2/24 @[facility] s/p PCI LI pLAD 100% occlusion via RRA (on ASA, Brilinta) IVUS guided LM nl, mild diffuse disease throughout-  D1, LCx, OM1, RCA, RPDA. LVEDP 10 mmHg   - Continue ASA Ec 81mg daily; Brilinta 90mg bid; Atorvastatin 80mg daily, Metoprolol tart 25mg bid  - TTE 7/17: Mildly reduced LV systolic function, EF 45-50%. The mid and apical anterior septum, apical anterior segment, apical inferior segment, and apex are hypokinetic. All remaining segments are normal.   - NPO for NST 7/18 Telemetry, ECG (Sinus Cedric HR@59bpm with non specific ST-T wave changes)   Troponin T High Sensitivity neg X2 6-->6 (CK/CKMB negative)  hx STEMI CAD s/p [facility] 2/2/24 @[facility] s/p PCI LI pLAD 100% occlusion via RRA (on ASA, Brilinta) IVUS guided LM nl, mild diffuse disease throughout-  D1, LCx, OM1, RCA, RPDA. LVEDP 10 mmHg   - Continue ASA Ec 81mg daily; Brilinta 90mg bid; Atorvastatin 80mg daily, Metoprolol tart 25mg bid  - TTE 7/17: Mildly reduced LV systolic function, EF 45-50%. The mid and apical anterior septum, apical anterior segment, apical inferior segment, and apex are hypokinetic. All remaining segments are normal.   - NPO midnight for NST 7/18 hx STEMI CAD s/p [facility] 2/2/24 @[facility] s/p PCI LI pLAD 100% occlusion via RRA (on ASA, Brilinta) IVUS guided LM nl, mild diffuse disease throughout-  D1, LCx, OM1, RCA, RPDA. LVEDP 10 mmHg   Troponin T Hs neg X2 6-->6. CK CKMB negative. EKG showed sinus cristina HR@59bpm with non specific ST-T wave changes  - Continue telemetry  - Continue ASA Ec 81mg daily; Brilinta 90mg bid; Atorvastatin 80mg daily, Metoprolol tart 25mg bid  - TTE 7/17: Mildly reduced LV systolic function, EF 45-50%. The mid and apical anterior septum, apical anterior segment, apical inferior segment, and apex are hypokinetic. All remaining segments are normal.   - NPO midnight for NST 7/18 hx STEMI CAD s/p [facility] 2/2/24 @[facility] s/p PCI LI pLAD 100% occlusion via RRA (on ASA, Brilinta) IVUS guided LM nl, mild diffuse disease throughout-  D1, LCx, OM1, RCA, RPDA. LVEDP 10 mmHg   Troponin T Hs neg X2 6-->6. CK CKMB negative. EKG showed sinus cristina HR@59bpm with non specific ST-T wave changes  - Continue telemetry  - Continue ASA Ec 81mg daily; Brilinta 90mg BID; Atorvastatin 80mg daily, Metoprolol tart 25mg BID  - TTE 7/17/24: LVEF 45-50%. Mid and apical anterior septum, apical anterior segment, apical inferior segment, and apex are hypokinetic; All remaining segments are normal.  Normal RV/atria.  No significant valvular disease.  - NST unable to happen 7/17. NPO midnight for NST 7/18

## 2024-07-17 NOTE — PROGRESS NOTE ADULT - SUBJECTIVE AND OBJECTIVE BOX
Interventional Cardiology PA Adult Progress Note    Subjective Assessment: Patient seen and examined at bedside. Feels her chest pain is improved but still present. Denies SOB, f/c, n/v/d. Does endorse she "had the flu" approximately end of June through beginning of July; has felt better for 1-2 weeks prior to admission.   	  MEDICATIONS:  metoprolol tartrate 25 milliGRAM(s) Oral every 12 hours  sacubitril 24 mG/valsartan 26 mG 1 Tablet(s) Oral every 12 hours  spironolactone 25 milliGRAM(s) Oral daily    hydroxychloroquine 200 milliGRAM(s) Oral every 12 hours    ipratropium    for Nebulization 500 MICROGram(s) Nebulizer every 6 hours PRN    acetaminophen     Tablet .. 650 milliGRAM(s) Oral every 6 hours PRN      atorvastatin 80 milliGRAM(s) Oral at bedtime  dapagliflozin 10 milliGRAM(s) Oral daily    aspirin enteric coated 81 milliGRAM(s) Oral daily  enoxaparin Injectable 40 milliGRAM(s) SubCutaneous every 12 hours  ticagrelor 90 milliGRAM(s) Oral every 12 hours        [PHYSICAL EXAM:  TELEMETRY: sinus bradycardia  T(C): 36.4 (07-17-24 @ 11:07), Max: 37.2 (07-16-24 @ 20:07)  HR: 55 (07-17-24 @ 11:07) (55 - 69)  BP: 93/51 (07-17-24 @ 11:07) (93/51 - 135/78)  RR: 18 (07-17-24 @ 11:07) (16 - 18)  SpO2: 100% (07-17-24 @ 11:07) (95% - 100%)  Wt(kg): --  I&O's Summary    16 Jul 2024 07:01  -  17 Jul 2024 07:00  --------------------------------------------------------  IN: 115 mL / OUT: 0 mL / NET: 115 mL    17 Jul 2024 07:01  -  17 Jul 2024 15:06  --------------------------------------------------------  IN: 0 mL / OUT: 0 mL / NET: 0 mL      Height (cm): 170.2 (07-17 @ 00:30)  Weight (kg): 103 (07-17 @ 00:30)  BMI (kg/m2): 35.6 (07-17 @ 00:30)  BSA (m2): 2.13 (07-17 @ 00:30)  Strange:  Central/PICC/Mid Line:                                         Appearance: Pt seen in bed in NAD 	  HEENT:   Normal oral mucosa, PERRL, EOMI	  Neck: Supple  Cardiovascular: Normal S1 S2, No JVD, No murmurs  Respiratory: Lungs clear to auscultation b/l; No Rales, Rhonchi, Wheezing	  Gastrointestinal:  Soft, Non-tender, + BS	  Skin: No rashes, No ecchymoses, No cyanosis  Extremities: Normal range of motion, No clubbing, cyanosis. trace BL LE edema  Vascular: Peripheral pulses palpable 1+ bilaterally DP/PT, 2+ radial  Neurologic: Non-focal  Psychiatry: A & O x 3, Mood & affect appropriate  	    LABS:	 	  CARDIAC MARKERS:                                  9.3    3.96  )-----------( 221      ( 17 Jul 2024 05:30 )             30.3     07-17    137  |  107  |  12  ----------------------------<  101<H>  3.5   |  20<L>  |  0.84    Ca    9.0      17 Jul 2024 05:30  Mg     1.8     07-17    TPro  8.2  /  Alb  3.8  /  TBili  0.5  /  DBili  <0.2  /  AST  21  /  ALT  17  /  AlkPhos  77  07-17    proBNP:   Lipid Profile:   HgA1c:   TSH: Thyroid Stimulating Hormone, Serum: 1.550 uIU/mL (07-17 @ 05:30)    PT/INR - ( 17 Jul 2024 05:30 )   PT: 12.3 sec;   INR: 1.08          PTT - ( 17 Jul 2024 05:30 )  PTT:28.5 sec     Interventional Cardiology PA Adult Progress Note    Subjective Assessment: Patient seen and examined at bedside. Feels her chest pain is improved but still present. Denies SOB, f/c, n/v/d. Does endorse she "had the flu" approximately end of June through beginning of July; has felt better for 1-2 weeks prior to admission.   	  MEDICATIONS:  metoprolol tartrate 25 milliGRAM(s) Oral every 12 hours  sacubitril 24 mG/valsartan 26 mG 1 Tablet(s) Oral every 12 hours  spironolactone 25 milliGRAM(s) Oral daily    hydroxychloroquine 200 milliGRAM(s) Oral every 12 hours    ipratropium    for Nebulization 500 MICROGram(s) Nebulizer every 6 hours PRN    acetaminophen     Tablet .. 650 milliGRAM(s) Oral every 6 hours PRN      atorvastatin 80 milliGRAM(s) Oral at bedtime  dapagliflozin 10 milliGRAM(s) Oral daily    aspirin enteric coated 81 milliGRAM(s) Oral daily  enoxaparin Injectable 40 milliGRAM(s) SubCutaneous every 12 hours  ticagrelor 90 milliGRAM(s) Oral every 12 hours        [PHYSICAL EXAM:  TELEMETRY: sinus bradycardia  T(C): 36.4 (07-17-24 @ 11:07), Max: 37.2 (07-16-24 @ 20:07)  HR: 55 (07-17-24 @ 11:07) (55 - 69)  BP: 93/51 (07-17-24 @ 11:07) (93/51 - 135/78)  RR: 18 (07-17-24 @ 11:07) (16 - 18)  SpO2: 100% (07-17-24 @ 11:07) (95% - 100%)  Wt(kg): --  I&O's Summary    16 Jul 2024 07:01  -  17 Jul 2024 07:00  --------------------------------------------------------  IN: 115 mL / OUT: 0 mL / NET: 115 mL    17 Jul 2024 07:01  -  17 Jul 2024 15:06  --------------------------------------------------------  IN: 0 mL / OUT: 0 mL / NET: 0 mL      Height (cm): 170.2 (07-17 @ 00:30)  Weight (kg): 103 (07-17 @ 00:30)  BMI (kg/m2): 35.6 (07-17 @ 00:30)  BSA (m2): 2.13 (07-17 @ 00:30)  Strange:  Central/PICC/Mid Line:                                         Appearance: Pt seen in bed in NAD 	  HEENT:   Normal oral mucosa, PERRL, EOMI	  Neck: Supple  Cardiovascular: Normal S1 S2, No JVD, No murmurs  Respiratory: Lungs clear to auscultation b/l; No Rales, Rhonchi, Wheezing	  Gastrointestinal:  Soft, Non-tender, + BS	  Skin: No rashes, No ecchymoses, No cyanosis  Extremities: Normal range of motion, No clubbing, cyanosis. trace BL LE edema  Vascular: Peripheral pulses palpable 1+ bilaterally DP/PT, 2+ radial  Neurologic: Non-focal  Psychiatry: A & O x 3, Mood & affect appropriate  	    LABS:	 	  CARDIAC MARKERS:                                  9.3    3.96  )-----------( 221      ( 17 Jul 2024 05:30 )             30.3     07-17    137  |  107  |  12  ----------------------------<  101<H>  3.5   |  20<L>  |  0.84    Ca    9.0      17 Jul 2024 05:30  Mg     1.8     07-17    TPro  8.2  /  Alb  3.8  /  TBili  0.5  /  DBili  <0.2  /  AST  21  /  ALT  17  /  AlkPhos  77  07-17    proBNP: 283 (07-17 @ 05:30)  Lipid Profile: LDL 56, HDL 53, Total cholesterol 123, Triglycerides 64 (07-17 @ 05:30)  HgA1c:   TSH: Thyroid Stimulating Hormone, Serum: 1.550 uIU/mL (07-17 @ 05:30)    PT/INR - ( 17 Jul 2024 05:30 )   PT: 12.3 sec;   INR: 1.08          PTT - ( 17 Jul 2024 05:30 )  PTT:28.5 sec

## 2024-07-17 NOTE — PROGRESS NOTE ADULT - PROBLEM SELECTOR PLAN 8
Refill passed per [facility], [facility] protocol. Requested Prescriptions   Pending Prescriptions Disp Refills    Loratadine-Pseudoephedrine ER (WAL-LASHAUN ROSEN 24 HOUR)  MG Oral Tablet 24 Hr 165 tablet 0     Sig: Take 1 tablet by mouth daily.         Authur Meckel Lovenox 40mg SQ q12hrs; ambulate as tolerated

## 2024-07-18 ENCOUNTER — NON-APPOINTMENT (OUTPATIENT)
Age: 44
End: 2024-07-18

## 2024-07-18 ENCOUNTER — TRANSCRIPTION ENCOUNTER (OUTPATIENT)
Age: 44
End: 2024-07-18

## 2024-07-18 VITALS
RESPIRATION RATE: 18 BRPM | HEART RATE: 71 BPM | SYSTOLIC BLOOD PRESSURE: 103 MMHG | OXYGEN SATURATION: 99 % | DIASTOLIC BLOOD PRESSURE: 58 MMHG

## 2024-07-18 PROBLEM — I25.2 OLD MYOCARDIAL INFARCTION: Chronic | Status: ACTIVE | Noted: 2024-07-16

## 2024-07-18 PROBLEM — I50.22 CHRONIC SYSTOLIC (CONGESTIVE) HEART FAILURE: Chronic | Status: ACTIVE | Noted: 2024-07-16

## 2024-07-18 PROBLEM — I25.10 ATHEROSCLEROTIC HEART DISEASE OF NATIVE CORONARY ARTERY WITHOUT ANGINA PECTORIS: Chronic | Status: ACTIVE | Noted: 2024-07-16

## 2024-07-18 PROBLEM — E78.5 HYPERLIPIDEMIA, UNSPECIFIED: Chronic | Status: ACTIVE | Noted: 2024-07-16

## 2024-07-18 LAB
ALBUMIN SERPL ELPH-MCNC: 4.1 G/DL — SIGNIFICANT CHANGE UP (ref 3.3–5)
ALP SERPL-CCNC: 75 U/L — SIGNIFICANT CHANGE UP (ref 40–120)
ALT FLD-CCNC: 17 U/L — SIGNIFICANT CHANGE UP (ref 10–45)
ANION GAP SERPL CALC-SCNC: 11 MMOL/L — SIGNIFICANT CHANGE UP (ref 5–17)
APTT BLD: 30.3 SEC — SIGNIFICANT CHANGE UP (ref 24.5–35.6)
AST SERPL-CCNC: 19 U/L — SIGNIFICANT CHANGE UP (ref 10–40)
BILIRUB SERPL-MCNC: 0.5 MG/DL — SIGNIFICANT CHANGE UP (ref 0.2–1.2)
BLD GP AB SCN SERPL QL: NEGATIVE — SIGNIFICANT CHANGE UP
BUN SERPL-MCNC: 13 MG/DL — SIGNIFICANT CHANGE UP (ref 7–23)
CALCIUM SERPL-MCNC: 9.2 MG/DL — SIGNIFICANT CHANGE UP (ref 8.4–10.5)
CHLORIDE SERPL-SCNC: 104 MMOL/L — SIGNIFICANT CHANGE UP (ref 96–108)
CO2 SERPL-SCNC: 22 MMOL/L — SIGNIFICANT CHANGE UP (ref 22–31)
CREAT SERPL-MCNC: 0.95 MG/DL — SIGNIFICANT CHANGE UP (ref 0.5–1.3)
EGFR: 76 ML/MIN/1.73M2 — SIGNIFICANT CHANGE UP
GLUCOSE SERPL-MCNC: 118 MG/DL — HIGH (ref 70–99)
HCT VFR BLD CALC: 32.6 % — LOW (ref 34.5–45)
HGB BLD-MCNC: 9.3 G/DL — LOW (ref 11.5–15.5)
INR BLD: 1 — SIGNIFICANT CHANGE UP (ref 0.85–1.18)
MAGNESIUM SERPL-MCNC: 1.8 MG/DL — SIGNIFICANT CHANGE UP (ref 1.6–2.6)
MCHC RBC-ENTMCNC: 21.6 PG — LOW (ref 27–34)
MCHC RBC-ENTMCNC: 28.5 GM/DL — LOW (ref 32–36)
MCV RBC AUTO: 75.8 FL — LOW (ref 80–100)
NRBC # BLD: 0 /100 WBCS — SIGNIFICANT CHANGE UP (ref 0–0)
PLATELET # BLD AUTO: 240 K/UL — SIGNIFICANT CHANGE UP (ref 150–400)
POTASSIUM SERPL-MCNC: 4.4 MMOL/L — SIGNIFICANT CHANGE UP (ref 3.5–5.3)
POTASSIUM SERPL-SCNC: 4.4 MMOL/L — SIGNIFICANT CHANGE UP (ref 3.5–5.3)
PROT SERPL-MCNC: 8.5 G/DL — HIGH (ref 6–8.3)
PROTHROM AB SERPL-ACNC: 11.4 SEC — SIGNIFICANT CHANGE UP (ref 9.5–13)
RBC # BLD: 4.3 M/UL — SIGNIFICANT CHANGE UP (ref 3.8–5.2)
RBC # FLD: 16.2 % — HIGH (ref 10.3–14.5)
RH IG SCN BLD-IMP: POSITIVE — SIGNIFICANT CHANGE UP
SODIUM SERPL-SCNC: 137 MMOL/L — SIGNIFICANT CHANGE UP (ref 135–145)
WBC # BLD: 3.44 K/UL — LOW (ref 3.8–10.5)
WBC # FLD AUTO: 3.44 K/UL — LOW (ref 3.8–10.5)

## 2024-07-18 PROCEDURE — 81025 URINE PREGNANCY TEST: CPT

## 2024-07-18 PROCEDURE — C8929: CPT

## 2024-07-18 PROCEDURE — 36415 COLL VENOUS BLD VENIPUNCTURE: CPT

## 2024-07-18 PROCEDURE — 82553 CREATINE MB FRACTION: CPT

## 2024-07-18 PROCEDURE — 86140 C-REACTIVE PROTEIN: CPT

## 2024-07-18 PROCEDURE — 86901 BLOOD TYPING SEROLOGIC RH(D): CPT

## 2024-07-18 PROCEDURE — 85027 COMPLETE CBC AUTOMATED: CPT

## 2024-07-18 PROCEDURE — A9500: CPT

## 2024-07-18 PROCEDURE — 85652 RBC SED RATE AUTOMATED: CPT

## 2024-07-18 PROCEDURE — 71045 X-RAY EXAM CHEST 1 VIEW: CPT

## 2024-07-18 PROCEDURE — 84443 ASSAY THYROID STIM HORMONE: CPT

## 2024-07-18 PROCEDURE — 78452 HT MUSCLE IMAGE SPECT MULT: CPT

## 2024-07-18 PROCEDURE — 86900 BLOOD TYPING SEROLOGIC ABO: CPT

## 2024-07-18 PROCEDURE — 93005 ELECTROCARDIOGRAM TRACING: CPT

## 2024-07-18 PROCEDURE — 82248 BILIRUBIN DIRECT: CPT

## 2024-07-18 PROCEDURE — 80061 LIPID PANEL: CPT

## 2024-07-18 PROCEDURE — 86850 RBC ANTIBODY SCREEN: CPT

## 2024-07-18 PROCEDURE — 80053 COMPREHEN METABOLIC PANEL: CPT

## 2024-07-18 PROCEDURE — 99285 EMERGENCY DEPT VISIT HI MDM: CPT

## 2024-07-18 PROCEDURE — 85610 PROTHROMBIN TIME: CPT

## 2024-07-18 PROCEDURE — 93017 CV STRESS TEST TRACING ONLY: CPT

## 2024-07-18 PROCEDURE — 84484 ASSAY OF TROPONIN QUANT: CPT

## 2024-07-18 PROCEDURE — 78452 HT MUSCLE IMAGE SPECT MULT: CPT | Mod: 26,59

## 2024-07-18 PROCEDURE — 85730 THROMBOPLASTIN TIME PARTIAL: CPT

## 2024-07-18 PROCEDURE — 83735 ASSAY OF MAGNESIUM: CPT

## 2024-07-18 PROCEDURE — 82550 ASSAY OF CK (CPK): CPT

## 2024-07-18 PROCEDURE — 85025 COMPLETE CBC W/AUTO DIFF WBC: CPT

## 2024-07-18 PROCEDURE — 99233 SBSQ HOSP IP/OBS HIGH 50: CPT

## 2024-07-18 PROCEDURE — 83880 ASSAY OF NATRIURETIC PEPTIDE: CPT

## 2024-07-18 PROCEDURE — 93018 CV STRESS TEST I&R ONLY: CPT

## 2024-07-18 PROCEDURE — 93016 CV STRESS TEST SUPVJ ONLY: CPT

## 2024-07-18 RX ORDER — ISOSORBIDE MONONITRATE 30 MG/1
1 TABLET, EXTENDED RELEASE ORAL
Qty: 30 | Refills: 3
Start: 2024-07-18 | End: 2024-11-14

## 2024-07-18 RX ORDER — ISOSORBIDE MONONITRATE 30 MG/1
30 TABLET, EXTENDED RELEASE ORAL DAILY
Refills: 0 | Status: DISCONTINUED | OUTPATIENT
Start: 2024-07-18 | End: 2024-07-18

## 2024-07-18 RX ORDER — MAGNESIUM OXIDE 400 MG/1
800 TABLET ORAL ONCE
Refills: 0 | Status: COMPLETED | OUTPATIENT
Start: 2024-07-18 | End: 2024-07-18

## 2024-07-18 RX ORDER — METOPROLOL TARTRATE 50 MG
1 TABLET ORAL
Qty: 30 | Refills: 3
Start: 2024-07-18 | End: 2024-11-14

## 2024-07-18 RX ADMIN — Medication 200 MILLIGRAM(S): at 06:47

## 2024-07-18 RX ADMIN — ASPIRIN 81 MILLIGRAM(S): 325 TABLET, FILM COATED ORAL at 17:05

## 2024-07-18 RX ADMIN — TICAGRELOR 90 MILLIGRAM(S): 90 TABLET ORAL at 06:46

## 2024-07-18 RX ADMIN — Medication 200 MILLIGRAM(S): at 18:35

## 2024-07-18 RX ADMIN — MAGNESIUM OXIDE 800 MILLIGRAM(S): 400 TABLET ORAL at 08:55

## 2024-07-18 RX ADMIN — SPIRONOLACTONE 25 MILLIGRAM(S): 25 TABLET ORAL at 06:47

## 2024-07-18 RX ADMIN — DAPAGLIFLOZIN 10 MILLIGRAM(S): 10 TABLET, FILM COATED ORAL at 17:05

## 2024-07-18 RX ADMIN — TICAGRELOR 90 MILLIGRAM(S): 90 TABLET ORAL at 17:05

## 2024-07-18 RX ADMIN — Medication 25 MILLIGRAM(S): at 18:35

## 2024-07-18 RX ADMIN — Medication 650 MILLIGRAM(S): at 17:05

## 2024-07-18 RX ADMIN — ISOSORBIDE MONONITRATE 30 MILLIGRAM(S): 30 TABLET, EXTENDED RELEASE ORAL at 17:06

## 2024-07-18 NOTE — DISCHARGE NOTE NURSING/CASE MANAGEMENT/SOCIAL WORK - NSDCPEFALRISK_GEN_ALL_CORE
For information on Fall & Injury Prevention, visit: [email or web address] OR  [email or web address] OR  https://www.cdc.gov/steadi/patient.html

## 2024-07-18 NOTE — PROGRESS NOTE ADULT - ASSESSMENT
42 y/o obese female, who ambulates with cane, w/ PMHx  SLE (on Plaquenil), HTN, anemia (baseline Hgb 10), heavy menstrual cycles (saturated 12-14 tampons per 24 hours, OBGYN recommended Mirena IUD), uterine fibroid, L ovary cyst, spinal stenosis, and hx STEMI CAD s/p [facility] 2/2/24 @[facility] s/p PCI LI pLAD 100% occlusion via RRA (on ASA, Brilinta), HFrEF (TTE post cath EF 30-35%, severe hypokinesis 2/2 MI, started GDMT Entresto 24-26mg bid, Metoprolol 25mg bid, Farxiga 10mg daily and Spironolactone 25mg daily) presents to [facility] ER on 7/16/24, complaining of intermittent, left-sided chest pressure radiating to left shoulder associated with nausea after ambulating, symptoms improved with rest. Patient admitted to [facility] for Unstable Angina. NST on 7/18. 
42 y/o obese female, who ambulates with cane, w/ PMHx  SLE (on Plaquenil), HTN, anemia (baseline Hgb 10), heavy menstrual cycles (saturated 12-14 tampons per 24 hours, OBGYN recommended Mirena IUD), uterine fibroid, L ovary cyst, spinal stenosis, and hx STEMI CAD s/p C 2/2/24 @[facility] s/p PCI LI pLAD 100% occlusion via RRA (on ASA, Brilinta), HFrEF (TTE post cath EF 30-35%, severe hypokinesis 2/2 MI, started GDMT Entresto 24-26mg bid, Metoprolol 25mg bid, Farxiga 10mg daily and Spironolactone 25mg daily) presents to [facility] ER on 7/16/24, complaining of intermittent, left-sided chest pressure radiating to left shoulder associated with nausea after ambulating, symptoms improved with rest. Patient admitted to [facility] for Unstable Angina. NPO midnight for NST on 7/18.

## 2024-07-18 NOTE — DISCHARGE NOTE PROVIDER - NSDCFUSCHEDAPPT_GEN_ALL_CORE_FT
Bentley Montgomery  [facility]  [facility] [address]  Scheduled Appointment: 07/24/2024    [facility]  [facility] [address]  Scheduled Appointment: 08/05/2024    Bentley Montgomery  [facility]  [facility] [address]  Scheduled Appointment: 08/05/2024

## 2024-07-18 NOTE — PROGRESS NOTE ADULT - PROBLEM SELECTOR PLAN 1
hx STEMI CAD s/p [facility] 2/2/24 @[facility] s/p PCI LI pLAD 100% occlusion via RRA (on ASA, Brilinta) IVUS guided LM nl, mild diffuse disease throughout-  D1, LCx, OM1, RCA, RPDA. LVEDP 10 mmHg   Troponin T Hs neg X2 6-->6. CK CKMB negative. EKG showed sinus cristina HR@59bpm with non specific ST-T wave changes  - Continue telemetry  - Continue ASA Ec 81mg daily; Brilinta 90mg BID; Atorvastatin 80mg daily, Metoprolol tart 25mg BID  - TTE 7/17/24: LVEF 45-50%. Mid and apical anterior septum, apical anterior segment, apical inferior segment, and apex are hypokinetic; All remaining segments are normal.  Normal RV/atria.  No significant valvular disease.  - f/u NST 7/18/24

## 2024-07-18 NOTE — DISCHARGE NOTE PROVIDER - ATTENDING DISCHARGE PHYSICAL EXAMINATION:
Atypical CP, negative cardiac biomarkers, nuclear stress test with prior MI no ischemia.  F/u as outpt

## 2024-07-18 NOTE — DISCHARGE NOTE PROVIDER - NSDCMRMEDTOKEN_GEN_ALL_CORE_FT
aspirin 81 mg oral delayed release tablet: 1 tab(s) orally every 24 hours  atorvastatin 80 mg oral tablet: 1 tab(s) orally once a day (at bedtime)  Entresto 24 mg-26 mg oral tablet: 1 tab(s) orally 2 times a day Chronic Systolic CHF  Farxiga 10 mg oral tablet: 1 tab(s) orally once a day  metoprolol tartrate 25 mg oral tablet: 1 tab(s) orally every 12 hours Please take 1 tablet every 12 hours  Plaquenil 200 mg oral tablet: 1 tab(s) orally every 12 hours  sacubitril-valsartan 24 mg-26 mg oral tablet: 1 tab(s) orally every 12 hours  spironolactone 25 mg oral tablet: 1 tab(s) orally once a day  ticagrelor 90 mg oral tablet: 1 tab(s) orally every 12 hours Please take one tablet every 12 hours   aspirin 81 mg oral delayed release tablet: 1 tab(s) orally every 24 hours  atorvastatin 80 mg oral tablet: 1 tab(s) orally once a day (at bedtime)  Entresto 24 mg-26 mg oral tablet: 1 tab(s) orally 2 times a day Chronic Systolic CHF  Farxiga 10 mg oral tablet: 1 tab(s) orally once a day  isosorbide mononitrate 30 mg oral tablet, extended release: 1 tab(s) orally once a day  Plaquenil 200 mg oral tablet: 1 tab(s) orally every 12 hours  sacubitril-valsartan 24 mg-26 mg oral tablet: 1 tab(s) orally every 12 hours  spironolactone 25 mg oral tablet: 1 tab(s) orally once a day  ticagrelor 90 mg oral tablet: 1 tab(s) orally every 12 hours Please take one tablet every 12 hours  Toprol-XL 50 mg oral tablet, extended release: 1 tab(s) orally once a day

## 2024-07-18 NOTE — DISCHARGE NOTE PROVIDER - HOSPITAL COURSE
42 y/o obese Female, who ambulates with cane  w/ PMHx SLE (on plaquinel), HTN, anemia (baseline Hgb 10), heavy menstrual cycles (saturated 12-14 tampons per 24 hours, OBGYN recommended Mirena IUD), uterine fibroid, L ovary cyst, spinal stenosis, and hx STEMI CAD s/p [facility] 2/2/24 @[facility] s/p PCI  LI pLAD 100% occlusion via RRA (on ASA, Brilinta), HFrEF (TTE post cath EF 30-35% severe hypokinesis) 2/2 ICM (started GDMT Entresto 24-26mg bid, Metoprolol 25mg bid, Farxiga 10mg daily and Spironolactone 25mg daily) presents to [facility] ER this evening, 7/16/24, complaining of intermittent, left-sided chest pressure radiating to left shoulder associated with nausea after ambulating           symptoms approves with rest.     During hospital course, ECG reveals sinus bradycardia HR @ 59bpm with non specific ST-T wave changes, Troponin T High Sensitivity 6-->6 negative. Patient symptoms resolved.    o TTE 7/17/24: LVEF 45-50%. Mid and apical anterior septum, apical anterior segment, apical inferior segment, and apex are hypokinetic; All remaining segments are normal.  Normal RV/atria.  No significant valvular disease.   o NST 7/18/24: Myocardial perfusion imaging is abnormal. There is a medium-sized area of infarction in the apex, apical inferior, and apical septal walls. The LV   is dilated and overall left ventricular systolic function is reduced with regional wall motion abnormalities as mentioned above. The EKG stress test is normal.    Pt is asymptomatic at this time and denies chest pain, SOB, PAREDES, palpitations, dizziness, LOC, N/V, diaphoresis, orthopnea/PND, and leg swelling. Pt able to ambulate and void without complication. VSS. Labs and telemetry reviewed. Pt is a candidate for discharge per Dr. Gonzalez. Pt given appropriate discharge instructions, pt states they have an appropriate amount of their previous home meds unchanged from this visit at home, and any new medications were sent to their pharmacy. Pt instructed to f/u with Dr. Montgomery in 1-2 weeks.    GLP-1 receptor agonist/SGLT2 inhibitor meds discussed w/ patients and encouraged to discuss further with PMD or Endocrinologist at next visit.      Pt discharge copies detail cardiovascular history, medications, testing/treatments, OR patient has created a patient portal account and instructed to provide their records at their 1st appointment.    Discharge meds: Aspirin 81 mg QD, Brilinta 90 mg BID, Atorvastatin 80 mg QD, Entresto 24-26 mg BID, Farxiga 10 mg QD, Spironolactone 25 mg QD, Toprol XL 50 mg QD, Imdur 30 mg QD   42 y/o obese Female, who ambulates with cane  w/ PMHx SLE (on plaquinel), HTN, anemia (baseline Hgb 10), heavy menstrual cycles (saturated 12-14 tampons per 24 hours, OBGYN recommended Mirena IUD), uterine fibroid, L ovary cyst, spinal stenosis, and hx STEMI CAD s/p [facility] 2/2/24 @[facility] s/p PCI  LI pLAD 100% occlusion via RRA (on ASA, Brilinta), HFrEF (TTE post cath EF 30-35% severe hypokinesis) 2/2 ICM (started GDMT Entresto 24-26mg bid, Metoprolol 25mg bid, Farxiga 10mg daily and Spironolactone 25mg daily) presents to [facility] ER this evening, 7/16/24, complaining of intermittent, left-sided chest pressure radiating to left shoulder associated with nausea after ambulating.    During hospital course, ECG reveals sinus bradycardia HR @ 59bpm with non specific ST-T wave changes, Troponin T High Sensitivity 6-->6 negative. Patient symptoms resolved.    o TTE 7/17/24: LVEF 45-50%. Mid and apical anterior septum, apical anterior segment, apical inferior segment, and apex are hypokinetic; All remaining segments are normal.  Normal RV/atria.  No significant valvular disease.   o NST 7/18/24: Myocardial perfusion imaging is abnormal. There is a medium-sized area of infarction in the apex, apical inferior, and apical septal walls. The LV   is dilated and overall left ventricular systolic function is reduced with regional wall motion abnormalities as mentioned above. The EKG stress test is normal.    Pt is asymptomatic at this time and denies chest pain, SOB, PAREDES, palpitations, dizziness, LOC, N/V, diaphoresis, orthopnea/PND, and leg swelling. Pt able to ambulate and void without complication. VSS. Labs and telemetry reviewed. Pt is a candidate for discharge per Dr. Gonzalez. Pt given appropriate discharge instructions, pt states they have an appropriate amount of their previous home meds unchanged from this visit at home, and any new medications were sent to their pharmacy. Pt instructed to f/u with Dr. Montgomery in 1-2 weeks.    GLP-1 receptor agonist/SGLT2 inhibitor meds discussed w/ patients and encouraged to discuss further with PMD or Endocrinologist at next visit.      Pt discharge copies detail cardiovascular history, medications, testing/treatments, OR patient has created a patient portal account and instructed to provide their records at their 1st appointment.    Discharge meds: Aspirin 81 mg QD, Brilinta 90 mg BID, Atorvastatin 80 mg QD, Entresto 24-26 mg BID, Farxiga 10 mg QD, Spironolactone 25 mg QD, Toprol XL 50 mg QD, Imdur 30 mg QD

## 2024-07-18 NOTE — PROGRESS NOTE ADULT - NS ATTEND AMEND GEN_ALL_CORE FT
44 y/o obese Female, who ambulates with cane,  w/ PMHx  SLE (on plaquinel), HTN, anemia (baseline Hgb 10), heavy menstrual cycles (saturated 12-14 tampons per 24 hours, OBGYN recommended Mirena IUD), uterine fibroid, L ovary cyst, spinal stenosis, and hx STEMI CAD s/p C 2/2/24 @[facility] s/p PCI  LI pLAD 100% occlusion via RRA (on ASA, Brilinta), HFrEF  TTE post cath EF 30-35% severe hypokinesis  2/2 MI (started GDMT Entresto 24-26mg bid, Metoprolol 25mg bid, Farxiga 10mg daily and Spironolactone 25mg daily)  presents to [facility] ER this evening, 7/16/24, complaining of intermittent, left-sided chest pressure radiating to left shoulder associated with nausea after ambulating           , symptoms approves with rest.   Patient admitted to [facility] for Unstable Angina.    1. atypical chest pain  Known CAD, recent PCI LAD, negative troponin, Hb low due to menstrual cycle. EKG without STEMI.  Nuclear stress test today.    During non face-to-face time, I reviewed relevant portions of the patient’s medical record; including vitals, labs, medications, cardiac studies, remaining additional imaging and consultant recommendations. During face-to-face time, I took a relevant history and examined the patient. I also explained to the patient their diagnoses, and specific cardiopulmonary management plan, which required a high level of medical decision making.  I answered all questions related to the patient's medical conditions. I spent 55 minutes on total encounter; more than 50% of the visit was spent counseling and/or coordinating care by the attending physician, with plan of care discussed with the patient,  and cardiac care team.
44 y/o obese Female, who ambulates with cane,  w/ PMHx  SLE (on plaquinel), HTN, anemia (baseline Hgb 10), heavy menstrual cycles (saturated 12-14 tampons per 24 hours, OBGYN recommended Mirena IUD), uterine fibroid, L ovary cyst, spinal stenosis, and hx STEMI CAD s/p C 2/2/24 @[facility] s/p PCI  LI pLAD 100% occlusion via RRA (on ASA, Brilinta), HFrEF  TTE post cath EF 30-35% severe hypokinesis  2/2 MI (started GDMT Entresto 24-26mg bid, Metoprolol 25mg bid, Farxiga 10mg daily and Spironolactone 25mg daily)  presents to [facility] ER this evening, 7/16/24, complaining of intermittent, left-sided chest pressure radiating to left shoulder associated with nausea after ambulating           , symptoms approves with rest.   Patient admitted to [facility] for Unstable Angina.    1. atypical chest pain  Known CAD, recent PCI LAD, negative troponin, Hb low due to menstrual cycle. EKG without STEMI.  Nuclear stress test for further evaluation  echo.    During non face-to-face time, I reviewed relevant portions of the patient’s medical record; including vitals, labs, medications, cardiac studies, remaining additional imaging and consultant recommendations. During face-to-face time, I took a relevant history and examined the patient. I also explained to the patient their diagnoses, and specific cardiopulmonary management plan, which required a high level of medical decision making.  I answered all questions related to the patient's medical conditions. I spent 55 minutes on total encounter; more than 50% of the visit was spent counseling and/or coordinating care by the attending physician, with plan of care discussed with the patient,  and cardiac care team.

## 2024-07-18 NOTE — PROGRESS NOTE ADULT - SUBJECTIVE AND OBJECTIVE BOX
Interventional Cardiology PA Adult Progress Note    Subjective Assessment: Patient seen and examined at bedside. Still w/ CP but is mild. Denies SOB at rest, orthopnea, f/c/n/v/d.   	  MEDICATIONS:  metoprolol tartrate 25 milliGRAM(s) Oral every 12 hours  sacubitril 24 mG/valsartan 26 mG 1 Tablet(s) Oral every 12 hours  spironolactone 25 milliGRAM(s) Oral daily    hydroxychloroquine 200 milliGRAM(s) Oral every 12 hours    ipratropium    for Nebulization 500 MICROGram(s) Nebulizer every 6 hours PRN    acetaminophen     Tablet .. 650 milliGRAM(s) Oral every 6 hours PRN      atorvastatin 80 milliGRAM(s) Oral at bedtime  dapagliflozin 10 milliGRAM(s) Oral daily    aspirin enteric coated 81 milliGRAM(s) Oral daily  ticagrelor 90 milliGRAM(s) Oral every 12 hours        [PHYSICAL EXAM:  TELEMETRY: sinus bradycardia  T(C): 36.8 (07-18-24 @ 06:30), Max: 37.3 (07-17-24 @ 18:01)  HR: 60 (07-18-24 @ 08:54) (55 - 83)  BP: 109/59 (07-18-24 @ 08:54) (93/51 - 128/61)  RR: 18 (07-18-24 @ 08:54) (16 - 18)  SpO2: 98% (07-18-24 @ 08:54) (96% - 100%)  Wt(kg): --  I&O's Summary    17 Jul 2024 07:01  -  18 Jul 2024 07:00  --------------------------------------------------------  IN: 0 mL / OUT: 500 mL / NET: -500 mL      Appearance: Pt seen in bed in NAD 	  HEENT:   Normal oral mucosa, PERRL, EOMI	  Neck: Supple  Cardiovascular: Normal S1 S2, No JVD, No murmurs  Respiratory: Lungs clear to auscultation b/l; No Rales, Rhonchi, Wheezing	  Gastrointestinal:  Soft, Non-tender, + BS	  Skin: No rashes, No ecchymoses, No cyanosis  Extremities: Normal range of motion, No clubbing, cyanosis. trace BL LE edema  Vascular: Peripheral pulses palpable 1+ bilaterally DP/PT, 2+ radial  Neurologic: Non-focal  Psychiatry: A & O x 3, Mood & affect appropriate  	    LABS:	 	  CARDIAC MARKERS:                            9.3    3.44  )-----------( 240      ( 18 Jul 2024 05:30 )             32.6     07-18    137  |  104  |  13  ----------------------------<  118<H>  4.4   |  22  |  0.95    Ca    9.2      18 Jul 2024 05:30  Mg     1.8     07-18    TPro  8.5<H>  /  Alb  4.1  /  TBili  0.5  /  DBili  x   /  AST  19  /  ALT  17  /  AlkPhos  75  07-18    proBNP:   Lipid Profile:   HgA1c:   TSH:   PT/INR - ( 18 Jul 2024 05:30 )   PT: 11.4 sec;   INR: 1.00          PTT - ( 18 Jul 2024 05:30 )  PTT:30.3 sec

## 2024-07-18 NOTE — PROGRESS NOTE ADULT - PROBLEM SELECTOR PLAN 5
baseline Hgb 10, H/H on admission 9.2/30.0 Plts 247  -No clinical signs or symptoms of bleeding   -Continue to monitor CBC      FULL CODE  DVT Prophylaxis: Lovenox 40 mg SQ Q12H, ambulate as tolerated  Case discussed with Dr. Gonzalez.

## 2024-07-18 NOTE — DISCHARGE NOTE PROVIDER - NSDCCPCAREPLAN_GEN_ALL_CORE_FT
PRINCIPAL DISCHARGE DIAGNOSIS  Diagnosis: Chest pain  Assessment and Plan of Treatment: You presented with chest pain symptoms. You underwent an echocardiogram revealing your heart function improved to an EF of 45-50% with some wall motion abnormalities in your heart that were seen on previous echo from your prior heart attack. You had a nuclear stress test which did not reveal any new ischemia to reflect new blockages that are contributing to your symptoms. We started a medication called imdur 30 mg daily that you will take to help improve any chest pain symptoms. You were also switched from lopressor to long acting Toprol XL 50 mg once a day. You will follow up with Dr. Montgomery at your appointment made for 7/24 3:20PM on [address] [place] [address]. Phone number for appointment is [phone].      SECONDARY DISCHARGE DIAGNOSES  Diagnosis: CAD (coronary artery disease)  Assessment and Plan of Treatment: Please continue your aspirin 81 mg daily and brilinta 90 mg twice daily for your history of heart attack with stents.    Diagnosis: HFrEF (heart failure with reduced ejection fraction)  Assessment and Plan of Treatment: Please resume all your home medications including entresto, spironolactone and farxiga. The lopressor medication will be stopped and you will start taking Toprol XL 50mg once daily.    Diagnosis: SLE (systemic lupus erythematosus)  Assessment and Plan of Treatment: Please continue your lupus medications and follow up with your rheumatologist.

## 2024-07-18 NOTE — DISCHARGE NOTE NURSING/CASE MANAGEMENT/SOCIAL WORK - PATIENT PORTAL LINK FT
You can access the FollowMyHealth Patient Portal offered by [facility] by registering at the following website: [email or web address]. By joining [employer]’s FollowMyHealth portal, you will also be able to view your health information using other applications (apps) compatible with our system.

## 2024-07-18 NOTE — PROGRESS NOTE ADULT - PROBLEM SELECTOR PLAN 2
Warm and euvolemic on exam   - CXR 7/16: No infiltrate pleural effusion or ptx. Unremarkable cardiac silhouette. No acute bone abnormality.  - Pro- (7/17)  - Prior TTE: EF 30-35% w/  severe hypokinesis 2/2 MI (2/2024)  - Repeat TTE improved as detailed above  - Diuretics: euvolemic off diuretic  - GDMT: Entresto 24-26mg bid, Metoprolol 25mg bid, Farxiga 10mg daily and Spironolactone 25mg daily  - Core Measures. Strict I/Os and Daily Weights

## 2024-07-18 NOTE — DISCHARGE NOTE PROVIDER - CARE PROVIDER_API CALL
Bentley Montgomery  Cardiology  [address] [place] [address]  Phone: [phone]  Fax: [phone]  Established Patient  Follow Up Time: 1 week

## 2024-07-23 ENCOUNTER — NON-APPOINTMENT (OUTPATIENT)
Age: 44
End: 2024-07-23

## 2024-07-24 ENCOUNTER — APPOINTMENT (OUTPATIENT)
Dept: HEART AND VASCULAR | Facility: CLINIC | Age: 44
End: 2024-07-24

## 2024-07-24 ENCOUNTER — NON-APPOINTMENT (OUTPATIENT)
Age: 44
End: 2024-07-24

## 2024-07-24 VITALS
BODY MASS INDEX: 44.82 KG/M2 | DIASTOLIC BLOOD PRESSURE: 68 MMHG | OXYGEN SATURATION: 99 % | HEIGHT: 65 IN | WEIGHT: 269 LBS | HEART RATE: 79 BPM | TEMPERATURE: 97.5 F | SYSTOLIC BLOOD PRESSURE: 106 MMHG

## 2024-07-24 DIAGNOSIS — E78.5 HYPERLIPIDEMIA, UNSPECIFIED: ICD-10-CM

## 2024-07-24 DIAGNOSIS — I25.10 ATHEROSCLEROTIC HEART DISEASE OF NATIVE CORONARY ARTERY W/OUT ANGINA PECTORIS: ICD-10-CM

## 2024-07-24 DIAGNOSIS — Z95.5 PRESENCE OF CORONARY ANGIOPLASTY IMPLANT AND GRAFT: ICD-10-CM

## 2024-07-24 DIAGNOSIS — I50.21 ACUTE SYSTOLIC (CONGESTIVE) HEART FAILURE: ICD-10-CM

## 2024-07-24 DIAGNOSIS — I10 ESSENTIAL (PRIMARY) HYPERTENSION: ICD-10-CM

## 2024-07-24 PROCEDURE — 93000 ELECTROCARDIOGRAM COMPLETE: CPT

## 2024-07-24 PROCEDURE — G2211 COMPLEX E/M VISIT ADD ON: CPT | Mod: NC,1L

## 2024-07-24 PROCEDURE — 99214 OFFICE O/P EST MOD 30 MIN: CPT | Mod: 25

## 2024-07-24 RX ORDER — METOPROLOL SUCCINATE 50 MG/1
50 TABLET, EXTENDED RELEASE ORAL DAILY
Refills: 0 | Status: ACTIVE | COMMUNITY

## 2024-07-24 RX ORDER — ISOSORBIDE MONONITRATE 30 MG/1
30 TABLET, EXTENDED RELEASE ORAL DAILY
Refills: 0 | Status: ACTIVE | COMMUNITY

## 2024-07-24 NOTE — ASSESSMENT
[FreeTextEntry1] : EKG NSR low voltage QRS   A/P 43 y/o F w/ PMH SLE (on plaquinel), HTN, anemia, uterine fibroid with recent hospitalization for STEMI s/p PCI with stent placed in pLAD due to 100% occlusion and newly diagnosed HFrEF (30-35%--> 40%) in the setting of MI who is presenting for follow up.  #CAD, native heart, native vessel, without angina #STEMI , subsequent visit # stent, LI in LAD c/w ASA and Brillinta c/w Lipitor 80mg LDL goal <70.C c/w cardiac rehab  # HFrEF with improvement in EF Taken off Eliquis last visit as no thrombus seen c/w Entresto 24/26mg BID, Lopressor 25mg BID, nikolai 25mg qd Will check BMP today after start Farxiga last time repeat echo on GDMT LVEF 45-50 continue GDMT No indication for ICD at this time   # hyperlipidemia, target LDL< 70 LDL 48  #SLE c/w Plaquenil.

## 2024-07-24 NOTE — HISTORY OF PRESENT ILLNESS
[FreeTextEntry1] : f/u for 42 y/o F w/ PMH SLE (on plaquinel), HTN, anemia, uterine fibroid with recent hospitalization for STEMI s/p PCI with stent placed in pLAD due to 100% occlusion and newly diagnosed HFrEF (30-35%--> 40%) in the setting of MI who is presenting for follow up. Since last visit, she has been feeling well. She has been doing cardiac rehab twice a week without issues. Had recent ER visit for chest pain. Was ruled out for dissection. Since then, no further episodes of chest pain. Denies exertional symptoms. Overall, she has been getting around more. She tries to be active. She uses a cane due to spinal stenosis. Denies peripheral edema, orthopnea, PND.  Since last visit, exercise tolerance has increased to 2 blocks, able to climb 20 stairs  ROS back pain better w gabapentin

## 2024-07-26 DIAGNOSIS — E66.9 OBESITY, UNSPECIFIED: ICD-10-CM

## 2024-07-26 DIAGNOSIS — Z79.82 LONG TERM (CURRENT) USE OF ASPIRIN: ICD-10-CM

## 2024-07-26 DIAGNOSIS — I25.110 ATHEROSCLEROTIC HEART DISEASE OF NATIVE CORONARY ARTERY WITH UNSTABLE ANGINA PECTORIS: ICD-10-CM

## 2024-07-26 DIAGNOSIS — Z95.5 PRESENCE OF CORONARY ANGIOPLASTY IMPLANT AND GRAFT: ICD-10-CM

## 2024-07-26 DIAGNOSIS — Z79.899 OTHER LONG TERM (CURRENT) DRUG THERAPY: ICD-10-CM

## 2024-07-26 DIAGNOSIS — R07.9 CHEST PAIN, UNSPECIFIED: ICD-10-CM

## 2024-07-26 DIAGNOSIS — I11.0 HYPERTENSIVE HEART DISEASE WITH HEART FAILURE: ICD-10-CM

## 2024-07-26 DIAGNOSIS — I50.22 CHRONIC SYSTOLIC (CONGESTIVE) HEART FAILURE: ICD-10-CM

## 2024-07-26 DIAGNOSIS — D64.9 ANEMIA, UNSPECIFIED: ICD-10-CM

## 2024-07-26 DIAGNOSIS — Z79.01 LONG TERM (CURRENT) USE OF ANTICOAGULANTS: ICD-10-CM

## 2024-07-26 DIAGNOSIS — M32.9 SYSTEMIC LUPUS ERYTHEMATOSUS, UNSPECIFIED: ICD-10-CM

## 2024-07-26 DIAGNOSIS — E78.5 HYPERLIPIDEMIA, UNSPECIFIED: ICD-10-CM

## 2024-08-05 ENCOUNTER — APPOINTMENT (OUTPATIENT)
Dept: HEART AND VASCULAR | Facility: CLINIC | Age: 44
End: 2024-08-05

## 2024-08-13 ENCOUNTER — RX RENEWAL (OUTPATIENT)
Age: 44
End: 2024-08-13

## 2024-09-03 ENCOUNTER — TRANSCRIPTION ENCOUNTER (OUTPATIENT)
Age: 44
End: 2024-09-03

## 2024-10-09 ENCOUNTER — NON-APPOINTMENT (OUTPATIENT)
Age: 44
End: 2024-10-09

## 2024-10-09 ENCOUNTER — APPOINTMENT (OUTPATIENT)
Dept: HEART AND VASCULAR | Facility: CLINIC | Age: 44
End: 2024-10-09
Payer: MEDICAID

## 2024-10-09 VITALS
DIASTOLIC BLOOD PRESSURE: 60 MMHG | HEIGHT: 65 IN | RESPIRATION RATE: 16 BRPM | TEMPERATURE: 98.8 F | SYSTOLIC BLOOD PRESSURE: 92 MMHG | BODY MASS INDEX: 46.48 KG/M2 | OXYGEN SATURATION: 98 % | WEIGHT: 279 LBS | HEART RATE: 75 BPM

## 2024-10-09 DIAGNOSIS — I25.10 ATHEROSCLEROTIC HEART DISEASE OF NATIVE CORONARY ARTERY W/OUT ANGINA PECTORIS: ICD-10-CM

## 2024-10-09 DIAGNOSIS — E78.5 HYPERLIPIDEMIA, UNSPECIFIED: ICD-10-CM

## 2024-10-09 DIAGNOSIS — Z95.5 PRESENCE OF CORONARY ANGIOPLASTY IMPLANT AND GRAFT: ICD-10-CM

## 2024-10-09 DIAGNOSIS — I10 ESSENTIAL (PRIMARY) HYPERTENSION: ICD-10-CM

## 2024-10-09 DIAGNOSIS — I21.3 ST ELEVATION (STEMI) MYOCARDIAL INFARCTION OF UNSPECIFIED SITE: ICD-10-CM

## 2024-10-09 DIAGNOSIS — I50.21 ACUTE SYSTOLIC (CONGESTIVE) HEART FAILURE: ICD-10-CM

## 2024-10-09 DIAGNOSIS — R29.818 OTHER SYMPTOMS AND SIGNS INVOLVING THE NERVOUS SYSTEM: ICD-10-CM

## 2024-10-09 PROCEDURE — 93000 ELECTROCARDIOGRAM COMPLETE: CPT

## 2024-10-09 PROCEDURE — 99214 OFFICE O/P EST MOD 30 MIN: CPT

## 2024-10-09 PROCEDURE — G2211 COMPLEX E/M VISIT ADD ON: CPT | Mod: NC

## 2024-11-07 ENCOUNTER — APPOINTMENT (OUTPATIENT)
Dept: HEART AND VASCULAR | Facility: CLINIC | Age: 44
End: 2024-11-07
Payer: MEDICAID

## 2024-11-07 PROCEDURE — 95800 SLP STDY UNATTENDED: CPT | Mod: TC

## 2024-11-21 ENCOUNTER — TRANSCRIPTION ENCOUNTER (OUTPATIENT)
Age: 44
End: 2024-11-21

## 2024-12-03 ENCOUNTER — EMERGENCY (EMERGENCY)
Facility: HOSPITAL | Age: 44
LOS: 1 days | Discharge: HOME | End: 2024-12-03
Attending: EMERGENCY MEDICINE | Admitting: EMERGENCY MEDICINE
Payer: COMMERCIAL

## 2024-12-03 DIAGNOSIS — Z98.890 OTHER SPECIFIED POSTPROCEDURAL STATES: Chronic | ICD-10-CM

## 2024-12-03 PROCEDURE — 99285 EMERGENCY DEPT VISIT HI MDM: CPT

## 2024-12-04 VITALS
SYSTOLIC BLOOD PRESSURE: 105 MMHG | DIASTOLIC BLOOD PRESSURE: 70 MMHG | RESPIRATION RATE: 18 BRPM | OXYGEN SATURATION: 98 % | TEMPERATURE: 100 F | HEART RATE: 92 BPM

## 2024-12-04 VITALS
WEIGHT: 270.95 LBS | HEART RATE: 84 BPM | RESPIRATION RATE: 18 BRPM | OXYGEN SATURATION: 97 % | TEMPERATURE: 102 F | SYSTOLIC BLOOD PRESSURE: 115 MMHG | DIASTOLIC BLOOD PRESSURE: 75 MMHG

## 2024-12-04 LAB
ANION GAP SERPL CALC-SCNC: 11 MMOL/L — SIGNIFICANT CHANGE UP (ref 5–17)
ANION GAP SERPL CALC-SCNC: 12 MMOL/L — SIGNIFICANT CHANGE UP (ref 5–17)
BASOPHILS # BLD AUTO: 0.02 K/UL — SIGNIFICANT CHANGE UP (ref 0–0.2)
BASOPHILS NFR BLD AUTO: 0.4 % — SIGNIFICANT CHANGE UP (ref 0–2)
BUN SERPL-MCNC: 14 MG/DL — SIGNIFICANT CHANGE UP (ref 7–23)
BUN SERPL-MCNC: 15 MG/DL — SIGNIFICANT CHANGE UP (ref 7–23)
CALCIUM SERPL-MCNC: 8.6 MG/DL — SIGNIFICANT CHANGE UP (ref 8.4–10.5)
CALCIUM SERPL-MCNC: 9 MG/DL — SIGNIFICANT CHANGE UP (ref 8.4–10.5)
CHLORIDE SERPL-SCNC: 100 MMOL/L — SIGNIFICANT CHANGE UP (ref 96–108)
CHLORIDE SERPL-SCNC: 98 MMOL/L — SIGNIFICANT CHANGE UP (ref 96–108)
CO2 SERPL-SCNC: 18 MMOL/L — LOW (ref 22–31)
CO2 SERPL-SCNC: 18 MMOL/L — LOW (ref 22–31)
CREAT SERPL-MCNC: 0.92 MG/DL — SIGNIFICANT CHANGE UP (ref 0.5–1.3)
CREAT SERPL-MCNC: 1.03 MG/DL — SIGNIFICANT CHANGE UP (ref 0.5–1.3)
EGFR: 69 ML/MIN/1.73M2 — SIGNIFICANT CHANGE UP
EGFR: 79 ML/MIN/1.73M2 — SIGNIFICANT CHANGE UP
EOSINOPHIL # BLD AUTO: 0.01 K/UL — SIGNIFICANT CHANGE UP (ref 0–0.5)
EOSINOPHIL NFR BLD AUTO: 0.2 % — SIGNIFICANT CHANGE UP (ref 0–6)
FLUAV AG NPH QL: SIGNIFICANT CHANGE UP
FLUBV AG NPH QL: SIGNIFICANT CHANGE UP
GLUCOSE SERPL-MCNC: 116 MG/DL — HIGH (ref 70–99)
GLUCOSE SERPL-MCNC: 128 MG/DL — HIGH (ref 70–99)
HCT VFR BLD CALC: 31.5 % — LOW (ref 34.5–45)
HGB BLD-MCNC: 9.5 G/DL — LOW (ref 11.5–15.5)
IMM GRANULOCYTES NFR BLD AUTO: 0.2 % — SIGNIFICANT CHANGE UP (ref 0–0.9)
LACTATE SERPL-SCNC: 0.9 MMOL/L — SIGNIFICANT CHANGE UP (ref 0.5–2)
LYMPHOCYTES # BLD AUTO: 1 K/UL — SIGNIFICANT CHANGE UP (ref 1–3.3)
LYMPHOCYTES # BLD AUTO: 18.2 % — SIGNIFICANT CHANGE UP (ref 13–44)
MCHC RBC-ENTMCNC: 21 PG — LOW (ref 27–34)
MCHC RBC-ENTMCNC: 30.2 G/DL — LOW (ref 32–36)
MCV RBC AUTO: 69.5 FL — LOW (ref 80–100)
MONOCYTES # BLD AUTO: 0.82 K/UL — SIGNIFICANT CHANGE UP (ref 0–0.9)
MONOCYTES NFR BLD AUTO: 14.9 % — HIGH (ref 2–14)
NEUTROPHILS # BLD AUTO: 3.64 K/UL — SIGNIFICANT CHANGE UP (ref 1.8–7.4)
NEUTROPHILS NFR BLD AUTO: 66.1 % — SIGNIFICANT CHANGE UP (ref 43–77)
NRBC # BLD: 0 /100 WBCS — SIGNIFICANT CHANGE UP (ref 0–0)
PLATELET # BLD AUTO: 209 K/UL — SIGNIFICANT CHANGE UP (ref 150–400)
POTASSIUM SERPL-MCNC: 3.2 MMOL/L — LOW (ref 3.5–5.3)
POTASSIUM SERPL-MCNC: 3.6 MMOL/L — SIGNIFICANT CHANGE UP (ref 3.5–5.3)
POTASSIUM SERPL-SCNC: 3.2 MMOL/L — LOW (ref 3.5–5.3)
POTASSIUM SERPL-SCNC: 3.6 MMOL/L — SIGNIFICANT CHANGE UP (ref 3.5–5.3)
RBC # BLD: 4.53 M/UL — SIGNIFICANT CHANGE UP (ref 3.8–5.2)
RBC # FLD: 18.2 % — HIGH (ref 10.3–14.5)
RSV RNA NPH QL NAA+NON-PROBE: SIGNIFICANT CHANGE UP
SARS-COV-2 RNA SPEC QL NAA+PROBE: SIGNIFICANT CHANGE UP
SODIUM SERPL-SCNC: 128 MMOL/L — LOW (ref 135–145)
SODIUM SERPL-SCNC: 129 MMOL/L — LOW (ref 135–145)
TROPONIN T, HIGH SENSITIVITY RESULT: 9 NG/L — SIGNIFICANT CHANGE UP (ref 0–51)
TROPONIN T, HIGH SENSITIVITY RESULT: 9 NG/L — SIGNIFICANT CHANGE UP (ref 0–51)
WBC # BLD: 5.5 K/UL — SIGNIFICANT CHANGE UP (ref 3.8–10.5)
WBC # FLD AUTO: 5.5 K/UL — SIGNIFICANT CHANGE UP (ref 3.8–10.5)

## 2024-12-04 PROCEDURE — 36415 COLL VENOUS BLD VENIPUNCTURE: CPT

## 2024-12-04 PROCEDURE — 71046 X-RAY EXAM CHEST 2 VIEWS: CPT

## 2024-12-04 PROCEDURE — 87637 SARSCOV2&INF A&B&RSV AMP PRB: CPT

## 2024-12-04 PROCEDURE — 80048 BASIC METABOLIC PNL TOTAL CA: CPT

## 2024-12-04 PROCEDURE — 83605 ASSAY OF LACTIC ACID: CPT

## 2024-12-04 PROCEDURE — 99284 EMERGENCY DEPT VISIT MOD MDM: CPT | Mod: 25

## 2024-12-04 PROCEDURE — 71046 X-RAY EXAM CHEST 2 VIEWS: CPT | Mod: 26

## 2024-12-04 PROCEDURE — 87040 BLOOD CULTURE FOR BACTERIA: CPT

## 2024-12-04 PROCEDURE — 96374 THER/PROPH/DIAG INJ IV PUSH: CPT

## 2024-12-04 PROCEDURE — 85025 COMPLETE CBC W/AUTO DIFF WBC: CPT

## 2024-12-04 PROCEDURE — 84484 ASSAY OF TROPONIN QUANT: CPT

## 2024-12-04 PROCEDURE — 96375 TX/PRO/DX INJ NEW DRUG ADDON: CPT

## 2024-12-04 RX ORDER — KETOROLAC TROMETHAMINE 30 MG/ML
15 INJECTION INTRAMUSCULAR; INTRAVENOUS ONCE
Refills: 0 | Status: DISCONTINUED | OUTPATIENT
Start: 2024-12-04 | End: 2024-12-04

## 2024-12-04 RX ORDER — 0.9 % SODIUM CHLORIDE 0.9 %
1000 INTRAVENOUS SOLUTION INTRAVENOUS ONCE
Refills: 0 | Status: COMPLETED | OUTPATIENT
Start: 2024-12-04 | End: 2024-12-04

## 2024-12-04 RX ORDER — POTASSIUM CHLORIDE 600 MG/1
40 TABLET, EXTENDED RELEASE ORAL ONCE
Refills: 0 | Status: COMPLETED | OUTPATIENT
Start: 2024-12-04 | End: 2024-12-04

## 2024-12-04 RX ORDER — METOCLOPRAMIDE HYDROCHLORIDE 10 MG/1
10 TABLET ORAL ONCE
Refills: 0 | Status: COMPLETED | OUTPATIENT
Start: 2024-12-04 | End: 2024-12-04

## 2024-12-04 RX ORDER — ACETAMINOPHEN 500MG 500 MG/1
650 TABLET, COATED ORAL ONCE
Refills: 0 | Status: COMPLETED | OUTPATIENT
Start: 2024-12-04 | End: 2024-12-04

## 2024-12-04 RX ORDER — METHOCARBAMOL 500 MG/1
500 TABLET, FILM COATED ORAL ONCE
Refills: 0 | Status: COMPLETED | OUTPATIENT
Start: 2024-12-04 | End: 2024-12-04

## 2024-12-04 RX ADMIN — POTASSIUM CHLORIDE 40 MILLIEQUIVALENT(S): 600 TABLET, EXTENDED RELEASE ORAL at 05:23

## 2024-12-04 RX ADMIN — METOCLOPRAMIDE HYDROCHLORIDE 104 MILLIGRAM(S): 10 TABLET ORAL at 02:52

## 2024-12-04 RX ADMIN — ACETAMINOPHEN 500MG 650 MILLIGRAM(S): 500 TABLET, COATED ORAL at 02:16

## 2024-12-04 RX ADMIN — KETOROLAC TROMETHAMINE 15 MILLIGRAM(S): 30 INJECTION INTRAMUSCULAR; INTRAVENOUS at 02:16

## 2024-12-04 RX ADMIN — METHOCARBAMOL 500 MILLIGRAM(S): 500 TABLET, FILM COATED ORAL at 05:23

## 2024-12-04 RX ADMIN — Medication 1000 MILLILITER(S): at 02:51

## 2024-12-04 NOTE — ED PROVIDER NOTE - PHYSICAL EXAMINATION
CONST: nontoxic NAD speaking in full sentences  HEAD: atraumatic  EYES: conjunctivae clear  NECK: supple  CARD: regular rate  CHEST: breathing comfortably, no stridor/retractions/tripoding, clear BS  ABD: soft nontender nondistended  EXT: FROM  SKIN: warm, dry  NEURO: awake alert answering questions following commands moving all extremities

## 2024-12-04 NOTE — ED PROVIDER NOTE - OBJECTIVE STATEMENT
44yF w/ SLE on plaquenil, CAD s/p MI and stents in Feb 2024,  HTN, anemia, ICM w/ EF 45-50% 7/2024, presents for eval. Since yesterday fever chills headache rhinorrhea diarrhea and tonight some chest tightness. No abd pain, vomiting, SOB, cough.

## 2024-12-04 NOTE — ED PROVIDER NOTE - PATIENT PORTAL LINK FT
You can access the FollowMyHealth Patient Portal offered by [facility] by registering at the following website: [email or web address]. By joining [employer]’s FollowMyHealth portal, you will also be able to view your health information using other applications (apps) compatible with our system.

## 2024-12-04 NOTE — ED PROVIDER NOTE - CLINICAL SUMMARY MEDICAL DECISION MAKING FREE TEXT BOX
Triage VS febrile 101.7 otherwise normal  not septic  exam reassuring and normal  suspect viral syndrome  EKG NSR non ischemic  check labs and CXR, viral swab, antipyretics, iv fluids

## 2024-12-04 NOTE — ED ADULT NURSE NOTE - OBJECTIVE STATEMENT
Received patient via stretcher BIBEMS with chief complaint of chest pain. Said complaint accompanied with fever, intermittent. Denies cough and colds, vomiting, diarrhea.  On PE, AOX4, speaking full sentences without difficulty. Patient not in active cardiac or respiratory distress, no noted neurologic deficits.  No obvious trauma/injury/deformity noted. Patient oriented to ED area. All needs attended. POC reviewed. Purposeful proactive hourly rounding in progress.

## 2024-12-04 NOTE — ED ADULT NURSE NOTE - NSFALLUNIVINTERV_ED_ALL_ED
Bed/Stretcher in lowest position, wheels locked, appropriate side rails in place/Call bell, personal items and telephone in reach/Instruct patient to call for assistance before getting out of bed/chair/stretcher/Non-slip footwear applied when patient is off stretcher/[place] to call system/Physically safe environment - no spills, clutter or unnecessary equipment/Purposeful proactive rounding/Room/bathroom lighting operational, light cord in reach follow up at [facility]

## 2024-12-04 NOTE — ED PROVIDER NOTE - PROGRESS NOTE DETAILS
zina - received on sign out pending rpt bmp after fluids.   after 1L fluids, sodium 129, K+ 3.2   given oral repletion. potassium. still mild headache but overall improved compared to initially. headache and fever but no concern on my evaluation for meningitis - neck supple, no meningismus. ordered for robaxin in case tension ha.   vitals stable. overall well appearing, toelrating po  ok for dc. pt comfortable w plan.     All results reviewed with the patient verbally. Discharge plan and return precautions d/w pt who verbalized understanding and agrees with plan. All questions answered. Vitals WNL. Ready for d/c.

## 2024-12-05 DIAGNOSIS — I25.10 ATHEROSCLEROTIC HEART DISEASE OF NATIVE CORONARY ARTERY WITHOUT ANGINA PECTORIS: ICD-10-CM

## 2024-12-05 DIAGNOSIS — I10 ESSENTIAL (PRIMARY) HYPERTENSION: ICD-10-CM

## 2024-12-05 DIAGNOSIS — Z86.2 PERSONAL HISTORY OF DISEASES OF THE BLOOD AND BLOOD-FORMING ORGANS AND CERTAIN DISORDERS INVOLVING THE IMMUNE MECHANISM: ICD-10-CM

## 2024-12-05 DIAGNOSIS — R50.9 FEVER, UNSPECIFIED: ICD-10-CM

## 2024-12-05 DIAGNOSIS — M32.9 SYSTEMIC LUPUS ERYTHEMATOSUS, UNSPECIFIED: ICD-10-CM

## 2024-12-05 DIAGNOSIS — Z95.5 PRESENCE OF CORONARY ANGIOPLASTY IMPLANT AND GRAFT: ICD-10-CM

## 2024-12-05 DIAGNOSIS — B34.9 VIRAL INFECTION, UNSPECIFIED: ICD-10-CM

## 2024-12-05 DIAGNOSIS — I25.2 OLD MYOCARDIAL INFARCTION: ICD-10-CM

## 2024-12-05 DIAGNOSIS — R07.89 OTHER CHEST PAIN: ICD-10-CM

## 2024-12-08 VITALS
DIASTOLIC BLOOD PRESSURE: 61 MMHG | HEIGHT: 67 IN | WEIGHT: 266.98 LBS | SYSTOLIC BLOOD PRESSURE: 94 MMHG | OXYGEN SATURATION: 99 % | HEART RATE: 80 BPM | RESPIRATION RATE: 18 BRPM | TEMPERATURE: 98 F

## 2024-12-08 PROCEDURE — 93010 ELECTROCARDIOGRAM REPORT: CPT

## 2024-12-08 PROCEDURE — 99291 CRITICAL CARE FIRST HOUR: CPT

## 2024-12-08 NOTE — ED ADULT TRIAGE NOTE - CHIEF COMPLAINT QUOTE
Pt reports SOB and cough. Pt also reports 1 episode of diarrhea. Pt has hx of CHF, 2MIs and cardiac stents.

## 2024-12-09 ENCOUNTER — INPATIENT (INPATIENT)
Facility: HOSPITAL | Age: 44
LOS: 1 days | Discharge: HOME HEALTH | End: 2024-12-11
Attending: STUDENT IN AN ORGANIZED HEALTH CARE EDUCATION/TRAINING PROGRAM | Admitting: STUDENT IN AN ORGANIZED HEALTH CARE EDUCATION/TRAINING PROGRAM
Payer: COMMERCIAL

## 2024-12-09 DIAGNOSIS — D64.9 ANEMIA, UNSPECIFIED: ICD-10-CM

## 2024-12-09 DIAGNOSIS — M32.9 SYSTEMIC LUPUS ERYTHEMATOSUS, UNSPECIFIED: ICD-10-CM

## 2024-12-09 DIAGNOSIS — I50.22 CHRONIC SYSTOLIC (CONGESTIVE) HEART FAILURE: ICD-10-CM

## 2024-12-09 DIAGNOSIS — Z29.9 ENCOUNTER FOR PROPHYLACTIC MEASURES, UNSPECIFIED: ICD-10-CM

## 2024-12-09 DIAGNOSIS — Z98.890 OTHER SPECIFIED POSTPROCEDURAL STATES: Chronic | ICD-10-CM

## 2024-12-09 DIAGNOSIS — R06.00 DYSPNEA, UNSPECIFIED: ICD-10-CM

## 2024-12-09 LAB
ADD ON TEST-SPECIMEN IN LAB: SIGNIFICANT CHANGE UP
ALBUMIN SERPL ELPH-MCNC: 3.6 G/DL — SIGNIFICANT CHANGE UP (ref 3.3–5)
ALBUMIN SERPL ELPH-MCNC: 3.9 G/DL — SIGNIFICANT CHANGE UP (ref 3.3–5)
ALP SERPL-CCNC: 60 U/L — SIGNIFICANT CHANGE UP (ref 40–120)
ALP SERPL-CCNC: 68 U/L — SIGNIFICANT CHANGE UP (ref 40–120)
ALT FLD-CCNC: 29 U/L — SIGNIFICANT CHANGE UP (ref 10–45)
ALT FLD-CCNC: 32 U/L — SIGNIFICANT CHANGE UP (ref 10–45)
ANION GAP SERPL CALC-SCNC: 11 MMOL/L — SIGNIFICANT CHANGE UP (ref 5–17)
ANION GAP SERPL CALC-SCNC: 11 MMOL/L — SIGNIFICANT CHANGE UP (ref 5–17)
AST SERPL-CCNC: 34 U/L — SIGNIFICANT CHANGE UP (ref 10–40)
AST SERPL-CCNC: 35 U/L — SIGNIFICANT CHANGE UP (ref 10–40)
BASOPHILS # BLD AUTO: 0 K/UL — SIGNIFICANT CHANGE UP (ref 0–0.2)
BASOPHILS # BLD AUTO: 0 K/UL — SIGNIFICANT CHANGE UP (ref 0–0.2)
BASOPHILS NFR BLD AUTO: 0 % — SIGNIFICANT CHANGE UP (ref 0–2)
BASOPHILS NFR BLD AUTO: 0 % — SIGNIFICANT CHANGE UP (ref 0–2)
BILIRUB SERPL-MCNC: 0.3 MG/DL — SIGNIFICANT CHANGE UP (ref 0.2–1.2)
BILIRUB SERPL-MCNC: 0.4 MG/DL — SIGNIFICANT CHANGE UP (ref 0.2–1.2)
BLD GP AB SCN SERPL QL: NEGATIVE — SIGNIFICANT CHANGE UP
BUN SERPL-MCNC: 29 MG/DL — HIGH (ref 7–23)
BUN SERPL-MCNC: 32 MG/DL — HIGH (ref 7–23)
CALCIUM SERPL-MCNC: 8.9 MG/DL — SIGNIFICANT CHANGE UP (ref 8.4–10.5)
CALCIUM SERPL-MCNC: 9.1 MG/DL — SIGNIFICANT CHANGE UP (ref 8.4–10.5)
CHLORIDE SERPL-SCNC: 102 MMOL/L — SIGNIFICANT CHANGE UP (ref 96–108)
CHLORIDE SERPL-SCNC: 103 MMOL/L — SIGNIFICANT CHANGE UP (ref 96–108)
CO2 SERPL-SCNC: 16 MMOL/L — LOW (ref 22–31)
CO2 SERPL-SCNC: 17 MMOL/L — LOW (ref 22–31)
CREAT SERPL-MCNC: 1.15 MG/DL — SIGNIFICANT CHANGE UP (ref 0.5–1.3)
CREAT SERPL-MCNC: 1.33 MG/DL — HIGH (ref 0.5–1.3)
CULTURE RESULTS: SIGNIFICANT CHANGE UP
CULTURE RESULTS: SIGNIFICANT CHANGE UP
D DIMER BLD IA.RAPID-MCNC: 544 NG/ML DDU — HIGH
EGFR: 51 ML/MIN/1.73M2 — LOW
EGFR: 61 ML/MIN/1.73M2 — SIGNIFICANT CHANGE UP
EOSINOPHIL # BLD AUTO: 0.04 K/UL — SIGNIFICANT CHANGE UP (ref 0–0.5)
EOSINOPHIL # BLD AUTO: 0.05 K/UL — SIGNIFICANT CHANGE UP (ref 0–0.5)
EOSINOPHIL NFR BLD AUTO: 0.9 % — SIGNIFICANT CHANGE UP (ref 0–6)
EOSINOPHIL NFR BLD AUTO: 0.9 % — SIGNIFICANT CHANGE UP (ref 0–6)
FERRITIN SERPL-MCNC: 377 NG/ML — HIGH (ref 15–150)
FLUAV AG NPH QL: SIGNIFICANT CHANGE UP
FLUBV AG NPH QL: SIGNIFICANT CHANGE UP
GLUCOSE SERPL-MCNC: 116 MG/DL — HIGH (ref 70–99)
GLUCOSE SERPL-MCNC: 125 MG/DL — HIGH (ref 70–99)
HCT VFR BLD CALC: 29.6 % — LOW (ref 34.5–45)
HCT VFR BLD CALC: 29.8 % — LOW (ref 34.5–45)
HGB BLD-MCNC: 8.8 G/DL — LOW (ref 11.5–15.5)
HGB BLD-MCNC: 8.9 G/DL — LOW (ref 11.5–15.5)
IRON SATN MFR SERPL: 12 % — LOW (ref 14–50)
IRON SATN MFR SERPL: 25 UG/DL — LOW (ref 30–160)
LACTATE SERPL-SCNC: 0.9 MMOL/L — SIGNIFICANT CHANGE UP (ref 0.5–2)
LYMPHOCYTES # BLD AUTO: 0.87 K/UL — LOW (ref 1–3.3)
LYMPHOCYTES # BLD AUTO: 1.19 K/UL — SIGNIFICANT CHANGE UP (ref 1–3.3)
LYMPHOCYTES # BLD AUTO: 16.8 % — SIGNIFICANT CHANGE UP (ref 13–44)
LYMPHOCYTES # BLD AUTO: 27.4 % — SIGNIFICANT CHANGE UP (ref 13–44)
MAGNESIUM SERPL-MCNC: 2 MG/DL — SIGNIFICANT CHANGE UP (ref 1.6–2.6)
MCHC RBC-ENTMCNC: 20.3 PG — LOW (ref 27–34)
MCHC RBC-ENTMCNC: 20.5 PG — LOW (ref 27–34)
MCHC RBC-ENTMCNC: 29.5 G/DL — LOW (ref 32–36)
MCHC RBC-ENTMCNC: 30.1 G/DL — LOW (ref 32–36)
MCV RBC AUTO: 68.2 FL — LOW (ref 80–100)
MCV RBC AUTO: 68.7 FL — LOW (ref 80–100)
MONOCYTES # BLD AUTO: 0.66 K/UL — SIGNIFICANT CHANGE UP (ref 0–0.9)
MONOCYTES # BLD AUTO: 1.09 K/UL — HIGH (ref 0–0.9)
MONOCYTES NFR BLD AUTO: 15.1 % — HIGH (ref 2–14)
MONOCYTES NFR BLD AUTO: 21.2 % — HIGH (ref 2–14)
NEUTROPHILS # BLD AUTO: 2.46 K/UL — SIGNIFICANT CHANGE UP (ref 1.8–7.4)
NEUTROPHILS # BLD AUTO: 3.1 K/UL — SIGNIFICANT CHANGE UP (ref 1.8–7.4)
NEUTROPHILS NFR BLD AUTO: 56.6 % — SIGNIFICANT CHANGE UP (ref 43–77)
NEUTROPHILS NFR BLD AUTO: 59.3 % — SIGNIFICANT CHANGE UP (ref 43–77)
NT-PROBNP SERPL-SCNC: 256 PG/ML — SIGNIFICANT CHANGE UP (ref 0–300)
PHOSPHATE SERPL-MCNC: 4.1 MG/DL — SIGNIFICANT CHANGE UP (ref 2.5–4.5)
PLATELET # BLD AUTO: 266 K/UL — SIGNIFICANT CHANGE UP (ref 150–400)
PLATELET # BLD AUTO: 276 K/UL — SIGNIFICANT CHANGE UP (ref 150–400)
POTASSIUM SERPL-MCNC: 3.8 MMOL/L — SIGNIFICANT CHANGE UP (ref 3.5–5.3)
POTASSIUM SERPL-MCNC: 4.2 MMOL/L — SIGNIFICANT CHANGE UP (ref 3.5–5.3)
POTASSIUM SERPL-SCNC: 3.8 MMOL/L — SIGNIFICANT CHANGE UP (ref 3.5–5.3)
POTASSIUM SERPL-SCNC: 4.2 MMOL/L — SIGNIFICANT CHANGE UP (ref 3.5–5.3)
PROT SERPL-MCNC: 8.6 G/DL — HIGH (ref 6–8.3)
PROT SERPL-MCNC: 8.6 G/DL — HIGH (ref 6–8.3)
RBC # BLD: 4.34 M/UL — SIGNIFICANT CHANGE UP (ref 3.8–5.2)
RBC # BLD: 4.34 M/UL — SIGNIFICANT CHANGE UP (ref 3.8–5.2)
RBC # FLD: 19.9 % — HIGH (ref 10.3–14.5)
RBC # FLD: 19.9 % — HIGH (ref 10.3–14.5)
RH IG SCN BLD-IMP: POSITIVE — SIGNIFICANT CHANGE UP
RSV RNA NPH QL NAA+NON-PROBE: SIGNIFICANT CHANGE UP
SARS-COV-2 RNA SPEC QL NAA+PROBE: SIGNIFICANT CHANGE UP
SODIUM SERPL-SCNC: 129 MMOL/L — LOW (ref 135–145)
SODIUM SERPL-SCNC: 131 MMOL/L — LOW (ref 135–145)
SPECIMEN SOURCE: SIGNIFICANT CHANGE UP
SPECIMEN SOURCE: SIGNIFICANT CHANGE UP
TIBC SERPL-MCNC: 206 UG/DL — LOW (ref 220–430)
TRANSFERRIN SERPL-MCNC: 165 MG/DL — LOW (ref 200–360)
TROPONIN T, HIGH SENSITIVITY RESULT: 8 NG/L — SIGNIFICANT CHANGE UP (ref 0–51)
TROPONIN T, HIGH SENSITIVITY RESULT: 9 NG/L — SIGNIFICANT CHANGE UP (ref 0–51)
UIBC SERPL-MCNC: 181 UG/DL — SIGNIFICANT CHANGE UP (ref 110–370)
WBC # BLD: 4.35 K/UL — SIGNIFICANT CHANGE UP (ref 3.8–10.5)
WBC # BLD: 5.15 K/UL — SIGNIFICANT CHANGE UP (ref 3.8–10.5)
WBC # FLD AUTO: 4.35 K/UL — SIGNIFICANT CHANGE UP (ref 3.8–10.5)
WBC # FLD AUTO: 5.15 K/UL — SIGNIFICANT CHANGE UP (ref 3.8–10.5)

## 2024-12-09 PROCEDURE — 71046 X-RAY EXAM CHEST 2 VIEWS: CPT | Mod: 26

## 2024-12-09 PROCEDURE — 99223 1ST HOSP IP/OBS HIGH 75: CPT | Mod: GC

## 2024-12-09 PROCEDURE — 71275 CT ANGIOGRAPHY CHEST: CPT | Mod: 26,MC

## 2024-12-09 RX ORDER — HYDROXYCHLOROQUINE SULFATE 200 MG/1
200 TABLET, FILM COATED ORAL EVERY 12 HOURS
Refills: 0 | Status: DISCONTINUED | OUTPATIENT
Start: 2024-12-09 | End: 2024-12-11

## 2024-12-09 RX ORDER — METOPROLOL TARTRATE 100 MG/1
25 TABLET, FILM COATED ORAL EVERY 12 HOURS
Refills: 0 | Status: DISCONTINUED | OUTPATIENT
Start: 2024-12-09 | End: 2024-12-09

## 2024-12-09 RX ORDER — TICAGRELOR 90 MG/1
60 TABLET ORAL EVERY 12 HOURS
Refills: 0 | Status: DISCONTINUED | OUTPATIENT
Start: 2024-12-09 | End: 2024-12-11

## 2024-12-09 RX ORDER — ENOXAPARIN SODIUM 30 MG/.3ML
60 INJECTION SUBCUTANEOUS ONCE
Refills: 0 | Status: DISCONTINUED | OUTPATIENT
Start: 2024-12-09 | End: 2024-12-09

## 2024-12-09 RX ORDER — ENOXAPARIN SODIUM 30 MG/.3ML
40 INJECTION SUBCUTANEOUS EVERY 12 HOURS
Refills: 0 | Status: DISCONTINUED | OUTPATIENT
Start: 2024-12-09 | End: 2024-12-11

## 2024-12-09 RX ORDER — SACUBITRIL AND VALSARTAN 24; 26 MG/1; MG/1
1 TABLET, FILM COATED ORAL
Refills: 0 | Status: DISCONTINUED | OUTPATIENT
Start: 2024-12-09 | End: 2024-12-09

## 2024-12-09 RX ORDER — ENOXAPARIN SODIUM 30 MG/.3ML
60 INJECTION SUBCUTANEOUS EVERY 24 HOURS
Refills: 0 | Status: DISCONTINUED | OUTPATIENT
Start: 2024-12-09 | End: 2024-12-09

## 2024-12-09 RX ORDER — SPIRONOLACTONE 25 MG
25 TABLET ORAL EVERY 12 HOURS
Refills: 0 | Status: DISCONTINUED | OUTPATIENT
Start: 2024-12-09 | End: 2024-12-09

## 2024-12-09 RX ORDER — ENOXAPARIN SODIUM 30 MG/.3ML
60 INJECTION SUBCUTANEOUS ONCE
Refills: 0 | Status: COMPLETED | OUTPATIENT
Start: 2024-12-09 | End: 2024-12-09

## 2024-12-09 RX ORDER — DAPAGLIFLOZIN 10 MG/1
10 TABLET, FILM COATED ORAL DAILY
Refills: 0 | Status: DISCONTINUED | OUTPATIENT
Start: 2024-12-09 | End: 2024-12-09

## 2024-12-09 RX ORDER — ENOXAPARIN SODIUM 30 MG/.3ML
40 INJECTION SUBCUTANEOUS EVERY 12 HOURS
Refills: 0 | Status: DISCONTINUED | OUTPATIENT
Start: 2024-12-09 | End: 2024-12-09

## 2024-12-09 RX ORDER — SODIUM CHLORIDE 9 MG/ML
500 INJECTION, SOLUTION INTRAMUSCULAR; INTRAVENOUS; SUBCUTANEOUS ONCE
Refills: 0 | Status: COMPLETED | OUTPATIENT
Start: 2024-12-09 | End: 2024-12-09

## 2024-12-09 RX ORDER — METOPROLOL TARTRATE 100 MG/1
1 TABLET, FILM COATED ORAL
Refills: 0 | DISCHARGE

## 2024-12-09 RX ADMIN — Medication 81 MILLIGRAM(S): at 06:03

## 2024-12-09 RX ADMIN — TICAGRELOR 60 MILLIGRAM(S): 90 TABLET ORAL at 06:40

## 2024-12-09 RX ADMIN — Medication 80 MILLIGRAM(S): at 22:24

## 2024-12-09 RX ADMIN — TICAGRELOR 60 MILLIGRAM(S): 90 TABLET ORAL at 17:48

## 2024-12-09 RX ADMIN — ENOXAPARIN SODIUM 60 MILLIGRAM(S): 30 INJECTION SUBCUTANEOUS at 05:11

## 2024-12-09 RX ADMIN — SODIUM CHLORIDE 500 MILLILITER(S): 9 INJECTION, SOLUTION INTRAMUSCULAR; INTRAVENOUS; SUBCUTANEOUS at 01:28

## 2024-12-09 RX ADMIN — ENOXAPARIN SODIUM 40 MILLIGRAM(S): 30 INJECTION SUBCUTANEOUS at 17:49

## 2024-12-09 NOTE — PROGRESS NOTE ADULT - PROBLEM SELECTOR PLAN 1
DDx: blood loss anemia 2/2 menses, though last menstrual period ended 11/20/24. JOLENE and deconditioning from recent illness possible contributors. Central PE r/o on CTPE; however peripheral PE possible given h/o SLE and elevated d-dimer, though BNP, trops, and HR wnl, satting well on RA. Of note: ED provider c/f subsegmental PE on CTPE  PNA less likely given absent fevers and nl WBC, though w/ ground glass opacities on CT. |  C/o episodic "gasping for air" for past 2d, not associated w/ time of day, environment, exertion, or position, or anxiety. Prior to this, experienced 2-4d or fever/chills and diarrhea, now resolved. No h/o COPD, asthma, allergies.     Plan:  - F/u TTE  - Holding off repeat CTPE for now  - Anemia workup as below  - PT/OT consulted

## 2024-12-09 NOTE — H&P ADULT - NSHPPHYSICALEXAM_GEN_ALL_CORE
General: Alert and oriented x 3. No acute distress.   HEENT: Moist mucous membranes. No scleral icterus. No cervical lymphadenopathy.  Cardiovascular: Regular rate and rhythm. No murmur. Normal JVP.  Lungs: Clear to auscultation bilaterally. No accessory muscle use.  Abdomen: Soft, non-tender and non-distended. No palpable masses.  Extremities: No edema. Non-tender. Warm.  Skin: No rashes or lesions.   Neurologic: No focal neurological deficits. CN II-XII grossly intact, but not individually tested.  Psychiatric: Cooperative. Appropriate mood and affect. General: Alert and oriented x 3. No acute distress.   HEENT: Moist mucous membranes. No scleral icterus. No cervical lymphadenopathy.  Cardiovascular: Regular rate and rhythm. No murmur. Normal JVP.  Lungs: Clear to auscultation bilaterally. No accessory muscle use. NO orthopnea.  Abdomen: Soft, non-tender and non-distended. No palpable masses.  Extremities: No edema. Non-tender. Warm.  Skin: No rashes or lesions.   Neurologic: No focal neurological deficits. CN II-XII grossly intact, but not individually tested.  Psychiatric: Cooperative. Appropriate mood and affect.

## 2024-12-09 NOTE — H&P ADULT - ASSESSMENT
43 y/o F w/ PMHx of HFrEF (EF 45-50% 07/2024) 2/2 NICM (s/p PCI w/ LI x1 to LAD in 02/2024), SLE (on Plaquenil), chronic DAVID 2/2 heavy menses, spinal stenosis, and GERD p/w worsening episodic SOB for past 2d, admitted for dyspnea workup.

## 2024-12-09 NOTE — H&P ADULT - HISTORY OF PRESENT ILLNESS
45 y/o F w/ PMHx of HFrEF (EF 45-50% 07/2024) 2/2 NICM (s/p PCI w/ LI x1 to LAD in 02/2024), SLE (on Plaquenil), chronic DAVID 2/2 heavy menses (s/p IUD) p/w    ED Course-  ·	VS:  98.4 F oral  | 94/61 mm Hg  |  HR 80 /min  |  RR 18 /min  |  99% room air  ·	Labs:  CBC - WBC wnl, HGB 8.9 (9.5 on 12/04/24), PLT wnl  |  CMP - Na 131, HCO3 17, BUN 32, sCr 1.33 (wnl on 12/04/24), total protein 8.6 w/ protein gap.  |  Lactate, troponin, pro-BNP wnl. RVP neg.   ·	Imaging:   (1) CTPE: No central PE. Limited visualization of peripheral pulm. vasculature. Patchy groundglass opacities in anteromedial and lateral LLL.   ·	EKG: NSR, poor r-wave progression.   ·	Consults: None  ·	Interventions:  43 y/o F w/ PMHx of HFrEF (EF 45-50% 07/2024) 2/2 NICM (s/p PCI w/ LI x1 to LAD in 02/2024), SLE (on Plaquenil), chronic DAVID 2/2 heavy menses (s/p IUD) p/w     ED Course-  ·	VS:  98.4 F oral  | 94/61 mm Hg  |  HR 80 /min  |  RR 18 /min  |  99% room air  ·	Labs:  CBC - WBC wnl, HGB 8.9 (9.5 on 12/04/24), PLT wnl  |  CMP - Na 131, HCO3 17, BUN 32, sCr 1.33 (wnl on 12/04/24), total protein 8.6 w/ protein gap.  |  Lactate, troponin, pro-BNP wnl. RVP neg.   ·	Imaging:   (1) CTPE: No central PE. Limited visualization of peripheral pulm. vasculature. Patchy groundglass opacities in anteromedial and lateral LLL.   ·	EKG: NSR, poor r-wave progression.   ·	Consults: None  ·	Interventions:  43 y/o F w/ PMHx of HFrEF (EF 45-50% 07/2024) 2/2 NICM (s/p PCI w/ LI x1 to LAD in 02/2024), SLE (on Plaquenil), chronic DAVID 2/2 heavy menses p/w     ED Course-  ·	VS:  98.4 F oral  | 94/61 mm Hg  |  HR 80 /min  |  RR 18 /min  |  99% room air  ·	Labs:  CBC - WBC wnl, HGB 8.9 (9.5 on 12/04/24), PLT wnl  |  CMP - Na 131, HCO3 17, BUN 32, sCr 1.33 (wnl on 12/04/24), total protein 8.6 w/ protein gap.  |  Lactate, troponin, pro-BNP wnl. RVP neg.   ·	Imaging:   (1) CTPE: No central PE. Limited visualization of peripheral pulm. vasculature. Patchy groundglass opacities in anteromedial and lateral LLL.   ·	EKG: NSR, poor r-wave progression.   ·	Consults: None  ·	Interventions:  45 y/o F w/ PMHx of HFrEF (EF 45-50% 07/2024) 2/2 NICM (s/p PCI w/ LI x1 to LAD in 02/2024), SLE (on Plaquenil), chronic DAVID 2/2 heavy menses, spinal stenosis, and GERD p/w worsening episodic SOB for past 2d. SOB described as "gasping for air" as if "I'm crying really hard." Episodes occur x8-10/d, including 2 episodes of PND. SOB exacerbated by speaking, otherwise no identifiable triggers. At baseline, will experience rare, mild PAREDES that pt attributes to heart failure. Also notes associated dry cough and increased frequency of longstanding b/l upper chest and back pain. Chest pain described as cramping, occurs x1-2/d, and is not associated with exertion or movement. Presenting sx not associated with time of day, exertion, position, or environment.     Prior to current sx, pt experienced fever/chills, mild abd pain, and heavy diarrhea from 12/04-12/06. Fever/chills and diarrhea now resolved.  Denies changes to exercise tolerance, environment, recent prolonged bedrest or travel, h/o asthma, COPD, or allergies.     ED Course-  ·	VS:  98.4 F oral  | 94/61 mm Hg  |  HR 80 /min  |  RR 18 /min  |  99% room air  ·	Labs:  CBC - WBC wnl, HGB 8.9 (9.5 on 12/04/24), PLT wnl  |  CMP - Na 131, HCO3 17, BUN 32, sCr 1.33 (wnl on 12/04/24), total protein 8.6 w/ protein gap.  |  Lactate, troponin, pro-BNP wnl. RVP neg.   ·	Imaging:   (1) CTPE: No central PE. Limited visualization of peripheral pulm. vasculature. Patchy groundglass opacities in anteromedial and lateral LLL.   ·	EKG: NSR, poor r-wave progression.   ·	Consults: None  ·	Interventions: Lovenox 60mg x1, 500cc NS x1 43 y/o F w/ PMHx of HFrEF (EF 45-50% 07/2024) 2/2 NICM (s/p PCI w/ LI x1 to LAD in 02/2024), SLE (on Plaquenil), chronic DAVID 2/2 heavy menses, spinal stenosis, and GERD p/w worsening episodic SOB for past 2d. SOB described as "gasping for air" as if "I'm crying really hard." Episodes occur x8-10/d, including 2 episodes of PND. SOB exacerbated by speaking, otherwise no identifiable triggers. At baseline, will experience rare, mild PAREDES that pt attributes to heart failure. Also notes associated dry cough and increased frequency of longstanding b/l upper chest and back pain. Chest pain described as cramping, occurs x1-2/d, and is not associated with exertion or movement. Presenting sx not associated with time of day, exertion, position, or environment.     Prior to current sx, pt experienced fever/chills, mild abd pain, and heavy diarrhea from 12/04-12/06. Fever/chills and diarrhea now resolved.  Denies changes to exercise tolerance, environment, anxiety/increased life stressors, recent prolonged bedrest or travel, h/o asthma, COPD, or allergies.     ED Course-  ·	VS:  98.4 F oral  | 94/61 mm Hg  |  HR 80 /min  |  RR 18 /min  |  99% room air  ·	Labs:  CBC - WBC wnl, HGB 8.9 (9.5 on 12/04/24), PLT wnl  |  CMP - Na 131, HCO3 17, BUN 32, sCr 1.33 (wnl on 12/04/24), total protein 8.6 w/ protein gap.  |  Lactate, troponin, pro-BNP wnl. RVP neg.   ·	Imaging:   (1) CTPE: No central PE. Limited visualization of peripheral pulm. vasculature. Patchy groundglass opacities in anteromedial and lateral LLL.   ·	EKG: NSR, poor r-wave progression.   ·	Consults: None  ·	Interventions: Lovenox 60mg x1, 500cc NS x1 43 y/o F w/ PMHx of HFrEF (EF 45-50% 07/2024) 2/2 NICM (s/p PCI w/ LI x1 to LAD in 02/2024), SLE (on Plaquenil), chronic DAVID 2/2 heavy menses, spinal stenosis, and GERD p/w worsening episodic SOB for past 2d. SOB described as "gasping for air" as if "I'm crying really hard." Episodes occur x8-10/d, including 2 episodes of PND. SOB exacerbated by speaking, otherwise no identifiable triggers. At baseline, will experience rare, mild PAREDES that pt attributes to heart failure. Also notes associated dry cough and increased frequency of longstanding b/l upper chest and back pain. Chest pain described as cramping, occurs x1-2/d, and is not associated with exertion or movement. Presenting sx not associated with time of day, exertion, position, or environment.     Prior to current sx, pt experienced fever/chills, mild abd pain, and heavy diarrhea from 12/04-12/06. Fever/chills and diarrhea now resolved.  Denies changes to exercise tolerance, environment, anxiety/increased life stressors, recent prolonged bedrest or travel, h/o asthma, COPD, or allergies. Last day of last menstrual period: 11/20/24.    ED Course-  ·	VS:  98.4 F oral  | 94/61 mm Hg  |  HR 80 /min  |  RR 18 /min  |  99% room air  ·	Labs:  CBC - WBC wnl, HGB 8.9 (9.5 on 12/04/24), PLT wnl  |  CMP - Na 131, HCO3 17, BUN 32, sCr 1.33 (wnl on 12/04/24), total protein 8.6 w/ protein gap.  |  Lactate, troponin, pro-BNP wnl. RVP neg.   ·	Imaging:   (1) CTPE: No central PE. Limited visualization of peripheral pulm. vasculature. Patchy groundglass opacities in anteromedial and lateral LLL.   ·	EKG: NSR, poor r-wave progression.   ·	Consults: None  ·	Interventions: Lovenox 60mg x1, 500cc NS x1

## 2024-12-09 NOTE — H&P ADULT - PROBLEM SELECTOR PLAN 4
Likely Mixed DAVID and AoCD ISO CHF and chronic heavy menses | Iron studies 12/09/24: Iron sat 12, Ferritin 377. Will hold off iron supplementation due to high ferritin and c/f iron overload    Plan:  - Closely trend HGB  - Transfuse for HGB <7.0  - Ensure 2 large-bore IVs  - Maintain active T&S

## 2024-12-09 NOTE — ED PROVIDER NOTE - CLINICAL SUMMARY MEDICAL DECISION MAKING FREE TEXT BOX
44-year-old female history of CHF SLE on Plaquenil chronic anemia secondary to heavy menstrual cycles with Mirena IUD here today with intermittent chest pain described as cramping on the left side of her chest similar to past admission in July will evaluate for CHF atypical ACS considered a differential diagnosis atypical PE PERC score 1-2 secondary to her history of lupus Mirena IUD   CBC CMP troponin proBNP D-dimer EKG chest x-ray   EKG normal sinus rhythm 80 leftward axis borderline LVH no STEMI

## 2024-12-09 NOTE — ED PROVIDER NOTE - CRITICAL CARE ATTENDING CONTRIBUTION TO CARE
Critical Care Procedure Note  Authorized and Performed by:   DO JAQUELINE Brown  Total critical care time: Approximately 36 minutes  Due to a high probability of clinically significant, life threatening deterioration, the patient required my highest level of preparedness to intervene emergently and I personally spent this critical care time directly and personally managing the patient. This critical care time included obtaining a history; examining the patient; pulse oximetry; ordering and review of studies; arranging urgent treatment with development of a management plan; evaluation of patient's response to treatment; frequent reassessment; and, discussions with other providers.  This critical care time was performed to assess and manage the high probability of imminent, life-threatening deterioration that could result in multi-organ failure. It was exclusive of separately billable procedures and treating other patients and teaching time.  Please see MDM section and the rest of the note for further information on patient assessment and treatment.

## 2024-12-09 NOTE — ED ADULT NURSE NOTE - OBJECTIVE STATEMENT
Received patient accompanied by  via stretcher BIBEMS with chief complaint of shortness of breath.   On PE, AOX4, speaking full sentences without difficulty. Patient not in active cardiac or respiratory distress, no noted neurologic deficits.  No obvious trauma/injury/deformity noted. Patient oriented to ED area. All needs attended. POC reviewed. Purposeful proactive hourly rounding in progress.

## 2024-12-09 NOTE — ED PROVIDER NOTE - OBJECTIVE STATEMENT
43 y/o obese Female, who ambulates with cane  w/ PMHx SLE (on plaquinel), HTN, anemia (baseline Hgb 10), heavy menstrual cycles (saturated 12-14 tampons per 24 hours, OBGYN recommended Mirena IUD), uterine fibroid, L ovary cyst, spinal stenosis, and hx STEMI CAD s/p C 2/2/24 @[facility] s/p PCI  LI pLAD 100% occlusion via RRA (on ASA, Brilinta), HFrEF (TTE post cath EF 30-35% severe hypokinesis) 2/2 ICM (started GDMT Entresto 24-26mg bid, Metoprolol 25mg bid, Farxiga 10mg daily and Spironolactone 25mg daily) presents to [facility] ER this evening, 7/16/24, complaining of intermittent L sided chest cramping and sob described as having to intermittently take an extra breath in as well as resolving loose stools since wed. No f/c/c or assoc abd pain. Nourinary co. No active vaginal bleeding at this time.

## 2024-12-09 NOTE — PROGRESS NOTE ADULT - SUBJECTIVE AND OBJECTIVE BOX
CC: Patient is a 44y old  Female who presents with a chief complaint of SOB (09 Dec 2024 03:58)      INTERVAL EVENTS: ULICES    SUBJECTIVE / INTERVAL HPI: Patient seen and examined at bedside.     ROS: negative unless otherwise stated above.    VITAL SIGNS:  Vital Signs Last 24 Hrs  T(C): 36.7 (09 Dec 2024 07:30), Max: 36.9 (08 Dec 2024 22:11)  T(F): 98.1 (09 Dec 2024 07:30), Max: 98.4 (08 Dec 2024 22:11)  HR: 86 (09 Dec 2024 07:30) (73 - 86)  BP: 104/65 (09 Dec 2024 07:30) (94/61 - 104/65)  BP(mean): --  RR: 18 (09 Dec 2024 07:30) (18 - 18)  SpO2: 97% (09 Dec 2024 07:30) (97% - 99%)    Parameters below as of 09 Dec 2024 07:30  Patient On (Oxygen Delivery Method): room air            PHYSICAL EXAM:    General: NAD  HEENT: MMM  Neck: supple  Cardiovascular: +S1/S2; RRR  Respiratory: CTA B/L; no W/R/R  Gastrointestinal: soft, NT/ND  Extremities: WWP; no edema, clubbing or cyanosis  Vascular: 2+ radial, DP/PT pulses B/L  Neurological: AAOx3; no focal deficits    MEDICATIONS:  MEDICATIONS  (STANDING):  aspirin enteric coated 81 milliGRAM(s) Oral every 24 hours  atorvastatin 80 milliGRAM(s) Oral at bedtime  enoxaparin Injectable 40 milliGRAM(s) SubCutaneous every 12 hours  hydroxychloroquine 200 milliGRAM(s) Oral every 12 hours  ticagrelor 60 milliGRAM(s) Oral every 12 hours    MEDICATIONS  (PRN):      ALLERGIES:  Allergies    No Known Allergies    Intolerances        LABS:                        8.8    4.35  )-----------( 266      ( 09 Dec 2024 04:22 )             29.8     12-09    129[L]  |  102  |  29[H]  ----------------------------<  116[H]  3.8   |  16[L]  |  1.15    Ca    8.9      09 Dec 2024 04:22  Phos  4.1     12-09  Mg     2.0     12-09    TPro  8.6[H]  /  Alb  3.9  /  TBili  0.4  /  DBili  x   /  AST  34  /  ALT  29  /  AlkPhos  60  12-09      Urinalysis Basic - ( 09 Dec 2024 04:22 )    Color: x / Appearance: x / SG: x / pH: x  Gluc: 116 mg/dL / Ketone: x  / Bili: x / Urobili: x   Blood: x / Protein: x / Nitrite: x   Leuk Esterase: x / RBC: x / WBC x   Sq Epi: x / Non Sq Epi: x / Bacteria: x      CAPILLARY BLOOD GLUCOSE          RADIOLOGY & ADDITIONAL TESTS: Reviewed. CC: Patient is a 44y old  Female who presents with a chief complaint of SOB (09 Dec 2024 03:58)      INTERVAL EVENTS: ULICSE    SUBJECTIVE / INTERVAL HPI: Patient seen and examined at bedside. Patient reports continued episodic shortness of breath and describes it as "gasping." She continues to report intermittent chest pain 3/10 in severity today, varying between aching and sharp. She states she has had chest pain before and states this does not feel like "heart attack pain." She also reports experiencing palpitations yesterday and today. She reports when she had the STEMI she was experiencing chest tightness and indigestion, which she denies currently.   Denies any fever/chills, abdominal pain, diarrhea, history of asthma/COPD, or changes to medications, diet, or increased stress.   ROS: negative unless otherwise stated above.    VITAL SIGNS:  Vital Signs Last 24 Hrs  T(C): 36.7 (09 Dec 2024 07:30), Max: 36.9 (08 Dec 2024 22:11)  T(F): 98.1 (09 Dec 2024 07:30), Max: 98.4 (08 Dec 2024 22:11)  HR: 86 (09 Dec 2024 07:30) (73 - 86)  BP: 104/65 (09 Dec 2024 07:30) (94/61 - 104/65)  BP(mean): --  RR: 18 (09 Dec 2024 07:30) (18 - 18)  SpO2: 97% (09 Dec 2024 07:30) (97% - 99%)    Parameters below as of 09 Dec 2024 07:30  Patient On (Oxygen Delivery Method): room air            PHYSICAL EXAM:    General: NAD  HEENT: MMM  Neck: supple  Cardiovascular: +S1/S2; RRR  Respiratory: CTA B/L; no W/R/R. No accessory muscle use or apparent respiratory distress. No orthopnea.   Gastrointestinal: soft, NT/ND  Extremities: WWP; no edema, clubbing or cyanosis  Vascular: 2+ radial, DP/PT pulses B/L  Neurological: AAOx3; no focal deficits    MEDICATIONS:  MEDICATIONS  (STANDING):  aspirin enteric coated 81 milliGRAM(s) Oral every 24 hours  atorvastatin 80 milliGRAM(s) Oral at bedtime  enoxaparin Injectable 40 milliGRAM(s) SubCutaneous every 12 hours  hydroxychloroquine 200 milliGRAM(s) Oral every 12 hours  ticagrelor 60 milliGRAM(s) Oral every 12 hours    MEDICATIONS  (PRN):      ALLERGIES:  Allergies    No Known Allergies    Intolerances        LABS:                        8.8    4.35  )-----------( 266      ( 09 Dec 2024 04:22 )             29.8     12-09    129[L]  |  102  |  29[H]  ----------------------------<  116[H]  3.8   |  16[L]  |  1.15    Ca    8.9      09 Dec 2024 04:22  Phos  4.1     12-09  Mg     2.0     12-09    TPro  8.6[H]  /  Alb  3.9  /  TBili  0.4  /  DBili  x   /  AST  34  /  ALT  29  /  AlkPhos  60  12-09      Urinalysis Basic - ( 09 Dec 2024 04:22 )    Color: x / Appearance: x / SG: x / pH: x  Gluc: 116 mg/dL / Ketone: x  / Bili: x / Urobili: x   Blood: x / Protein: x / Nitrite: x   Leuk Esterase: x / RBC: x / WBC x   Sq Epi: x / Non Sq Epi: x / Bacteria: x      CAPILLARY BLOOD GLUCOSE          RADIOLOGY & ADDITIONAL TESTS: Reviewed.

## 2024-12-09 NOTE — H&P ADULT - NSHPSOCIALHISTORY_GEN_ALL_CORE
Living Situation: Lives in apartment w/ children  Recent Travel: No recent travel  Occupation: Does not work  Mobility: Uses cane to ambulate  Alcohol Use: None  Tobacco Use: None  Cannabis Use: None  Substance Use:; None

## 2024-12-09 NOTE — H&P ADULT - NSHPLABSRESULTS_GEN_ALL_CORE
.  LABS:                         8.8    4.35  )-----------( 266      ( 09 Dec 2024 04:22 )             29.8     12-09    129[L]  |  102  |  29[H]  ----------------------------<  116[H]  3.8   |  16[L]  |  1.15    Ca    8.9      09 Dec 2024 04:22  Phos  4.1     12-09  Mg     2.0     12-09    TPro  8.6[H]  /  Alb  3.9  /  TBili  0.4  /  DBili  x   /  AST  34  /  ALT  29  /  AlkPhos  60  12-09      Urinalysis Basic - ( 09 Dec 2024 04:22 )    Color: x / Appearance: x / SG: x / pH: x  Gluc: 116 mg/dL / Ketone: x  / Bili: x / Urobili: x   Blood: x / Protein: x / Nitrite: x   Leuk Esterase: x / RBC: x / WBC x   Sq Epi: x / Non Sq Epi: x / Bacteria: x            Lactate, Blood: 0.9 mmol/L (12-09 @ 02:40)      RADIOLOGY, EKG & ADDITIONAL TESTS: Reviewed.

## 2024-12-09 NOTE — H&P ADULT - PROBLEM SELECTOR PLAN 5
F: ---  E: Maintain K >4, Mg >2  N: DASH  VTE ppx: Lovenox  Dispo: [facility]  ---  CODE: FULL F: ---  E: Maintain K >4, Mg >2  N: DASH  VTE ppx: Lovenox  Dispo: [facility]

## 2024-12-09 NOTE — H&P ADULT - PROBLEM SELECTOR PLAN 1
2/2 ICM (s/p PCI w/ LI x1 to pLAD in 02/2024)       - 07/2024 TTE: LVEF 45-50%, apical hypokinesis, RV function intact, atria nl, no valvular dz. Likely 2/2 blood loss anemia 2/2 menses. JOLENE and deconditioning from recent illness possible contributors. Central PE r/o on CTPE; peripheral PE possible given h/o SLE and elevated d-dimer, though BNP, trops, and HR wnl, satting well on RA. | C/o episodic "gasping for air" for past 2d, not associated w/ time of day, environment, exertion, or position. Prior to this, experienced 2-4d or fever/chills and diarrhea, now resolved. No h/o COPD, asthma, allergies.     Plan:  - Likely 2/2 blood loss anemia 2/2 menses. JOLENE and deconditioning from recent illness possible contributors. Central PE r/o on CTPE; peripheral PE possible given h/o SLE and elevated d-dimer, though BNP, trops, and HR wnl, satting well on RA. | C/o episodic "gasping for air" for past 2d, not associated w/ time of day, environment, exertion, or position, or anxiety. Prior to this, experienced 2-4d or fever/chills and diarrhea, now resolved. No h/o COPD, asthma, allergies.     Plan:  - Anemia workup as below  - PT/OT consulted Likely 2/2 blood loss anemia 2/2 menses. JOLENE and deconditioning from recent illness possible contributors. Central PE r/o on CTPE; peripheral PE possible given h/o SLE and elevated d-dimer, though BNP, trops, and HR wnl, satting well on RA. | C/o episodic "gasping for air" for past 2d, not associated w/ time of day, environment, exertion, or position, or anxiety. Prior to this, experienced 2-4d or fever/chills and diarrhea, now resolved. No h/o COPD, asthma, allergies.     Plan:  - F/u TTE  - Anemia workup as below  - PT/OT consulted Likely 2/2 blood loss anemia 2/2 menses. JOLENE and deconditioning from recent illness possible contributors. Central PE r/o on CTPE; peripheral PE possible given h/o SLE and elevated d-dimer, though BNP, trops, and HR wnl, satting well on RA. Of note: ED provider c/f subsegmental PE on CTPE  |  C/o episodic "gasping for air" for past 2d, not associated w/ time of day, environment, exertion, or position, or anxiety. Prior to this, experienced 2-4d or fever/chills and diarrhea, now resolved. No h/o COPD, asthma, allergies.     Plan:  - F/u TTE  - Holding off repeat CTPE for now  - Anemia workup as below  - PT/OT consulted DDx: blood loss anemia 2/2 menses, though last menstrual period ended 11/20/24. JOLENE and deconditioning from recent illness possible contributors. Central PE r/o on CTPE; however peripheral PE possible given h/o SLE and elevated d-dimer, though BNP, trops, and HR wnl, satting well on RA. Of note: ED provider c/f subsegmental PE on CTPE  |  C/o episodic "gasping for air" for past 2d, not associated w/ time of day, environment, exertion, or position, or anxiety. Prior to this, experienced 2-4d or fever/chills and diarrhea, now resolved. No h/o COPD, asthma, allergies.     Plan:  - F/u TTE  - Holding off repeat CTPE for now  - Anemia workup as below  - PT/OT consulted DDx: blood loss anemia 2/2 menses, though last menstrual period ended 11/20/24. JOLENE and deconditioning from recent illness possible contributors. Central PE r/o on CTPE; however peripheral PE possible given h/o SLE and elevated d-dimer, though BNP, trops, and HR wnl, satting well on RA. Of note: ED provider c/f subsegmental PE on CTPE  PNA less likely given absent fevers and nl WBC, though w/ ground glass opacities on CT. |  C/o episodic "gasping for air" for past 2d, not associated w/ time of day, environment, exertion, or position, or anxiety. Prior to this, experienced 2-4d or fever/chills and diarrhea, now resolved. No h/o COPD, asthma, allergies.     Plan:  - F/u TTE  - Holding off repeat CTPE for now  - Anemia workup as below  - PT/OT consulted

## 2024-12-09 NOTE — H&P ADULT - PROBLEM SELECTOR PLAN 2
2/2 ICM (s/p PCI w/ LI x1 to pLAD in 02/2024). Holding home GDMT for hypotension       - 07/2024 TTE: LVEF 45-50%, apical hypokinesis, RV function intact, atria nl, no valvular dz.    Plan:  - C/w home ASA qd  - C/w home Brilinta 60 BID  - C/w home atorvastatin 80 qHS  - Holding home spironolactone 25 BID  - Holding home Entresto 24-26 BID  - Holding home Lopressor 25 BID  - Holding home Farxiga 10mg qd

## 2024-12-09 NOTE — PATIENT PROFILE ADULT - FALL HARM RISK - HARM RISK INTERVENTIONS
Assistance with ambulation/Assistance OOB with selected safe patient handling equipment/Communicate Risk of Fall with Harm to all staff/Discuss with provider need for PT consult/Monitor gait and stability/Provide patient with walking aids - walker, cane, crutches/Reinforce activity limits and safety measures with patient and family/Review medications for side effects contributing to fall risk/Sit up slowly, dangle for a short time, stand at bedside before walking/Tailored Fall Risk Interventions/Toileting schedule using arm’s reach rule for commode and bathroom/Use of alarms - bed, chair and/or voice tab/Visual Cue: Yellow wristband and red socks/Bed in lowest position, wheels locked, appropriate side rails in place/Call bell, personal items and telephone in reach/Instruct patient to call for assistance before getting out of bed or chair/Non-slip footwear when patient is out of bed/[place] to call system/Physically safe environment - no spills, clutter or unnecessary equipment/Purposeful Proactive Rounding/Room/bathroom lighting operational, light cord in reach

## 2024-12-09 NOTE — H&P ADULT - ATTENDING COMMENTS
Pt. seen and examined by me earlier today; I have read Dr. Orellana's H&P, I agree w/ her findings and plan of care as documented; unclear etiology of Pt..'s sx; VSS on room air; sx prior to admission sound c/w viral syndrome, but RVP negative; LMP 11/29, Hb stable, doubt symptomatic anemia; housestaff reviewed CT findings w/ radiologist, infarct felt to be unlikely, findings more c/w infectious or inflammatory etiology, will hold off on repeat CTA imaging; serial troponin negative, doubt ACS but await call-back from outpatient cardiologist given CAD hx; holding GDMT for now, can reintroduce as BP allows; check ESR/CRP; f/u TTE

## 2024-12-09 NOTE — ED PROVIDER NOTE - PROGRESS NOTE DETAILS
Initial troponin proBNP negative hemoglobin slightly below baseline appears comfortable will repeat blood pressure as it was soft on arrival as well as repeat troponin and dimer Patient stable CTA showing questionable wedge-shaped infarct with subsegmental PE?  Called radiology for review, will review with attending timing is not optimal we will hold anticoagulation pending final radiology result Hemoglobin roughly at baseline patient without vaginal bleeding CTA has poor timing although on my review of CTA it appears the patient does have a subsegmental PE with a wedge-shaped infarct, will cover with Lovenox lactate is negative no RV strain not hypoxic patient well-appearing will admit for repeat study will require repeat labs and monitoring repeat study within 12 to 24 hours for optimal timing of bolus Hemoglobin roughly at baseline patient without vaginal bleeding CTA has poor timing although on my review of CTA it appears the patient does have a subsegmental PE with a wedge-shaped infarct, will cover with Lovenox lactate is negativept not septic,  no RV strain not hypoxic, unclear if this is atypical pna vs wedge shaped infarct, patient well-appearing will admit for repeat study will require repeat labs and monitoring repeat study within 12 to 24 hours for optimal timing of bolus, consider abx

## 2024-12-10 ENCOUNTER — RESULT REVIEW (OUTPATIENT)
Age: 44
End: 2024-12-10

## 2024-12-10 LAB
ADD ON TEST-SPECIMEN IN LAB: SIGNIFICANT CHANGE UP
ANION GAP SERPL CALC-SCNC: 10 MMOL/L — SIGNIFICANT CHANGE UP (ref 5–17)
BUN SERPL-MCNC: 18 MG/DL — SIGNIFICANT CHANGE UP (ref 7–23)
CALCIUM SERPL-MCNC: 9.1 MG/DL — SIGNIFICANT CHANGE UP (ref 8.4–10.5)
CHLORIDE SERPL-SCNC: 106 MMOL/L — SIGNIFICANT CHANGE UP (ref 96–108)
CO2 SERPL-SCNC: 16 MMOL/L — LOW (ref 22–31)
CREAT ?TM UR-MCNC: 94 MG/DL — SIGNIFICANT CHANGE UP
CREAT SERPL-MCNC: 0.87 MG/DL — SIGNIFICANT CHANGE UP (ref 0.5–1.3)
EGFR: 85 ML/MIN/1.73M2 — SIGNIFICANT CHANGE UP
GLUCOSE SERPL-MCNC: 139 MG/DL — HIGH (ref 70–99)
HCT VFR BLD CALC: 28.8 % — LOW (ref 34.5–45)
HGB BLD-MCNC: 8.7 G/DL — LOW (ref 11.5–15.5)
MAGNESIUM SERPL-MCNC: 1.8 MG/DL — SIGNIFICANT CHANGE UP (ref 1.6–2.6)
MCHC RBC-ENTMCNC: 21.3 PG — LOW (ref 27–34)
MCHC RBC-ENTMCNC: 30.2 G/DL — LOW (ref 32–36)
MCV RBC AUTO: 70.4 FL — LOW (ref 80–100)
NRBC # BLD: 0 /100 WBCS — SIGNIFICANT CHANGE UP (ref 0–0)
OSMOLALITY UR: 390 MOSM/KG — SIGNIFICANT CHANGE UP (ref 300–900)
PHOSPHATE SERPL-MCNC: 3.5 MG/DL — SIGNIFICANT CHANGE UP (ref 2.5–4.5)
PLATELET # BLD AUTO: 336 K/UL — SIGNIFICANT CHANGE UP (ref 150–400)
POTASSIUM SERPL-MCNC: 3.8 MMOL/L — SIGNIFICANT CHANGE UP (ref 3.5–5.3)
POTASSIUM SERPL-SCNC: 3.8 MMOL/L — SIGNIFICANT CHANGE UP (ref 3.5–5.3)
POTASSIUM UR-SCNC: 13 MMOL/L — SIGNIFICANT CHANGE UP
PROT ?TM UR-MCNC: 28 MG/DL — HIGH (ref 0–12)
PROT/CREAT UR-RTO: 0.3 RATIO — HIGH (ref 0–0.2)
RBC # BLD: 4.09 M/UL — SIGNIFICANT CHANGE UP (ref 3.8–5.2)
RBC # FLD: 19.9 % — HIGH (ref 10.3–14.5)
SODIUM SERPL-SCNC: 132 MMOL/L — LOW (ref 135–145)
SODIUM UR-SCNC: 32 MMOL/L — SIGNIFICANT CHANGE UP
UUN UR-MCNC: 585 MG/DL — SIGNIFICANT CHANGE UP
WBC # BLD: 3.5 K/UL — LOW (ref 3.8–10.5)
WBC # FLD AUTO: 3.5 K/UL — LOW (ref 3.8–10.5)

## 2024-12-10 PROCEDURE — 93306 TTE W/DOPPLER COMPLETE: CPT | Mod: 26

## 2024-12-10 PROCEDURE — 99233 SBSQ HOSP IP/OBS HIGH 50: CPT | Mod: GC

## 2024-12-10 PROCEDURE — 99223 1ST HOSP IP/OBS HIGH 75: CPT | Mod: GC

## 2024-12-10 RX ORDER — CEFTRIAXONE SODIUM 1 G
2000 VIAL (EA) INJECTION EVERY 24 HOURS
Refills: 0 | Status: DISCONTINUED | OUTPATIENT
Start: 2024-12-10 | End: 2024-12-11

## 2024-12-10 RX ORDER — SPIRONOLACTONE 25 MG
25 TABLET ORAL EVERY 24 HOURS
Refills: 0 | Status: DISCONTINUED | OUTPATIENT
Start: 2024-12-10 | End: 2024-12-11

## 2024-12-10 RX ORDER — METOPROLOL TARTRATE 100 MG/1
25 TABLET, FILM COATED ORAL
Refills: 0 | Status: DISCONTINUED | OUTPATIENT
Start: 2024-12-10 | End: 2024-12-11

## 2024-12-10 RX ORDER — SPIRONOLACTONE 25 MG
25 TABLET ORAL
Refills: 0 | Status: DISCONTINUED | OUTPATIENT
Start: 2024-12-10 | End: 2024-12-10

## 2024-12-10 RX ORDER — AZITHROMYCIN 250 MG/1
500 TABLET, FILM COATED ORAL EVERY 24 HOURS
Refills: 0 | Status: DISCONTINUED | OUTPATIENT
Start: 2024-12-10 | End: 2024-12-11

## 2024-12-10 RX ORDER — POTASSIUM CHLORIDE 600 MG/1
40 TABLET, EXTENDED RELEASE ORAL ONCE
Refills: 0 | Status: COMPLETED | OUTPATIENT
Start: 2024-12-10 | End: 2024-12-10

## 2024-12-10 RX ORDER — SACUBITRIL AND VALSARTAN 24; 26 MG/1; MG/1
1 TABLET, FILM COATED ORAL
Refills: 0 | Status: DISCONTINUED | OUTPATIENT
Start: 2024-12-10 | End: 2024-12-11

## 2024-12-10 RX ADMIN — ENOXAPARIN SODIUM 40 MILLIGRAM(S): 30 INJECTION SUBCUTANEOUS at 06:29

## 2024-12-10 RX ADMIN — SACUBITRIL AND VALSARTAN 1 TABLET(S): 24; 26 TABLET, FILM COATED ORAL at 09:59

## 2024-12-10 RX ADMIN — Medication 100 MILLIGRAM(S): at 23:09

## 2024-12-10 RX ADMIN — Medication 25 MILLIGRAM(S): at 14:13

## 2024-12-10 RX ADMIN — POTASSIUM CHLORIDE 40 MILLIEQUIVALENT(S): 600 TABLET, EXTENDED RELEASE ORAL at 09:59

## 2024-12-10 RX ADMIN — TICAGRELOR 60 MILLIGRAM(S): 90 TABLET ORAL at 06:29

## 2024-12-10 RX ADMIN — ENOXAPARIN SODIUM 40 MILLIGRAM(S): 30 INJECTION SUBCUTANEOUS at 17:04

## 2024-12-10 RX ADMIN — SACUBITRIL AND VALSARTAN 1 TABLET(S): 24; 26 TABLET, FILM COATED ORAL at 17:03

## 2024-12-10 RX ADMIN — AZITHROMYCIN 500 MILLIGRAM(S): 250 TABLET, FILM COATED ORAL at 23:08

## 2024-12-10 RX ADMIN — Medication 100 GRAM(S): at 10:00

## 2024-12-10 RX ADMIN — Medication 81 MILLIGRAM(S): at 06:29

## 2024-12-10 RX ADMIN — TICAGRELOR 60 MILLIGRAM(S): 90 TABLET ORAL at 17:04

## 2024-12-10 RX ADMIN — Medication 80 MILLIGRAM(S): at 22:47

## 2024-12-10 NOTE — PROGRESS NOTE ADULT - SUBJECTIVE AND OBJECTIVE BOX
**INCOMPLETE NOTE    INTERVAL/OVERNIGHT EVENTS: None    SUBJECTIVE:  Patient seen and examined at bedside, comfortable, NAD. Denied fever, chest pain, dyspnea, abdominal pain.     Vital Signs Last 12 Hrs  T(F): 97.4 (12-10-24 @ 06:57), Max: 98.3 (12-09-24 @ 21:48)  HR: 84 (12-10-24 @ 06:57) (84 - 97)  BP: 117/76 (12-10-24 @ 06:57) (116/76 - 128/82)  BP(mean): --  RR: 18 (12-10-24 @ 06:57) (18 - 18)  SpO2: 100% (12-10-24 @ 06:57) (98% - 100%)  I&O's Summary      PHYSICAL EXAM:  General: NAD  HEENT: PERRL, EOM intact, sclera anicteric, MMM  Cardiovascular: RRR; no MRG; no JVD  Respiratory: CTAB; no WRR  GI/: soft; NTND; BS x4  Extremities: WWP; 2+ peripheral pulses bilaterally; no LE edema  Skin: normal color & turgor; no rash  Neurologic: aox3; no focal deficits    LABS:                        8.8    4.35  )-----------( 266      ( 09 Dec 2024 04:22 )             29.8     12-09    129[L]  |  102  |  29[H]  ----------------------------<  116[H]  3.8   |  16[L]  |  1.15    Ca    8.9      09 Dec 2024 04:22  Phos  4.1     12-09  Mg     2.0     12-09    TPro  8.6[H]  /  Alb  3.9  /  TBili  0.4  /  DBili  x   /  AST  34  /  ALT  29  /  AlkPhos  60  12-09      Urinalysis Basic - ( 09 Dec 2024 04:22 )    Color: x / Appearance: x / SG: x / pH: x  Gluc: 116 mg/dL / Ketone: x  / Bili: x / Urobili: x   Blood: x / Protein: x / Nitrite: x   Leuk Esterase: x / RBC: x / WBC x   Sq Epi: x / Non Sq Epi: x / Bacteria: x          RADIOLOGY & ADDITIONAL TESTS:    MEDICATIONS  (STANDING):  aspirin enteric coated 81 milliGRAM(s) Oral every 24 hours  atorvastatin 80 milliGRAM(s) Oral at bedtime  enoxaparin Injectable 40 milliGRAM(s) SubCutaneous every 12 hours  hydroxychloroquine 200 milliGRAM(s) Oral every 12 hours  ticagrelor 60 milliGRAM(s) Oral every 12 hours    MEDICATIONS  (PRN):   Hospital Course     INTERVAL/OVERNIGHT EVENTS: None    SUBJECTIVE:  Patient seen and examined at bedside, comfortable, NAD. Denied fever, chest pain, dyspnea, abdominal pain.     Vital Signs Last 12 Hrs  T(F): 97.4 (12-10-24 @ 06:57), Max: 98.3 (12-09-24 @ 21:48)  HR: 84 (12-10-24 @ 06:57) (84 - 97)  BP: 117/76 (12-10-24 @ 06:57) (116/76 - 128/82)  BP(mean): --  RR: 18 (12-10-24 @ 06:57) (18 - 18)  SpO2: 100% (12-10-24 @ 06:57) (98% - 100%)  I&O's Summary      PHYSICAL EXAM:  General: NAD  HEENT: PERRL, EOM intact, sclera anicteric, MMM  Cardiovascular: RRR; no MRG; no JVD  Respiratory: CTAB; no WRR  GI/: soft; NTND; BS x4  Extremities: WWP; 2+ peripheral pulses bilaterally; no LE edema  Skin: normal color & turgor; no rash  Neurologic: aox3; no focal deficits    LABS:                        8.8    4.35  )-----------( 266      ( 09 Dec 2024 04:22 )             29.8     12-09    129[L]  |  102  |  29[H]  ----------------------------<  116[H]  3.8   |  16[L]  |  1.15    Ca    8.9      09 Dec 2024 04:22  Phos  4.1     12-09  Mg     2.0     12-09    TPro  8.6[H]  /  Alb  3.9  /  TBili  0.4  /  DBili  x   /  AST  34  /  ALT  29  /  AlkPhos  60  12-09      Urinalysis Basic - ( 09 Dec 2024 04:22 )    Color: x / Appearance: x / SG: x / pH: x  Gluc: 116 mg/dL / Ketone: x  / Bili: x / Urobili: x   Blood: x / Protein: x / Nitrite: x   Leuk Esterase: x / RBC: x / WBC x   Sq Epi: x / Non Sq Epi: x / Bacteria: x          RADIOLOGY & ADDITIONAL TESTS:    MEDICATIONS  (STANDING):  aspirin enteric coated 81 milliGRAM(s) Oral every 24 hours  atorvastatin 80 milliGRAM(s) Oral at bedtime  enoxaparin Injectable 40 milliGRAM(s) SubCutaneous every 12 hours  hydroxychloroquine 200 milliGRAM(s) Oral every 12 hours  ticagrelor 60 milliGRAM(s) Oral every 12 hours    MEDICATIONS  (PRN):   Hospital Course:   Patient is a 44F w/ PMHx of HFrEF (EF 45-50% 07/2024) 2/2 NICM (s/p PCI w/ LI x1 to LAD in 02/2024), SLE (on Plaquenil), chronic DAVID 2/2 heavy menses, spinal stenosis, and GERD and no prior history of asthma/COPD presents with two day history of worsening episodic dyspnea on exertion>at rest following probably viral illness associated with subjective fevers, chills and diarrhea. Given elevated d-dimer CTPE performed showing No central pulmonary embolism, patchy GGO in left lower lobe. . TTE ordered to assess for possible pulmonary HTN. Cannot exclude vasculitis given autoimmune condition although no cutaneous, renal or neurological manifestations .TTE pending read, pulm consulted c/f possible vasculitis and further work up. See working ddx below.  Restarted GDMT - Entresto BID and spironolactone 25md qd, with plan to restart lopressor as patient tolerates.     INTERVAL/OVERNIGHT EVENTS: Patient endorsing chest pain and anxiety surrrounding held GDMT. EKG without signs of ACS.     SUBJECTIVE:  Patient seen and examined at bedside, comfortable, NAD. Continues to endorse dyspnea, describing it as "10 steps feeling like 300" denies chest pain or palpitations.     Vital Signs Last 12 Hrs  T(F): 97.4 (12-10-24 @ 06:57), Max: 98.3 (12-09-24 @ 21:48)  HR: 84 (12-10-24 @ 06:57) (84 - 97)  BP: 117/76 (12-10-24 @ 06:57) (116/76 - 128/82)  BP(mean): --  RR: 18 (12-10-24 @ 06:57) (18 - 18)  SpO2: 100% (12-10-24 @ 06:57) (98% - 100%)  I&O's Summary      PHYSICAL EXAM:  General: NAD, voice is hoarse   HEENT: PERRL, EOM intact, sclera anicteric, MMM  Cardiovascular: RRR; no MRG; no JVD  Respiratory: CTAB; no WRR  GI/: soft; NTND; BS x4  Extremities: WWP; 2+ peripheral pulses bilaterally; no LE edema  Skin: normal color & turgor; no rash  Neurologic: aox3; no focal deficits    LABS:                        8.8    4.35  )-----------( 266      ( 09 Dec 2024 04:22 )             29.8     12-09    129[L]  |  102  |  29[H]  ----------------------------<  116[H]  3.8   |  16[L]  |  1.15    Ca    8.9      09 Dec 2024 04:22  Phos  4.1     12-09  Mg     2.0     12-09    TPro  8.6[H]  /  Alb  3.9  /  TBili  0.4  /  DBili  x   /  AST  34  /  ALT  29  /  AlkPhos  60  12-09      Urinalysis Basic - ( 09 Dec 2024 04:22 )    Color: x / Appearance: x / SG: x / pH: x  Gluc: 116 mg/dL / Ketone: x  / Bili: x / Urobili: x   Blood: x / Protein: x / Nitrite: x   Leuk Esterase: x / RBC: x / WBC x   Sq Epi: x / Non Sq Epi: x / Bacteria: x          RADIOLOGY & ADDITIONAL TESTS:    MEDICATIONS  (STANDING):  aspirin enteric coated 81 milliGRAM(s) Oral every 24 hours  atorvastatin 80 milliGRAM(s) Oral at bedtime  enoxaparin Injectable 40 milliGRAM(s) SubCutaneous every 12 hours  hydroxychloroquine 200 milliGRAM(s) Oral every 12 hours  ticagrelor 60 milliGRAM(s) Oral every 12 hours    MEDICATIONS  (PRN):

## 2024-12-10 NOTE — CONSULT NOTE ADULT - SUBJECTIVE AND OBJECTIVE BOX
PULMONARY SERVICE INITIAL CONSULT NOTE    HPI:  45 y/o F w/ PMHx of HFrEF (EF 45-50% 07/2024), CAD (s/p PCI w/ LI x1 to LAD in 02/2024), SLE (on Plaquenil), chronic DAVID 2/2 heavy menses, spinal stenosis, and GERD who presented w/ SOB and cough dry, predated by fevers/chills/URTI sore throat, runny nose. She also had diarrhea that was severe ongoing. Her tmax at home was 103. She was afebrile in the ED, 99% on room air. Labs significant for NAGMA w/ bicarb 17. otherwise RSV flu covid negative. CTPE was done, no central PE seen. there was a patchy area of dense GGO in the left lower lobe seen as well and CXR w/ left basilar consolidation. She was admitted to medicine for care.     Pulmonary is consulted for abnormal CT scan findings and SOB. She reports fever/chills/URTI symptoms and diarrhea right before her SOB onset. She denies smoking. SHe does use intermittent Marijuana otherwise denies pulmonary history of pulmonary infections or asthma/copd. She reports lupus is well controlled for years on plaquenil. denies lung related complications of lupus. Does not have a pulmonary physician outpatient she has not taken antibiotics for this acute episode.     REVIEW OF SYSTEMS:  10 point ROS negative aside from what is mentioned in HPI    PAST MEDICAL & SURGICAL HISTORY:  SLE (systemic lupus erythematosus)  HTN (hypertension)  Spinal stenosis  Palpitation  Menorrhagia  Anemia  CAD (coronary artery disease  History of ST elevation myocardial infarction (STEMI)  Hyperlipidemia  Chronic systolic congestive heart failure  H/O cardiac catheterization          FAMILY HISTORY:      SOCIAL HISTORY:  Smoking Status: [ ] Current, [ ] Former, [x ] Never  Pack Years:    MEDICATIONS:  Pulmonary:    Antimicrobials:  hydroxychloroquine 200 milliGRAM(s) Oral every 12 hours    Anticoagulants:  aspirin enteric coated 81 milliGRAM(s) Oral every 24 hours  enoxaparin Injectable 40 milliGRAM(s) SubCutaneous every 12 hours  ticagrelor 60 milliGRAM(s) Oral every 12 hours    Onc:    GI/:    Endocrine:  atorvastatin 80 milliGRAM(s) Oral at bedtime    Cardiac:  metoprolol tartrate 25 milliGRAM(s) Oral two times a day  sacubitril 24 mG/valsartan 26 mG 1 Tablet(s) Oral two times a day  spironolactone 25 milliGRAM(s) Oral every 24 hours    Other Medications:      Allergies    No Known Allergies    Intolerances        Vital Signs Last 24 Hrs  T(C): 37.1 (10 Dec 2024 14:12), Max: 37.1 (10 Dec 2024 14:12)  T(F): 98.7 (10 Dec 2024 14:12), Max: 98.7 (10 Dec 2024 14:12)  HR: 79 (10 Dec 2024 17:00) (79 - 97)  BP: 94/63 (10 Dec 2024 17:00) (94/63 - 128/82)  BP(mean): --  RR: 18 (10 Dec 2024 14:12) (18 - 18)  SpO2: 99% (10 Dec 2024 14:12) (98% - 100%)    Parameters below as of 10 Dec 2024 14:12  Patient On (Oxygen Delivery Method): room air              PHYSICAL EXAM:  Constitutional: well-appearing, in no apparent distress  Head: NC/AT  EENT: PERRL, anicteric sclera; oropharynx clear with moist mucous membranes  Neck: supple, ROM intact, no appreciable JVD or LAD  Respiratory: Lungs clear to auscultation bilaterally, no wheezes, rhonchi or rales  Cardiovascular: +S1/S2 intact, regular rhythm and rate. No murmurs appreciated  Gastrointestinal: soft, non-tender, non distended, bowel sounds normoactive   Extremities: no edema, clubbing or cyanosis  Vascular: 2+ radial pulses B/L  Neurological: awake and alert; oriented with no appreciable focal neuro defecits    LABS:      CBC Full  -  ( 10 Dec 2024 05:30 )  WBC Count : 3.50 K/uL  RBC Count : 4.09 M/uL  Hemoglobin : 8.7 g/dL  Hematocrit : 28.8 %  Platelet Count - Automated : 336 K/uL  Mean Cell Volume : 70.4 fl  Mean Cell Hemoglobin : 21.3 pg  Mean Cell Hemoglobin Concentration : 30.2 g/dL  Auto Neutrophil # : x  Auto Lymphocyte # : x  Auto Monocyte # : x  Auto Eosinophil # : x  Auto Basophil # : x  Auto Neutrophil % : x  Auto Lymphocyte % : x  Auto Monocyte % : x  Auto Eosinophil % : x  Auto Basophil % : x    12-10    132[L]  |  106  |  18  ----------------------------<  139[H]  3.8   |  16[L]  |  0.87    Ca    9.1      10 Dec 2024 05:30  Phos  3.5     12-10  Mg     1.8     12-10    TPro  8.6[H]  /  Alb  3.9  /  TBili  0.4  /  DBili  x   /  AST  34  /  ALT  29  /  AlkPhos  60  12-09                      RADIOLOGY & ADDITIONAL STUDIES:    reviewed    < from: CT Angio Chest PE Protocol w/ IV Cont (12.09.24 @ 02:36) >  IMPRESSION:  1.  No central pulmonary embolism. Respiratory motion and suboptimal   contrast bolus timing limits evaluation of the pulmonary vasculature.   Evaluation of the segmental and subsegmental pulmonary arteries is   nondiagnostic.  2.  Patchy groundglass opacity spanning the anteromedial and lateral   segments of the left lower lobe. Differential includes infection,   inflammation, and infarction.    < end of copied text >  < from: Xray Chest 2 Views PA/Lat (12.09.24 @ 00:29) >    Findings/  impression: Left lower lobe consolidation. Right basilar focal   atelectasis. Heart and mediastinum are unremarkable. Stable bony   structures    < end of copied text >  < from: TTE Echo Complete w/ Contrast w/ Doppler (12.10.24 @ 11:48) >  -----  CONCLUSIONS:     1. Normal left ventricular size and systolic function.   2. Mid anteroseptal segment is hypokinetic. Remainder of wall segments   are normal in motion.   3. Normal right ventricular size and systolic function.   4. No significant valvular disease.   5. No evidence of pulmonary hypertension.   6. No pericardial effusion.   7. Compared to the previous TTE performed on 7/17/2024, left ventricular   function has improved.    < end of copied text >

## 2024-12-10 NOTE — OCCUPATIONAL THERAPY INITIAL EVALUATION ADULT - LIVES WITH, PROFILE
Pt lives with her 9 year old daughter (she has an 18 year old son, who can assist if needed). Patient at baseline is ind for ADLs (typically performs sponge bathing) and functional mobility with straight cane. Pt has shower/tub./children

## 2024-12-10 NOTE — PROGRESS NOTE ADULT - ASSESSMENT
43 y/o F w/ PMHx of HFrEF (EF 45-50% 07/2024) 2/2 NICM (s/p PCI w/ LI x1 to LAD in 02/2024), SLE (on Plaquenil), chronic DAVID 2/2 heavy menses, spinal stenosis, and GERD p/w worsening episodic SOB for past 2d, admitted for dyspnea workup. Patient is a 44F w/ PMHx of HFrEF (EF 45-50% 07/2024) 2/2 NICM (s/p PCI w/ LI x1 to LAD in 02/2024), SLE (on Plaquenil), chronic DAVID 2/2 heavy menses, spinal stenosis, and GERD p/w worsening episodic SOB for past 2d, admitted for dyspnea workup.

## 2024-12-10 NOTE — CONSULT NOTE ADULT - ATTENDING COMMENTS
Outpatient reported temp of 103 with chills, LLL opacity all suggesting pna in immunocompromised patient.  Plz r/o legionnaires dz.  other ddx included focal bleeding related to antiplatelets use and capillaritis from underlying sle.

## 2024-12-10 NOTE — OCCUPATIONAL THERAPY INITIAL EVALUATION ADULT - PERTINENT HX OF CURRENT PROBLEM, REHAB EVAL
44F w/ PMHx of HFrEF (EF 45-50% 07/2024) 2/2 NICM (s/p PCI w/ LI x1 to LAD in 02/2024), SLE (on Plaquenil), chronic DAVID 2/2 heavy menses, spinal stenosis, and GERD and no prior history of asthma/COPD presents with two day history of worsening episodic dyspnea on exertion>at rest following probably viral illness associated with subjective fevers, chills and diarrhea. Given elevated d-dimer CTPE performed showing No central pulmonary embolism, patchy GGO in left lower lobe. . TTE ordered to assess for possible pulmonary HTN. Cannot exclude vasculitis given autoimmune condition although no cutaneous, renal or neurological manifestations .TTE pending read, pulm consulted c/f possible vasculitis and further work up. See working ddx below.  Restarted GDMT - Entresto BID and spironolactone 25md qd, with plan to restart lopressor as patient tolerates.

## 2024-12-10 NOTE — CONSULT NOTE ADULT - ASSESSMENT
43 yo F w/ CAD s/p PCI 2/2024 on asn/brillinta, SLE on plaquenil, DAVID who presented w/ SOB, fever/chills, dry cough, URTI sypmtoms and diarrhea.    Pulmonary consulted for SOB and CT scan findings of dense GGO in the left lower lobe.    Data Review:  CTPE - negative for PE, dense area of GGO focal to the left lower lobe  CXR - left basilar infitlrate  flu, covid, rsv negative  Na low    # Left lower lobe consolidation    Differential includes infectious (pneumonia) given symptoms of fever/chills, SOB and focal lower lobe findings on CT scan and CXR. She has underlying autoimmune condition and on Plaquenil that increase her risk for infectious etiology. She also has diarrhea/low sodium so would be worth ruling out legionnaire's disease w/ urine legionella. Other causes for her abnormal CT scan although less likely include focal alveolar hemorrhage in the setting of aspirin/brillinta use. She is denying any hemoptysis or blood tinged sputum so less likely. Other differential is SLE related capillaritis but again must treat first for infectious causes in the absence of systemic autoimmune disease.     Recommendations:  start ceftriaxone/azithromycin for CAP  obtain urine legionella to rule out legionare's (low Na, diarrhea, pneumonia)  obtain urine strep Ag  obtain autoimmune serology (CRP, ESR, dsDNA, C3, c4, anca) to evaluate for inflammatory process  patient should received 5-7 days of CAP treatment. We will see her outpatient (Dr. Lara) and obtain repeat CT scan in 4-6 weeks to determien if further studies are needed to evalaute CT scan findings if they persist despite abx/time    Seen and discussed w/ Dr. Lara  45 yo F w/ CAD s/p PCI 2/2024 on asn/brillinta, SLE on plaquenil, DAVID who presented w/ SOB, fever/chills, dry cough, URTI sypmtoms and diarrhea.    Pulmonary consulted for SOB and CT scan findings of dense GGO in the left lower lobe.    Data Review:  CTPE - negative for PE, dense area of GGO focal to the left lower lobe  CXR - left basilar infitlrate  flu, covid, rsv negative  Na low  echo - largely unremarkable    # Left lower lobe consolidation    Differential includes infectious (pneumonia) given symptoms of fever/chills, SOB and focal lower lobe findings on CT scan and CXR. She has underlying autoimmune condition and on Plaquenil that increase her risk for infectious etiology. She also has diarrhea/low sodium so would be worth ruling out legionnaire's disease w/ urine legionella. Other causes for her abnormal CT scan although less likely include focal alveolar hemorrhage in the setting of aspirin/brillinta use. She is denying any hemoptysis or blood tinged sputum so less likely. Other differential is SLE related capillaritis but again must treat first for infectious causes in the absence of systemic autoimmune disease.     Recommendations:  start ceftriaxone/azithromycin for CAP  obtain urine legionella to rule out legionare's (low Na, diarrhea, pneumonia)  obtain urine strep Ag  obtain autoimmune serology (CRP, ESR, dsDNA, C3, c4, anca) to evaluate for inflammatory process  patient should received 5-7 days of CAP treatment. We will see her outpatient (Dr. Lara) and obtain repeat CT scan in 4-6 weeks to determien if further studies are needed to evalaute CT scan findings if they persist despite abx/time    Seen and discussed w/ Dr. Lara

## 2024-12-10 NOTE — PROGRESS NOTE ADULT - PROBLEM SELECTOR PLAN 1
DDx: blood loss anemia 2/2 menses, though last menstrual period ended 11/20/24. JOLENE and deconditioning from recent illness possible contributors. Central PE r/o on CTPE; however peripheral PE possible given h/o SLE and elevated d-dimer, though BNP, trops, and HR wnl, satting well on RA. Of note: ED provider c/f subsegmental PE on CTPE  PNA less likely given absent fevers and nl WBC, though w/ ground glass opacities on CT. |  C/o episodic "gasping for air" for past 2d, not associated w/ time of day, environment, exertion, or position, or anxiety. Prior to this, experienced 2-4d or fever/chills and diarrhea, now resolved. No h/o COPD, asthma, allergies.     Plan:  - F/u TTE  - Holding off repeat CTPE for now  - Anemia workup as below  - PT/OT consulted Patient with no prior history of asthma/COPD presents with two day history of worsening episodic dyspnea on exertion>at rest following probably viral illness associated with subjective fevers, chills and dirrheha.   Working differential include dyspnea iso blood loss anemia 2/2 heavy menses, though last menstrual period ended 11/20/24. JOLENE and deconditioning from recent illness possible contributors. Given h/o SLE and elevated d-dimer, CTPE performed though BNP, trops, and HR wnl, satting well on RA.  PNA less likely given absent fevers and nl WBC, though w/ ground glass opacities on CT. TTE ordered to assess for possible pulmonary HTN given risk factors and hoarsness noted during interview. Cannot exclude vasculitis given autoimmune condition although no cutaneous, renal or neurological manifestations.   CXR: LLL consolidation with R focal atelectasis; CT PE: No central pulmonary embolism, patchy GGO in left lower lobe  Pulm consulted, pending further recs   - F/u TTE  - Anemia workup as below  - PT/OT consulted

## 2024-12-10 NOTE — PHYSICAL THERAPY INITIAL EVALUATION ADULT - PERTINENT HX OF CURRENT PROBLEM, REHAB EVAL
Patient is a 44F w/ PMHx of HFrEF (EF 45-50% 07/2024) 2/2 NICM (s/p PCI w/ LI x1 to LAD in 02/2024), SLE (on Plaquenil), chronic DAVID 2/2 heavy menses, spinal stenosis, and GERD and no prior history of asthma/COPD presents with two day history of worsening episodic dyspnea on exertion>at rest following probably viral illness associated with subjective fevers, chills and diarrhea. Given elevated d-dimer CTPE performed showing No central pulmonary embolism, patchy GGO in left lower lobe. . TTE ordered to assess for possible pulmonary HTN. Cannot exclude vasculitis given autoimmune condition although no cutaneous, renal or neurological manifestations .TTE pending read, pulm consulted c/f possible vasculitis and further work up. See working ddx below.  Restarted GDMT - Entresto BID and spironolactone 25md qd, with plan to restart lopressor as patient tolerates.

## 2024-12-10 NOTE — PROGRESS NOTE ADULT - PROBLEM SELECTOR PLAN 2
2/2 ICM (s/p PCI w/ LI x1 to pLAD in 02/2024). Holding home GDMT for hypotension       - 07/2024 TTE: LVEF 45-50%, apical hypokinesis, RV function intact, atria nl, no valvular dz.    Plan:  - C/w home ASA qd  - C/w home Brilinta 60 BID  - C/w home atorvastatin 80 qHS  - Holding home spironolactone 25 BID  - Holding home Entresto 24-26 BID  - Holding home Lopressor 25 BID  - Holding home Farxiga 10mg qd 2/2 ICM (s/p PCI w/ LI x1 to pLAD in 02/2024). Holding home GDMT for hypotension       - 07/2024 TTE: LVEF 45-50%, apical hypokinesis, RV function intact, atria nl, no valvular dz.  Home medications: ASA qd, Brilinta 60 BID,  atorvastatin 80 qHS, spironolactone 25 qd ,  Entresto 24-26 BID  - continue c/w home meds with strict holding parameters   - Holding home Lopressor 25 BID for now   - Holding home Farxiga 10mg qd

## 2024-12-10 NOTE — PHYSICAL THERAPY INITIAL EVALUATION ADULT - ADDITIONAL COMMENTS
Pt. lives in an elevator access apt. with her 2 children (ages 8 and 18.) At baseline, ambulates independently with a SC. Reports that at baseline she walks limited distances due to fatigue, however in the past week or so she has been more limited due to c/o "racing heart" and "gasping for breath" episodes during ambulation

## 2024-12-11 ENCOUNTER — TRANSCRIPTION ENCOUNTER (OUTPATIENT)
Age: 44
End: 2024-12-11

## 2024-12-11 VITALS
DIASTOLIC BLOOD PRESSURE: 69 MMHG | OXYGEN SATURATION: 100 % | TEMPERATURE: 99 F | SYSTOLIC BLOOD PRESSURE: 105 MMHG | RESPIRATION RATE: 17 BRPM | HEART RATE: 74 BPM

## 2024-12-11 LAB
ALBUMIN SERPL ELPH-MCNC: 3.3 G/DL — SIGNIFICANT CHANGE UP (ref 3.3–5)
ALP SERPL-CCNC: 65 U/L — SIGNIFICANT CHANGE UP (ref 40–120)
ALT FLD-CCNC: 25 U/L — SIGNIFICANT CHANGE UP (ref 10–45)
ANION GAP SERPL CALC-SCNC: 11 MMOL/L — SIGNIFICANT CHANGE UP (ref 5–17)
AST SERPL-CCNC: 27 U/L — SIGNIFICANT CHANGE UP (ref 10–40)
BASOPHILS # BLD AUTO: 0.09 K/UL — SIGNIFICANT CHANGE UP (ref 0–0.2)
BASOPHILS NFR BLD AUTO: 2.6 % — HIGH (ref 0–2)
BILIRUB SERPL-MCNC: 0.3 MG/DL — SIGNIFICANT CHANGE UP (ref 0.2–1.2)
BUN SERPL-MCNC: 12 MG/DL — SIGNIFICANT CHANGE UP (ref 7–23)
CALCIUM SERPL-MCNC: 8.8 MG/DL — SIGNIFICANT CHANGE UP (ref 8.4–10.5)
CHLORIDE SERPL-SCNC: 106 MMOL/L — SIGNIFICANT CHANGE UP (ref 96–108)
CO2 SERPL-SCNC: 17 MMOL/L — LOW (ref 22–31)
CREAT SERPL-MCNC: 0.82 MG/DL — SIGNIFICANT CHANGE UP (ref 0.5–1.3)
CRP SERPL-MCNC: 22.5 MG/L — HIGH (ref 0–4)
DSDNA AB SER-ACNC: 7 IU/ML — HIGH
EGFR: 91 ML/MIN/1.73M2 — SIGNIFICANT CHANGE UP
EOSINOPHIL # BLD AUTO: 0.13 K/UL — SIGNIFICANT CHANGE UP (ref 0–0.5)
EOSINOPHIL NFR BLD AUTO: 3.5 % — SIGNIFICANT CHANGE UP (ref 0–6)
ERYTHROCYTE [SEDIMENTATION RATE] IN BLOOD: 55 MM/HR — HIGH
GLUCOSE SERPL-MCNC: 116 MG/DL — HIGH (ref 70–99)
HCT VFR BLD CALC: 29.4 % — LOW (ref 34.5–45)
HGB BLD-MCNC: 8.7 G/DL — LOW (ref 11.5–15.5)
LEGIONELLA AG UR QL: NEGATIVE — SIGNIFICANT CHANGE UP
LYMPHOCYTES # BLD AUTO: 1.48 K/UL — SIGNIFICANT CHANGE UP (ref 1–3.3)
LYMPHOCYTES # BLD AUTO: 40.9 % — SIGNIFICANT CHANGE UP (ref 13–44)
MAGNESIUM SERPL-MCNC: 1.6 MG/DL — SIGNIFICANT CHANGE UP (ref 1.6–2.6)
MCHC RBC-ENTMCNC: 21.2 PG — LOW (ref 27–34)
MCHC RBC-ENTMCNC: 29.6 G/DL — LOW (ref 32–36)
MCV RBC AUTO: 71.5 FL — LOW (ref 80–100)
MONOCYTES # BLD AUTO: 0.31 K/UL — SIGNIFICANT CHANGE UP (ref 0–0.9)
MONOCYTES NFR BLD AUTO: 8.7 % — SIGNIFICANT CHANGE UP (ref 2–14)
NEUTROPHILS # BLD AUTO: 1.54 K/UL — LOW (ref 1.8–7.4)
NEUTROPHILS NFR BLD AUTO: 41.7 % — LOW (ref 43–77)
PHOSPHATE SERPL-MCNC: 3.1 MG/DL — SIGNIFICANT CHANGE UP (ref 2.5–4.5)
PLATELET # BLD AUTO: 307 K/UL — SIGNIFICANT CHANGE UP (ref 150–400)
POTASSIUM SERPL-MCNC: 4.6 MMOL/L — SIGNIFICANT CHANGE UP (ref 3.5–5.3)
POTASSIUM SERPL-SCNC: 4.6 MMOL/L — SIGNIFICANT CHANGE UP (ref 3.5–5.3)
PROT SERPL-MCNC: 7.9 G/DL — SIGNIFICANT CHANGE UP (ref 6–8.3)
RBC # BLD: 4.11 M/UL — SIGNIFICANT CHANGE UP (ref 3.8–5.2)
RBC # FLD: 20.9 % — HIGH (ref 10.3–14.5)
S PNEUM AG UR QL: NEGATIVE — SIGNIFICANT CHANGE UP
SODIUM SERPL-SCNC: 134 MMOL/L — LOW (ref 135–145)
WBC # BLD: 3.62 K/UL — LOW (ref 3.8–10.5)
WBC # FLD AUTO: 3.62 K/UL — LOW (ref 3.8–10.5)

## 2024-12-11 PROCEDURE — 71046 X-RAY EXAM CHEST 2 VIEWS: CPT

## 2024-12-11 PROCEDURE — 83880 ASSAY OF NATRIURETIC PEPTIDE: CPT

## 2024-12-11 PROCEDURE — 97165 OT EVAL LOW COMPLEX 30 MIN: CPT

## 2024-12-11 PROCEDURE — 82728 ASSAY OF FERRITIN: CPT

## 2024-12-11 PROCEDURE — 85379 FIBRIN DEGRADATION QUANT: CPT

## 2024-12-11 PROCEDURE — 86900 BLOOD TYPING SEROLOGIC ABO: CPT

## 2024-12-11 PROCEDURE — 83935 ASSAY OF URINE OSMOLALITY: CPT

## 2024-12-11 PROCEDURE — 85652 RBC SED RATE AUTOMATED: CPT

## 2024-12-11 PROCEDURE — 36415 COLL VENOUS BLD VENIPUNCTURE: CPT

## 2024-12-11 PROCEDURE — 83605 ASSAY OF LACTIC ACID: CPT

## 2024-12-11 PROCEDURE — 80053 COMPREHEN METABOLIC PANEL: CPT

## 2024-12-11 PROCEDURE — 82570 ASSAY OF URINE CREATININE: CPT

## 2024-12-11 PROCEDURE — 85025 COMPLETE CBC W/AUTO DIFF WBC: CPT

## 2024-12-11 PROCEDURE — 86901 BLOOD TYPING SEROLOGIC RH(D): CPT

## 2024-12-11 PROCEDURE — 84300 ASSAY OF URINE SODIUM: CPT

## 2024-12-11 PROCEDURE — 99285 EMERGENCY DEPT VISIT HI MDM: CPT | Mod: 25

## 2024-12-11 PROCEDURE — 97161 PT EVAL LOW COMPLEX 20 MIN: CPT

## 2024-12-11 PROCEDURE — 84466 ASSAY OF TRANSFERRIN: CPT

## 2024-12-11 PROCEDURE — 84133 ASSAY OF URINE POTASSIUM: CPT

## 2024-12-11 PROCEDURE — 83540 ASSAY OF IRON: CPT

## 2024-12-11 PROCEDURE — 99239 HOSP IP/OBS DSCHRG MGMT >30: CPT | Mod: GC

## 2024-12-11 PROCEDURE — 87899 AGENT NOS ASSAY W/OPTIC: CPT

## 2024-12-11 PROCEDURE — 80048 BASIC METABOLIC PNL TOTAL CA: CPT

## 2024-12-11 PROCEDURE — 84145 PROCALCITONIN (PCT): CPT

## 2024-12-11 PROCEDURE — 86850 RBC ANTIBODY SCREEN: CPT

## 2024-12-11 PROCEDURE — 84484 ASSAY OF TROPONIN QUANT: CPT

## 2024-12-11 PROCEDURE — 86160 COMPLEMENT ANTIGEN: CPT

## 2024-12-11 PROCEDURE — 86225 DNA ANTIBODY NATIVE: CPT

## 2024-12-11 PROCEDURE — 87449 NOS EACH ORGANISM AG IA: CPT

## 2024-12-11 PROCEDURE — 86140 C-REACTIVE PROTEIN: CPT

## 2024-12-11 PROCEDURE — 86036 ANCA SCREEN EACH ANTIBODY: CPT

## 2024-12-11 PROCEDURE — 83550 IRON BINDING TEST: CPT

## 2024-12-11 PROCEDURE — 87637 SARSCOV2&INF A&B&RSV AMP PRB: CPT

## 2024-12-11 PROCEDURE — 84156 ASSAY OF PROTEIN URINE: CPT

## 2024-12-11 PROCEDURE — 99232 SBSQ HOSP IP/OBS MODERATE 35: CPT | Mod: GC

## 2024-12-11 PROCEDURE — 93005 ELECTROCARDIOGRAM TRACING: CPT

## 2024-12-11 PROCEDURE — 85027 COMPLETE CBC AUTOMATED: CPT

## 2024-12-11 PROCEDURE — 84100 ASSAY OF PHOSPHORUS: CPT

## 2024-12-11 PROCEDURE — 83735 ASSAY OF MAGNESIUM: CPT

## 2024-12-11 PROCEDURE — 84540 ASSAY OF URINE/UREA-N: CPT

## 2024-12-11 PROCEDURE — C8929: CPT

## 2024-12-11 PROCEDURE — 71275 CT ANGIOGRAPHY CHEST: CPT | Mod: MC

## 2024-12-11 RX ORDER — AZITHROMYCIN 250 MG/1
1 TABLET, FILM COATED ORAL
Qty: 0 | Refills: 0 | DISCHARGE
Start: 2024-12-11

## 2024-12-11 RX ORDER — AZITHROMYCIN 250 MG/1
1 TABLET, FILM COATED ORAL
Qty: 3 | Refills: 0
Start: 2024-12-11 | End: 2024-12-13

## 2024-12-11 RX ORDER — CEFPODOXIME PROXETIL 100 MG/5ML
1 GRANULE, FOR SUSPENSION ORAL
Qty: 10 | Refills: 0
Start: 2024-12-11 | End: 2024-12-15

## 2024-12-11 RX ADMIN — SACUBITRIL AND VALSARTAN 1 TABLET(S): 24; 26 TABLET, FILM COATED ORAL at 06:45

## 2024-12-11 RX ADMIN — Medication 81 MILLIGRAM(S): at 06:44

## 2024-12-11 RX ADMIN — METOPROLOL TARTRATE 25 MILLIGRAM(S): 100 TABLET, FILM COATED ORAL at 06:45

## 2024-12-11 RX ADMIN — ENOXAPARIN SODIUM 40 MILLIGRAM(S): 30 INJECTION SUBCUTANEOUS at 06:45

## 2024-12-11 RX ADMIN — Medication 25 GRAM(S): at 09:47

## 2024-12-11 RX ADMIN — HYDROXYCHLOROQUINE SULFATE 200 MILLIGRAM(S): 200 TABLET, FILM COATED ORAL at 06:44

## 2024-12-11 RX ADMIN — TICAGRELOR 60 MILLIGRAM(S): 90 TABLET ORAL at 06:45

## 2024-12-11 RX ADMIN — Medication 25 MILLIGRAM(S): at 13:01

## 2024-12-11 NOTE — DISCHARGE NOTE NURSING/CASE MANAGEMENT/SOCIAL WORK - FINANCIAL ASSISTANCE
[facility] provides services at a reduced cost to those who are determined to be eligible through [facility]’s financial assistance program. Information regarding [facility]’s financial assistance program can be found by going to [email or web address] or by calling [phone].

## 2024-12-11 NOTE — PROGRESS NOTE ADULT - PROBLEM SELECTOR PLAN 3
Follows w/ rheumatologist at [facility]    Plan:  - C/w home Plaquenil 200mg q12h
Follows w/ rheumatologist at [facility]    Plan:  - C/w home Plaquenil 200mg q12h
Follows w/ rheumatologist at [facility]    Plan:  - C/w home Plaquenil 200mg q12h

## 2024-12-11 NOTE — PROGRESS NOTE ADULT - ASSESSMENT
45 yo F w/ CAD s/p PCI 2/2024 on asn/brillinta, SLE on plaquenil, DAVID who presented w/ SOB, fever/chills, dry cough, URTI sypmtoms and diarrhea.    Pulmonary consulted for SOB and CT scan findings of dense GGO in the left lower lobe.    Data Review:  CTPE - negative for PE, dense area of GGO focal to the left lower lobe  CXR - left basilar infitlrate  flu, covid, rsv negative  Na low  echo - largely unremarkable, normalized LV function and size w/ RWMA, RV normal size/function.    # Left lower lobe consolidation    Differential includes infectious (pneumonia) given symptoms of fever/chills, SOB and focal lower lobe findings on CT scan and CXR. She has underlying autoimmune condition and on Plaquenil that increase her risk for infectious etiology. She also has diarrhea/low sodium so would be worth ruling out legionnaire's disease w/ urine legionella. Other causes for her abnormal CT scan although less likely include focal alveolar hemorrhage in the setting of aspirin/brillinta use. She is denying any hemoptysis or blood tinged sputum so less likely. Other differential is SLE related capillaritis but again must treat first for infectious causes in the absence of systemic autoimmune disease.     Recommendations:  c/w ceftriaxone/azithromycin for CAP  f/u urine legionella to rule out legionare's (low Na, diarrhea, pneumonia)  f/u urine strep Ag  obtain autoimmune serology (CRP, ESR, dsDNA, C3, c4, anca) to evaluate for inflammatory process  patient should received 5-7 days of CAP treatment. We will see her outpatient (Dr. Lara) and obtain repeat CT scan in 4-6 weeks to determien if further studies are needed to evalaute CT scan findings if they persist despite abx/time    to be discussed w/ Dr. Lara

## 2024-12-11 NOTE — PROGRESS NOTE ADULT - PROBLEM SELECTOR PLAN 1
Patient with no prior history of asthma/COPD presents with two day history of worsening episodic dyspnea on exertion>at rest following probably viral illness associated with subjective fevers, chills and dirrheha.   Working differential include dyspnea iso blood loss anemia 2/2 heavy menses, though last menstrual period ended 11/20/24. JOLENE and deconditioning from recent illness possible contributors. Given h/o SLE and elevated d-dimer, Cannot exclude vasculitis given autoimmune condition although no cutaneous, renal or neurological manifestations. CTPE negative. PNA less likely given absent fevers and nl WBC, though w/ ground glass opacities on CT.   CXR: LLL consolidation with R focal atelectasis; CT PE: No central pulmonary embolism, patchy GGO in left lower lobe. TTE negative. Urine legionella/strep negative.     - F/u sputum cx  - C/w CTX 1g + azithromycin 500 mg for CAP coverage   -----will be discharged with cefpodoxime 200 mg BID x  5-7 days (12/10-12/14) AND azithromycin 500 mg x 5 days (12/10-12/14)  - Anemia workup as below  - f/u with pulmonology outpatient within 4-6 weeks post-discharge

## 2024-12-11 NOTE — PROGRESS NOTE ADULT - ASSESSMENT
Patient is a 44F w/ PMHx of HFrEF (EF 45-50% 07/2024) 2/2 NICM (s/p PCI w/ LI x1 to LAD in 02/2024), SLE (on Plaquenil), chronic DAVID 2/2 heavy menses, spinal stenosis, and GERD p/w worsening episodic SOB for past 3d, admitted for dyspnea workup.

## 2024-12-11 NOTE — PROGRESS NOTE ADULT - SUBJECTIVE AND OBJECTIVE BOX
PULMONARY CONSULT SERVICE FOLLOW-UP NOTE    INTERVAL HPI:  Reviewed chart and overnight events; patient seen and examined. Now reporting productive cough, yellow. SOB improving, able to ambulate w/ PT yesterday. Feels slightly better than yesterday.      MEDICATIONS:  Pulmonary:    Antimicrobials:  azithromycin   Tablet 500 milliGRAM(s) Oral every 24 hours  cefTRIAXone   IVPB 2000 milliGRAM(s) IV Intermittent every 24 hours  hydroxychloroquine 200 milliGRAM(s) Oral every 12 hours    Anticoagulants:  aspirin enteric coated 81 milliGRAM(s) Oral every 24 hours  enoxaparin Injectable 40 milliGRAM(s) SubCutaneous every 12 hours  ticagrelor 60 milliGRAM(s) Oral every 12 hours    Cardiac:  metoprolol tartrate 25 milliGRAM(s) Oral two times a day  sacubitril 24 mG/valsartan 26 mG 1 Tablet(s) Oral two times a day  spironolactone 25 milliGRAM(s) Oral every 24 hours      Allergies    No Known Allergies    Intolerances        Vital Signs Last 24 Hrs  T(C): 36.7 (11 Dec 2024 05:43), Max: 37.1 (10 Dec 2024 14:12)  T(F): 98.1 (11 Dec 2024 05:43), Max: 98.7 (10 Dec 2024 14:12)  HR: 76 (11 Dec 2024 05:43) (76 - 88)  BP: 111/76 (11 Dec 2024 05:43) (94/63 - 115/75)  BP(mean): --  RR: 18 (11 Dec 2024 05:43) (18 - 18)  SpO2: 98% (11 Dec 2024 05:43) (98% - 99%)    Parameters below as of 11 Dec 2024 05:43  Patient On (Oxygen Delivery Method): room air            PHYSICAL EXAM:  Constitutional: well-appearing, in no apparent respiratory distress  HEENT: NC/AT; PERRL, anicteric sclera; moist mucous membranes  Neck: supple, no JVD or LAD appreciated  Cardiovascular: +S1/S2, Regular rate and rhythm, no murmurs appreciated   Respiratory: Clear breath sounds bilaterally with slight coarse crackle at left base. No wheezes, rhonchi   Gastrointestinal: soft, non-tender, non-distended. Normoactive bowel sounds.   Extremities: no edema, clubbing or cyanosis  Vascular: 2+ radial pulses B/L  Neurological: awake and alert; oriented x3    LABS:      CBC Full  -  ( 11 Dec 2024 05:30 )  WBC Count : 3.62 K/uL  RBC Count : 4.11 M/uL  Hemoglobin : 8.7 g/dL  Hematocrit : 29.4 %  Platelet Count - Automated : 307 K/uL  Mean Cell Volume : 71.5 fl  Mean Cell Hemoglobin : 21.2 pg  Mean Cell Hemoglobin Concentration : 29.6 g/dL  Auto Neutrophil # : x  Auto Lymphocyte # : x  Auto Monocyte # : x  Auto Eosinophil # : x  Auto Basophil # : x  Auto Neutrophil % : x  Auto Lymphocyte % : x  Auto Monocyte % : x  Auto Eosinophil % : x  Auto Basophil % : x    12-11    134[L]  |  106  |  12  ----------------------------<  116[H]  4.6   |  17[L]  |  0.82    Ca    8.8      11 Dec 2024 05:30  Phos  3.1     12-11  Mg     1.6     12-11    TPro  7.9  /  Alb  3.3  /  TBili  0.3  /  DBili  x   /  AST  27  /  ALT  25  /  AlkPhos  65  12-11                    RADIOLOGY & ADDITIONAL STUDIES: cxr and ctpe from admission reviewed

## 2024-12-11 NOTE — DISCHARGE NOTE PROVIDER - NSDCFUSCHEDAPPT_GEN_ALL_CORE_FT
[facility]  HEARTVASC [address]  Scheduled Appointment: 01/09/2025     [facility]  HEARTVASC [address]  Scheduled Appointment: 01/09/2025    Clarissa Lara  [facility]  PULMMED [address]  Scheduled Appointment: 01/09/2025

## 2024-12-11 NOTE — PROGRESS NOTE ADULT - PROBLEM SELECTOR PLAN 2
2/2 ICM (s/p PCI w/ LI x1 to pLAD in 02/2024). Holding home GDMT for hypotension       - 07/2024 TTE: LVEF 45-50%, apical hypokinesis, RV function intact, atria nl, no valvular dz.  Home medications: ASA qd, Brilinta 60 BID,  atorvastatin 80 qHS, spironolactone 25 qd ,  Entresto 24-26 BID  - resumed GDMT 12/10

## 2024-12-11 NOTE — DISCHARGE NOTE PROVIDER - CARE PROVIDER_API CALL
Clarissa Lara  Critical Care Medicine  [address] [address]  Phone: [phone]  Fax: [phone]  Scheduled Appointment: 12/11/2025 01:30 PM   Clarissa Lara  Critical Care Medicine  [address] [address]  Phone: [phone]  Fax: [phone]  Scheduled Appointment: 01/09/2025 01:30 PM

## 2024-12-11 NOTE — DISCHARGE NOTE PROVIDER - NSDCCPCAREPLAN_GEN_ALL_CORE_FT
PRINCIPAL DISCHARGE DIAGNOSIS  Diagnosis: Community acquired pneumonia  Assessment and Plan of Treatment: You initially presented with shortness of breath and were found to have an "opacity" in the lower lobe of your left lung. This finding can be suggestive of pneumonia. Pneumonia is an infection of the lungs. Most cases are caused by infections from bacteria or viruses. Pneumonia may be mild or very severe. It may take a few weeks to a few months to recover fully from pneumonia, depending on how sick you were and whether your overall health is good. You were started on antibiotic treatment during your hospital stay. You have received 2 doses of CEFTRIAXONE 1g and AZITHROMYCIN 500mg each during this admission.   You will be discharged with AZITHROMYCIN 500 MG and CEFPODOXIME 200 MG to treat your suspected pneumonia. Cefpdoxime is the oral/pill form of Ceftriaxone, the antibiotic you were receiving in the hospital. Please take these antibiotics as directed. Be sure to follow up with PULMONOLOGY (lung doctors) on January 9th, 2025 at 1:30 pm.

## 2024-12-11 NOTE — PROGRESS NOTE ADULT - ATTENDING COMMENTS
Likely cap, improving with abx. Would treat for total of 5 days.   F/u with me, will need repeat chest CT in 4-6 week.
43 yo F with a PMH of HFrEF (EF 45-50% 07/2024) 2/2 NICM (s/p PCI w/ LI x1 to LAD in 02/2024), SLE (on Plaquenil), chronic DAVID 2/2 heavy menses, spinal stenosis, and GERD and no prior history of asthma/COPD presented with a few days of random seeming intermittent episodic gasping/dyspnea. Of note, had a recent viral illness with fevers, chills, and diarrhea, all of which have resolved now. Underwent workup including CT PE without evidence of PE but showing LLL haziness, TTE similar to prior.     VS reviewed and stable on room air     Exam:   Appears comfortable   MMM  Normal WOB on RA, very limited air movement heard on auscultation across all lung fields   RRR, no mrg   Abdomen soft, nontender, nondistended  Extremities warm and without edema   AOX3, no focal neuro deficits     Labs reviewed  WBC 3.6, rest of CBC stable   BMP with Na improved to 134  CRP decreased to 22.5    TTE reviewed, LV function now normal, no signs of pulmonary HTN     A/P:   -Dyspnea episodes: likely multifactorial, could be related to anemia as well as possible infection vs inflammatory/autoimmune process in lung. Appreciate pulmonary recommendations, autoimmune markers sent, started on ceftriaxone and azithromycin. Pending legionella urinary antigen.   -HFmrEF: restart all GDMT, spironolactone, farxiga, metoprolol, entresto, TTE reviewed and improved from priors   -CAD s/p stent in 2024: continue aspirin and brillinta, atorvastatin   -SLE: continue home hydroxychloroquine     Rest as per resident note.
45 yo F with a PMH of HFrEF (EF 45-50% 07/2024) 2/2 NICM (s/p PCI w/ LI x1 to LAD in 02/2024), SLE (on Plaquenil), chronic DAVID 2/2 heavy menses, spinal stenosis, and GERD and no prior history of asthma/COPD presented with a few days of random seeming intermittent episodic gasping/dyspnea. Of note, had a recent viral illness with fevers, chills, and diarrhea, all of which have resolved now. Underwent workup including CT PE without evidence of PE but showing LLL haziness, TTE pending read.     VS reviewed and stable on room air     Exam:   Appears comfortable   MMM  Normal WOB on RA, very limited air movement heard on auscultation across all lung fields   RRR, no mrg   Abdomen soft, nontender, nondistended  Extremities warm and without edema   AOX3, no focal neuro deficits     Labs reviewed  WBC 3.5, rest of CBC stable   BMP with Na 132, Cr improved to baseline  Procal slightly elevated to 0.31     Imaging with CT PE protocol, LLL density GGO, no PE identified     A/P:   -Dyspnea episodes: likely multifactorial, patient is slightly anemic though hemoglobin is stable here, could be deconditioned iso recent illness, density on CT scan could be infectious vs inflammatory. Appreciate pulmonary consult, await results of TTE as well. PT/OT consulted. Given shape of consolidation and focal nature, unlikely to be pulmonary edema. Procal fairly low, will discuss with pulmonary whether to treat for pneumonia.   -HFmrEF: follow up TTE. Restart her GDMT except farxiga (spironolactone 25 mg daily, entresto 24-26 mg BID, spironolactone 25 mg daily)  -CAD s/p stent in 2024: continue aspirin and brillinta, atorvastatin   -SLE: continue home hydroxychloroquine     Rest as per resident note.

## 2024-12-11 NOTE — DISCHARGE NOTE NURSING/CASE MANAGEMENT/SOCIAL WORK - PATIENT PORTAL LINK FT
You can access the FollowMyHealth Patient Portal offered by [facility] by registering at the following website: [email or web address]. By joining [employer]’s FollowMyHealth portal, you will also be able to view your health information using other applications (apps) compatible with our system.

## 2024-12-11 NOTE — PROGRESS NOTE ADULT - PROBLEM SELECTOR PROBLEM 3
H/O systemic lupus erythematosus (SLE)

## 2024-12-11 NOTE — DISCHARGE NOTE PROVIDER - ATTENDING DISCHARGE PHYSICAL EXAMINATION:
Appears comfortable   MMM  Normal WOB on RA, with decreased breath sounds throughout all lung fields   RRR, no mrg   Abdomen soft, nontender, nondistended  Extremities warm and without edema   AOX3, no focal neuro deficits

## 2024-12-11 NOTE — DISCHARGE NOTE PROVIDER - NSDCCPTREATMENT_GEN_ALL_CORE_FT
PRINCIPAL PROCEDURE  Procedure: CT chest w con  Findings and Treatment: 1.  No central pulmonary embolism. Respiratory motion and suboptimal   contrast bolus timing limits evaluation of the pulmonary vasculature.   Evaluation of the segmental and subsegmental pulmonary arteries is   nondiagnostic.  2.  Patchy groundglass opacity spanning the anteromedial and lateral   segments of the left lower lobe. Differential includes infection,   inflammation, and infarction.      SECONDARY PROCEDURE  Procedure: Complete transthoracic echocardiography (TTE)  Findings and Treatment: CONCLUSIONS:  1. Normal left ventricular size and systolic function. EF 55-60%.  2. Mid anteroseptal segment is hypokinetic. Remainder of wall segments are normal in motion.  3. Normal right ventricular size and systolic function.  4. No significant valvular disease.  5. No evidence of pulmonary hypertension.  6. No pericardial effusion.  7. Compared to the previous TTE performed on 7/17/2024, left ventricular function has improved.

## 2024-12-11 NOTE — PROGRESS NOTE ADULT - SUBJECTIVE AND OBJECTIVE BOX
Progress Note  INCOMPLETE NOTE    INTERVAL EVENTS:   No acute events overnight.    SUBJECTIVE:   Patient seen and examined at bedside. Condition largely unchanged from yesterday. No acute complaints at this time.    ROS:  Negative unless otherwise stated above.    PHYSICAL EXAM:  General: Alert and oriented x 3. No acute distress.   HEENT: Moist mucous membranes. Anicteric. No cervical lymphadenopathy.  Cardiovascular: Regular rate and rhythm. No murmur. Normal JVP.  Lungs: Clear to auscultation bilaterally. No accessory muscle use.  Abdomen: Soft, non-tender and non-distended. No palpable masses.  Extremities: No edema. Non-tender. Warm.  Skin: No rashes or lesions.   Neurologic: No apparent focal neurological deficits. CN II-XII grossly intact, but not individually tested.  Psychiatric: Cooperative. Appropriate mood and affect.    VITAL SIGNS:  Vital Signs Last 24 Hrs  T(C): 36.7 (11 Dec 2024 05:43), Max: 37.1 (10 Dec 2024 14:12)  T(F): 98.1 (11 Dec 2024 05:43), Max: 98.7 (10 Dec 2024 14:12)  HR: 76 (11 Dec 2024 05:43) (76 - 88)  BP: 111/76 (11 Dec 2024 05:43) (94/63 - 115/75)  BP(mean): --  RR: 18 (11 Dec 2024 05:43) (18 - 18)  SpO2: 98% (11 Dec 2024 05:43) (98% - 99%)    Parameters below as of 11 Dec 2024 05:43  Patient On (Oxygen Delivery Method): room air      INPATIENT MEDICATIONS:   MEDICATIONS  (STANDING):  aspirin enteric coated 81 milliGRAM(s) Oral every 24 hours  atorvastatin 80 milliGRAM(s) Oral at bedtime  azithromycin   Tablet 500 milliGRAM(s) Oral every 24 hours  cefTRIAXone   IVPB 2000 milliGRAM(s) IV Intermittent every 24 hours  enoxaparin Injectable 40 milliGRAM(s) SubCutaneous every 12 hours  hydroxychloroquine 200 milliGRAM(s) Oral every 12 hours  metoprolol tartrate 25 milliGRAM(s) Oral two times a day  sacubitril 24 mG/valsartan 26 mG 1 Tablet(s) Oral two times a day  spironolactone 25 milliGRAM(s) Oral every 24 hours  ticagrelor 60 milliGRAM(s) Oral every 12 hours    MEDICATIONS  (PRN):    ALLERGIES:  Allergies    No Known Allergies    Intolerances    LABS:                       8.7    3.50  )-----------( 336      ( 10 Dec 2024 05:30 )             28.8     12-10    132[L]  |  106  |  18  ----------------------------<  139[H]  3.8   |  16[L]  |  0.87    Ca    9.1      10 Dec 2024 05:30  Phos  3.5     12-10  Mg     1.8     12-10        Urinalysis Basic - ( 10 Dec 2024 05:30 )    Color: x / Appearance: x / SG: x / pH: x  Gluc: 139 mg/dL / Ketone: x  / Bili: x / Urobili: x   Blood: x / Protein: x / Nitrite: x   Leuk Esterase: x / RBC: x / WBC x   Sq Epi: x / Non Sq Epi: x / Bacteria: x      CAPILLARY BLOOD GLUCOSE        RADIOLOGY & ADDITIONAL TESTS: Reviewed. Progress Note    INTERVAL EVENTS:   As per pulm recs, started pt on CTX 1g and azithromycin 500 mg overnight. Pt's blood pressure 94-98/63-66. Clinically stable.     SUBJECTIVE:   Pt endorses that her cough he progressed, and she is now producing yellow sputum. Pt states her SOB has improved immensely since admission and does not have any difficulty breathing at this time.     ROS:  Negative unless otherwise stated above.    PHYSICAL EXAM:  General: Alert and oriented x 3. No acute distress.   HEENT: Moist mucous membranes. Anicteric. No cervical lymphadenopathy.  Cardiovascular: Regular rate and rhythm. No murmur. Normal JVP.  Lungs: Clear to auscultation bilaterally. No accessory muscle use.  Abdomen: Soft, non-tender and non-distended. No palpable masses.  Extremities: No edema. Non-tender. Warm.  Skin: No rashes or lesions.   Neurologic: No apparent focal neurological deficits. CN II-XII grossly intact, but not individually tested.  Psychiatric: Cooperative. Appropriate mood and affect.    VITAL SIGNS:  Vital Signs Last 24 Hrs  T(C): 36.7 (11 Dec 2024 05:43), Max: 37.1 (10 Dec 2024 14:12)  T(F): 98.1 (11 Dec 2024 05:43), Max: 98.7 (10 Dec 2024 14:12)  HR: 76 (11 Dec 2024 05:43) (76 - 88)  BP: 111/76 (11 Dec 2024 05:43) (94/63 - 115/75)  BP(mean): --  RR: 18 (11 Dec 2024 05:43) (18 - 18)  SpO2: 98% (11 Dec 2024 05:43) (98% - 99%)    Parameters below as of 11 Dec 2024 05:43  Patient On (Oxygen Delivery Method): room air      INPATIENT MEDICATIONS:   MEDICATIONS  (STANDING):  aspirin enteric coated 81 milliGRAM(s) Oral every 24 hours  atorvastatin 80 milliGRAM(s) Oral at bedtime  azithromycin   Tablet 500 milliGRAM(s) Oral every 24 hours  cefTRIAXone   IVPB 2000 milliGRAM(s) IV Intermittent every 24 hours  enoxaparin Injectable 40 milliGRAM(s) SubCutaneous every 12 hours  hydroxychloroquine 200 milliGRAM(s) Oral every 12 hours  metoprolol tartrate 25 milliGRAM(s) Oral two times a day  sacubitril 24 mG/valsartan 26 mG 1 Tablet(s) Oral two times a day  spironolactone 25 milliGRAM(s) Oral every 24 hours  ticagrelor 60 milliGRAM(s) Oral every 12 hours    MEDICATIONS  (PRN):    ALLERGIES:  Allergies    No Known Allergies    Intolerances    LABS:                       8.7    3.50  )-----------( 336      ( 10 Dec 2024 05:30 )             28.8     12-10    132[L]  |  106  |  18  ----------------------------<  139[H]  3.8   |  16[L]  |  0.87    Ca    9.1      10 Dec 2024 05:30  Phos  3.5     12-10  Mg     1.8     12-10        Urinalysis Basic - ( 10 Dec 2024 05:30 )    Color: x / Appearance: x / SG: x / pH: x  Gluc: 139 mg/dL / Ketone: x  / Bili: x / Urobili: x   Blood: x / Protein: x / Nitrite: x   Leuk Esterase: x / RBC: x / WBC x   Sq Epi: x / Non Sq Epi: x / Bacteria: x      CAPILLARY BLOOD GLUCOSE        RADIOLOGY & ADDITIONAL TESTS: Reviewed. Progress Note    INTERVAL EVENTS:   As per pulm recs, started pt on CTX 1g and azithromycin 500 mg overnight. Pt's blood pressure 94-98/63-66. Clinically stable.     SUBJECTIVE:   Pt endorses that her cough he progressed, and she is now producing yellow sputum. Pt states her SOB has improved immensely since admission and does not have any difficulty breathing at this time. Her last BM was yesterday, and feels like her soft BMs are starting to "form again." Denies f/c/n/v/cp/dysuria/hematochezia.     ROS:  Negative unless otherwise stated above.    PHYSICAL EXAM:  General: Alert and oriented x 3. No acute distress.   HEENT: PERRL, EOM intact, sclera anicteric, MMM  Cardiovascular: RRR; no MRG; no JVD  Respiratory: CTAB; no WRR  GI/: soft; NTND  Extremities: WWP; no LE edema  Skin: normal color & turgor; no rash    VITAL SIGNS:  Vital Signs Last 24 Hrs  T(C): 36.7 (11 Dec 2024 05:43), Max: 37.1 (10 Dec 2024 14:12)  T(F): 98.1 (11 Dec 2024 05:43), Max: 98.7 (10 Dec 2024 14:12)  HR: 76 (11 Dec 2024 05:43) (76 - 88)  BP: 111/76 (11 Dec 2024 05:43) (94/63 - 115/75)  BP(mean): --  RR: 18 (11 Dec 2024 05:43) (18 - 18)  SpO2: 98% (11 Dec 2024 05:43) (98% - 99%)    Parameters below as of 11 Dec 2024 05:43  Patient On (Oxygen Delivery Method): room air      INPATIENT MEDICATIONS:   MEDICATIONS  (STANDING):  aspirin enteric coated 81 milliGRAM(s) Oral every 24 hours  atorvastatin 80 milliGRAM(s) Oral at bedtime  azithromycin   Tablet 500 milliGRAM(s) Oral every 24 hours  cefTRIAXone   IVPB 2000 milliGRAM(s) IV Intermittent every 24 hours  enoxaparin Injectable 40 milliGRAM(s) SubCutaneous every 12 hours  hydroxychloroquine 200 milliGRAM(s) Oral every 12 hours  metoprolol tartrate 25 milliGRAM(s) Oral two times a day  sacubitril 24 mG/valsartan 26 mG 1 Tablet(s) Oral two times a day  spironolactone 25 milliGRAM(s) Oral every 24 hours  ticagrelor 60 milliGRAM(s) Oral every 12 hours    MEDICATIONS  (PRN):    ALLERGIES:  Allergies    No Known Allergies    Intolerances    LABS:                       8.7    3.50  )-----------( 336      ( 10 Dec 2024 05:30 )             28.8     12-10    132[L]  |  106  |  18  ----------------------------<  139[H]  3.8   |  16[L]  |  0.87    Ca    9.1      10 Dec 2024 05:30  Phos  3.5     12-10  Mg     1.8     12-10        Urinalysis Basic - ( 10 Dec 2024 05:30 )    Color: x / Appearance: x / SG: x / pH: x  Gluc: 139 mg/dL / Ketone: x  / Bili: x / Urobili: x   Blood: x / Protein: x / Nitrite: x   Leuk Esterase: x / RBC: x / WBC x   Sq Epi: x / Non Sq Epi: x / Bacteria: x      CAPILLARY BLOOD GLUCOSE        RADIOLOGY & ADDITIONAL TESTS: Reviewed.

## 2024-12-11 NOTE — PROGRESS NOTE ADULT - PROBLEM SELECTOR PLAN 5
F: ---  E: Maintain K >4, Mg >2  N: DASH  VTE ppx: Lovenox  Dispo: [facility]
F: ---  E: Maintain K >4, Mg >2  N: DASH  VTE ppx: Lovenox  Dispo: [facility]
F: ---  E: Maintain K >4, Mg >2  N: DASH  VTE ppx: Lovenox  Dispo: [facility]  Code: FULL CODE

## 2024-12-11 NOTE — DISCHARGE NOTE PROVIDER - NSDCMRMEDTOKEN_GEN_ALL_CORE_FT
aspirin 81 mg oral delayed release tablet: 1 tab(s) orally every 24 hours  atorvastatin 80 mg oral tablet: 1 tab(s) orally once a day (at bedtime)  Entresto 24 mg-26 mg oral tablet: 1 tab(s) orally 2 times a day Chronic Systolic CHF  Farxiga 10 mg oral tablet: 1 tab(s) orally once a day  metoprolol tartrate 25 mg oral tablet: 1 tab(s) orally every 12 hours  Plaquenil 200 mg oral tablet: 1 tab(s) orally every 12 hours  spironolactone 25 mg oral tablet: 1 tab(s) orally once a day  ticagrelor 90 mg oral tablet: 1 tab(s) orally every 12 hours Please take one tablet every 12 hours   aspirin 81 mg oral delayed release tablet: 1 tab(s) orally every 24 hours  atorvastatin 80 mg oral tablet: 1 tab(s) orally once a day (at bedtime)  Entresto 24 mg-26 mg oral tablet: 1 tab(s) orally 2 times a day Chronic Systolic CHF  Farxiga 10 mg oral tablet: 1 tab(s) orally once a day  metoprolol tartrate 25 mg oral tablet: 1 tab(s) orally every 12 hours  Occupational Therapy: ICD-10: M32.1, R53.81  Plaquenil 200 mg oral tablet: 1 tab(s) orally every 12 hours  spironolactone 25 mg oral tablet: 1 tab(s) orally once a day  ticagrelor 90 mg oral tablet: 1 tab(s) orally every 12 hours Please take one tablet every 12 hours   aspirin 81 mg oral delayed release tablet: 1 tab(s) orally every 24 hours  atorvastatin 80 mg oral tablet: 1 tab(s) orally once a day (at bedtime)  azithromycin 500 mg oral tablet: 1 tab(s) orally every 24 hours  cefpodoxime 200 mg oral tablet: 1 tab(s) orally every 12 hours  Entresto 24 mg-26 mg oral tablet: 1 tab(s) orally 2 times a day Chronic Systolic CHF  Farxiga 10 mg oral tablet: 1 tab(s) orally once a day  metoprolol tartrate 25 mg oral tablet: 1 tab(s) orally every 12 hours  Occupational Therapy: ICD-10: M32.1, R53.81  Plaquenil 200 mg oral tablet: 1 tab(s) orally every 12 hours  spironolactone 25 mg oral tablet: 1 tab(s) orally once a day  ticagrelor 90 mg oral tablet: 1 tab(s) orally every 12 hours Please take one tablet every 12 hours

## 2024-12-11 NOTE — DISCHARGE NOTE PROVIDER - PROVIDER TOKENS
PROVIDER:[[id number]],SCHEDULEDAPPT:[12/11/2025],SCHEDULEDAPPTTIME:[01:30 PM]] PROVIDER:[TOKEN:[[id number]],SCHEDULEDAPPT:[01/09/2025],SCHEDULEDAPPTTIME:[01:30 PM]]

## 2024-12-11 NOTE — SBIRT NOTE ADULT - NSSBIRTDRGSTOPUSE_GEN_A_CORE
Principal Discharge DX:	Labor and delivery, indication for care  Goal:	routine  Instructions for follow-up, activity and diet:	routine
No

## 2024-12-12 LAB
C3 SERPL-MCNC: 169 MG/DL — HIGH (ref 81–157)
C4 SERPL-MCNC: 18 MG/DL — SIGNIFICANT CHANGE UP (ref 13–39)

## 2024-12-15 LAB
AUTO DIFF PNL BLD: ABNORMAL
C-ANCA SER-ACNC: NEGATIVE — SIGNIFICANT CHANGE UP
P-ANCA SER-ACNC: NEGATIVE — SIGNIFICANT CHANGE UP

## 2024-12-17 DIAGNOSIS — F12.99 CANNABIS USE, UNSPECIFIED WITH UNSPECIFIED CANNABIS-INDUCED DISORDER: ICD-10-CM

## 2024-12-17 DIAGNOSIS — M48.00 SPINAL STENOSIS, SITE UNSPECIFIED: ICD-10-CM

## 2024-12-17 DIAGNOSIS — R06.00 DYSPNEA, UNSPECIFIED: ICD-10-CM

## 2024-12-17 DIAGNOSIS — M32.9 SYSTEMIC LUPUS ERYTHEMATOSUS, UNSPECIFIED: ICD-10-CM

## 2024-12-17 DIAGNOSIS — Z79.84 LONG TERM (CURRENT) USE OF ORAL HYPOGLYCEMIC DRUGS: ICD-10-CM

## 2024-12-17 DIAGNOSIS — I77.6 ARTERITIS, UNSPECIFIED: ICD-10-CM

## 2024-12-17 DIAGNOSIS — E87.1 HYPO-OSMOLALITY AND HYPONATREMIA: ICD-10-CM

## 2024-12-17 DIAGNOSIS — J18.9 PNEUMONIA, UNSPECIFIED ORGANISM: ICD-10-CM

## 2024-12-17 DIAGNOSIS — I11.0 HYPERTENSIVE HEART DISEASE WITH HEART FAILURE: ICD-10-CM

## 2024-12-17 DIAGNOSIS — I50.22 CHRONIC SYSTOLIC (CONGESTIVE) HEART FAILURE: ICD-10-CM

## 2024-12-17 DIAGNOSIS — K21.9 GASTRO-ESOPHAGEAL REFLUX DISEASE WITHOUT ESOPHAGITIS: ICD-10-CM

## 2024-12-17 DIAGNOSIS — J98.11 ATELECTASIS: ICD-10-CM

## 2024-12-17 DIAGNOSIS — D50.0 IRON DEFICIENCY ANEMIA SECONDARY TO BLOOD LOSS (CHRONIC): ICD-10-CM

## 2024-12-17 DIAGNOSIS — Z79.82 LONG TERM (CURRENT) USE OF ASPIRIN: ICD-10-CM

## 2024-12-17 DIAGNOSIS — E66.813 OBESITY, CLASS 3: ICD-10-CM

## 2024-12-17 DIAGNOSIS — N92.0 EXCESSIVE AND FREQUENT MENSTRUATION WITH REGULAR CYCLE: ICD-10-CM

## 2024-12-17 DIAGNOSIS — I42.8 OTHER CARDIOMYOPATHIES: ICD-10-CM

## 2024-12-17 DIAGNOSIS — N17.9 ACUTE KIDNEY FAILURE, UNSPECIFIED: ICD-10-CM

## 2024-12-17 DIAGNOSIS — Z95.5 PRESENCE OF CORONARY ANGIOPLASTY IMPLANT AND GRAFT: ICD-10-CM

## 2024-12-26 DIAGNOSIS — I25.10 ATHEROSCLEROTIC HEART DISEASE OF NATIVE CORONARY ARTERY W/OUT ANGINA PECTORIS: ICD-10-CM

## 2024-12-26 RX ORDER — ISOSORBIDE MONONITRATE 30 MG/1
30 TABLET, EXTENDED RELEASE ORAL DAILY
Qty: 90 | Refills: 3 | Status: ACTIVE | COMMUNITY
Start: 2024-12-26 | End: 1900-01-01

## 2024-12-28 ENCOUNTER — NON-APPOINTMENT (OUTPATIENT)
Age: 44
End: 2024-12-28

## 2024-12-30 ENCOUNTER — APPOINTMENT (OUTPATIENT)
Dept: PULMONOLOGY | Facility: CLINIC | Age: 44
End: 2024-12-30
Payer: MEDICAID

## 2024-12-30 VITALS
SYSTOLIC BLOOD PRESSURE: 102 MMHG | TEMPERATURE: 96.5 F | RESPIRATION RATE: 15 BRPM | HEART RATE: 75 BPM | BODY MASS INDEX: 46.36 KG/M2 | HEIGHT: 65 IN | DIASTOLIC BLOOD PRESSURE: 66 MMHG | OXYGEN SATURATION: 98 % | WEIGHT: 278.25 LBS

## 2024-12-30 DIAGNOSIS — Z82.0 FAMILY HISTORY OF EPILEPSY AND OTHER DISEASES OF THE NERVOUS SYSTEM: ICD-10-CM

## 2024-12-30 DIAGNOSIS — J18.9 PNEUMONIA, UNSPECIFIED ORGANISM: ICD-10-CM

## 2024-12-30 PROCEDURE — 99213 OFFICE O/P EST LOW 20 MIN: CPT

## 2025-01-09 ENCOUNTER — APPOINTMENT (OUTPATIENT)
Dept: PULMONOLOGY | Facility: CLINIC | Age: 45
End: 2025-01-09

## 2025-01-09 ENCOUNTER — APPOINTMENT (OUTPATIENT)
Dept: HEART AND VASCULAR | Facility: CLINIC | Age: 45
End: 2025-01-09
Payer: MEDICAID

## 2025-01-09 ENCOUNTER — NON-APPOINTMENT (OUTPATIENT)
Age: 45
End: 2025-01-09

## 2025-01-09 VITALS
SYSTOLIC BLOOD PRESSURE: 90 MMHG | OXYGEN SATURATION: 99 % | DIASTOLIC BLOOD PRESSURE: 61 MMHG | HEART RATE: 57 BPM | TEMPERATURE: 97.8 F | WEIGHT: 270 LBS | HEIGHT: 65 IN | BODY MASS INDEX: 44.98 KG/M2

## 2025-01-09 DIAGNOSIS — Z95.5 PRESENCE OF CORONARY ANGIOPLASTY IMPLANT AND GRAFT: ICD-10-CM

## 2025-01-09 DIAGNOSIS — I25.10 ATHEROSCLEROTIC HEART DISEASE OF NATIVE CORONARY ARTERY W/OUT ANGINA PECTORIS: ICD-10-CM

## 2025-01-09 DIAGNOSIS — R29.818 OTHER SYMPTOMS AND SIGNS INVOLVING THE NERVOUS SYSTEM: ICD-10-CM

## 2025-01-09 PROCEDURE — G2211 COMPLEX E/M VISIT ADD ON: CPT | Mod: NC

## 2025-01-09 PROCEDURE — 93000 ELECTROCARDIOGRAM COMPLETE: CPT

## 2025-01-09 PROCEDURE — 99214 OFFICE O/P EST MOD 30 MIN: CPT

## 2025-01-11 ENCOUNTER — TRANSCRIPTION ENCOUNTER (OUTPATIENT)
Age: 45
End: 2025-01-11

## 2025-01-15 DIAGNOSIS — I50.21 ACUTE SYSTOLIC (CONGESTIVE) HEART FAILURE: ICD-10-CM

## 2025-01-15 DIAGNOSIS — I10 ESSENTIAL (PRIMARY) HYPERTENSION: ICD-10-CM

## 2025-01-15 DIAGNOSIS — I21.3 ST ELEVATION (STEMI) MYOCARDIAL INFARCTION OF UNSPECIFIED SITE: ICD-10-CM

## 2025-01-15 DIAGNOSIS — E78.5 HYPERLIPIDEMIA, UNSPECIFIED: ICD-10-CM

## 2025-02-19 ENCOUNTER — TRANSCRIPTION ENCOUNTER (OUTPATIENT)
Age: 45
End: 2025-02-19

## 2025-04-10 ENCOUNTER — APPOINTMENT (OUTPATIENT)
Dept: HEART AND VASCULAR | Facility: CLINIC | Age: 45
End: 2025-04-10

## 2025-04-10 ENCOUNTER — NON-APPOINTMENT (OUTPATIENT)
Age: 45
End: 2025-04-10

## 2025-04-10 VITALS
DIASTOLIC BLOOD PRESSURE: 56 MMHG | TEMPERATURE: 98.7 F | OXYGEN SATURATION: 98 % | BODY MASS INDEX: 45.15 KG/M2 | HEART RATE: 55 BPM | SYSTOLIC BLOOD PRESSURE: 108 MMHG | WEIGHT: 270.99 LBS | HEIGHT: 65 IN

## 2025-04-10 DIAGNOSIS — I50.21 ACUTE SYSTOLIC (CONGESTIVE) HEART FAILURE: ICD-10-CM

## 2025-04-10 DIAGNOSIS — E78.5 HYPERLIPIDEMIA, UNSPECIFIED: ICD-10-CM

## 2025-04-10 DIAGNOSIS — I25.10 ATHEROSCLEROTIC HEART DISEASE OF NATIVE CORONARY ARTERY W/OUT ANGINA PECTORIS: ICD-10-CM

## 2025-04-10 DIAGNOSIS — I10 ESSENTIAL (PRIMARY) HYPERTENSION: ICD-10-CM

## 2025-04-10 PROCEDURE — 99214 OFFICE O/P EST MOD 30 MIN: CPT | Mod: 25

## 2025-04-10 PROCEDURE — 93000 ELECTROCARDIOGRAM COMPLETE: CPT

## 2025-05-14 ENCOUNTER — TRANSCRIPTION ENCOUNTER (OUTPATIENT)
Age: 45
End: 2025-05-14

## 2025-06-02 ENCOUNTER — EMERGENCY (EMERGENCY)
Facility: HOSPITAL | Age: 45
LOS: 1 days | End: 2025-06-02
Attending: STUDENT IN AN ORGANIZED HEALTH CARE EDUCATION/TRAINING PROGRAM | Admitting: STUDENT IN AN ORGANIZED HEALTH CARE EDUCATION/TRAINING PROGRAM
Payer: COMMERCIAL

## 2025-06-02 VITALS
DIASTOLIC BLOOD PRESSURE: 73 MMHG | SYSTOLIC BLOOD PRESSURE: 119 MMHG | HEART RATE: 70 BPM | TEMPERATURE: 99 F | WEIGHT: 268.08 LBS | RESPIRATION RATE: 18 BRPM | OXYGEN SATURATION: 98 %

## 2025-06-02 DIAGNOSIS — Z98.890 OTHER SPECIFIED POSTPROCEDURAL STATES: Chronic | ICD-10-CM

## 2025-06-02 PROCEDURE — 99284 EMERGENCY DEPT VISIT MOD MDM: CPT

## 2025-06-02 PROCEDURE — 99283 EMERGENCY DEPT VISIT LOW MDM: CPT | Mod: 25

## 2025-06-02 PROCEDURE — 73030 X-RAY EXAM OF SHOULDER: CPT

## 2025-06-02 PROCEDURE — 73030 X-RAY EXAM OF SHOULDER: CPT | Mod: 26,LT

## 2025-06-02 PROCEDURE — 96372 THER/PROPH/DIAG INJ SC/IM: CPT

## 2025-06-02 RX ORDER — DICLOFENAC SODIUM 10 MG/G
2.25 GEL TOPICAL
Qty: 1 | Refills: 0
Start: 2025-06-02 | End: 2025-06-16

## 2025-06-02 RX ORDER — KETOROLAC TROMETHAMINE 30 MG/ML
30 INJECTION, SOLUTION INTRAMUSCULAR; INTRAVENOUS ONCE
Refills: 0 | Status: DISCONTINUED | OUTPATIENT
Start: 2025-06-02 | End: 2025-06-02

## 2025-06-02 RX ORDER — LIDOCAINE HYDROCHLORIDE 20 MG/ML
1 JELLY TOPICAL ONCE
Refills: 0 | Status: COMPLETED | OUTPATIENT
Start: 2025-06-02 | End: 2025-06-02

## 2025-06-02 RX ORDER — LIDOCAINE HYDROCHLORIDE 20 MG/ML
1 JELLY TOPICAL
Qty: 4 | Refills: 0
Start: 2025-06-02 | End: 2025-06-11

## 2025-06-02 RX ADMIN — KETOROLAC TROMETHAMINE 30 MILLIGRAM(S): 30 INJECTION, SOLUTION INTRAMUSCULAR; INTRAVENOUS at 21:12

## 2025-06-02 NOTE — ED PROVIDER NOTE - PATIENT PORTAL LINK FT
You can access the FollowMyHealth Patient Portal offered by [facility] by registering at the following website: [email or web address]. By joining [email or web address]’s FollowMyHealth portal, you will also be able to view your health information using other applications (apps) compatible with our system.

## 2025-06-02 NOTE — ED ADULT NURSE NOTE - OBJECTIVE STATEMENT
PT reports injury to L shoulder ~1 week ago, denies angina, dyspnea, swelling or other sx, PNV intact

## 2025-06-02 NOTE — ED PROVIDER NOTE - MUSCULOSKELETAL MINIMAL EXAM
No significant left shoulder capsule tenderness pain with all range of motion.  Active or passive distal pulses intact  strength normal elbow flexion normal sensation intact in all dermatome

## 2025-06-02 NOTE — ED PROVIDER NOTE - CLINICAL SUMMARY MEDICAL DECISION MAKING FREE TEXT BOX
Patient present Emergency Department with severe shoulder pain pain with range of motion both active and passive no overlying skin changes no fever intact distal neurosensory exam.  Possible ligamentous injury or tendinitis or adhesive capsulitis.  Will treat symptomatically and reassess.  Send x-ray.  For Ortho follow-up.

## 2025-06-02 NOTE — ED PROVIDER NOTE - OBJECTIVE STATEMENT
Patient is a 45-year-old female PMHx of HFrEF (EF 45-50% 07/2024) 2/2 NICM (s/p PCI w/ LI x1 to LAD in 02/2024), SLE (on Plaquenil), chronic DAVID 2/2 heavy menses, spinal stenosis, and GERDPresent emergency room with left shoulder pain.  Patient states for the past week left-sided shoulder pain with all movement.  States the symptoms may have began after working out with 5 to 10 pound weights.  Denies any direct trauma.  Denies any fevers.  Denies any weakness numbness or tingling.  States the pain begins in the shoulder radiates down towards the elbow and up towards the neck region.  Denies any other joint pains or issues.

## 2025-06-03 ENCOUNTER — APPOINTMENT (OUTPATIENT)
Dept: AFTER HOURS CARE | Facility: EMERGENCY ROOM | Age: 45
End: 2025-06-03
Payer: MEDICAID

## 2025-06-03 DIAGNOSIS — M77.8 OTHER ENTHESOPATHIES, NOT ELSEWHERE CLASSIFIED: ICD-10-CM

## 2025-06-03 PROCEDURE — 99215 OFFICE O/P EST HI 40 MIN: CPT | Mod: 95

## 2025-06-03 RX ORDER — DICLOFENAC SODIUM 10 MG/G
1 GEL TOPICAL DAILY
Qty: 1 | Refills: 0 | Status: ACTIVE | COMMUNITY
Start: 2025-06-03 | End: 1900-01-01

## 2025-06-03 RX ORDER — TIZANIDINE 4 MG/1
4 TABLET ORAL EVERY 6 HOURS
Qty: 40 | Refills: 0 | Status: ACTIVE | COMMUNITY
Start: 2025-06-03 | End: 1900-01-01

## 2025-06-03 RX ORDER — LIDOCAINE 5% 700 MG/1
5 PATCH TOPICAL
Qty: 10 | Refills: 0 | Status: ACTIVE | COMMUNITY
Start: 2025-06-03 | End: 1900-01-01

## 2025-06-05 DIAGNOSIS — I50.20 UNSPECIFIED SYSTOLIC (CONGESTIVE) HEART FAILURE: ICD-10-CM

## 2025-06-05 DIAGNOSIS — Z95.5 PRESENCE OF CORONARY ANGIOPLASTY IMPLANT AND GRAFT: ICD-10-CM

## 2025-06-05 DIAGNOSIS — M25.512 PAIN IN LEFT SHOULDER: ICD-10-CM

## 2025-06-05 DIAGNOSIS — M32.9 SYSTEMIC LUPUS ERYTHEMATOSUS, UNSPECIFIED: ICD-10-CM

## 2025-06-05 DIAGNOSIS — D50.9 IRON DEFICIENCY ANEMIA, UNSPECIFIED: ICD-10-CM

## 2025-06-05 DIAGNOSIS — K21.9 GASTRO-ESOPHAGEAL REFLUX DISEASE WITHOUT ESOPHAGITIS: ICD-10-CM

## 2025-06-05 DIAGNOSIS — M48.00 SPINAL STENOSIS, SITE UNSPECIFIED: ICD-10-CM

## 2025-07-01 ENCOUNTER — TRANSCRIPTION ENCOUNTER (OUTPATIENT)
Age: 45
End: 2025-07-01